# Patient Record
Sex: MALE | Race: WHITE | Employment: OTHER | URBAN - METROPOLITAN AREA
[De-identification: names, ages, dates, MRNs, and addresses within clinical notes are randomized per-mention and may not be internally consistent; named-entity substitution may affect disease eponyms.]

---

## 2019-03-05 RX ORDER — INSULIN DEGLUDEC INJECTION 100 U/ML
24 INJECTION, SOLUTION SUBCUTANEOUS DAILY
COMMUNITY
End: 2021-10-13

## 2019-03-05 RX ORDER — HYDROCODONE BITARTRATE AND ACETAMINOPHEN 10; 325 MG/1; MG/1
1 TABLET ORAL
Status: ON HOLD | COMMUNITY
End: 2019-03-27 | Stop reason: SDUPTHER

## 2019-03-05 RX ORDER — GLIMEPIRIDE 4 MG/1
4 TABLET ORAL DAILY
COMMUNITY

## 2019-03-05 RX ORDER — SOTALOL HYDROCHLORIDE 80 MG/1
40 TABLET ORAL 2 TIMES DAILY
COMMUNITY

## 2019-03-05 RX ORDER — PANTOPRAZOLE SODIUM 40 MG/1
40 TABLET, DELAYED RELEASE ORAL DAILY
COMMUNITY
End: 2021-06-02

## 2019-03-05 RX ORDER — RANITIDINE 150 MG/1
150 TABLET, FILM COATED ORAL 2 TIMES DAILY
COMMUNITY
End: 2019-03-19 | Stop reason: CLARIF

## 2019-03-05 RX ORDER — CLOPIDOGREL BISULFATE 75 MG/1
TABLET ORAL
Status: ON HOLD | COMMUNITY
End: 2019-05-15

## 2019-03-05 RX ORDER — CHLORTHALIDONE 25 MG/1
TABLET ORAL DAILY
COMMUNITY
End: 2019-03-19 | Stop reason: CLARIF

## 2019-03-05 RX ORDER — TRAZODONE HYDROCHLORIDE 50 MG/1
75 TABLET ORAL
COMMUNITY

## 2019-03-05 RX ORDER — LOSARTAN POTASSIUM 100 MG/1
100 TABLET ORAL DAILY
COMMUNITY
End: 2021-10-13

## 2019-03-08 ENCOUNTER — HOSPITAL ENCOUNTER (OUTPATIENT)
Dept: CT IMAGING | Age: 74
Discharge: HOME | End: 2019-03-08
Attending: PHYSICIAN ASSISTANT
Payer: MEDICARE

## 2019-03-08 ENCOUNTER — HOSPITAL ENCOUNTER (OUTPATIENT)
Dept: INTERVENTIONAL RADIOLOGY/VASCULAR | Age: 74
Discharge: HOME | End: 2019-03-08
Attending: PHYSICIAN ASSISTANT
Payer: MEDICARE

## 2019-03-08 DIAGNOSIS — M54.17 RADICULOPATHY OF LUMBOSACRAL REGION: ICD-10-CM

## 2019-03-08 PROCEDURE — 77030014143 HC TY PUNC LUMBR BD -A

## 2019-03-08 RX ORDER — LIDOCAINE HYDROCHLORIDE 20 MG/ML
20-300 INJECTION, SOLUTION EPIDURAL; INFILTRATION; INTRACAUDAL; PERINEURAL ONCE
Status: DISCONTINUED | OUTPATIENT
Start: 2019-03-08 | End: 2019-03-08

## 2019-03-14 ENCOUNTER — HOSPITAL ENCOUNTER (OUTPATIENT)
Dept: CT IMAGING | Age: 74
Discharge: HOME | End: 2019-03-14
Attending: PHYSICIAN ASSISTANT
Payer: MEDICARE

## 2019-03-14 ENCOUNTER — HOSPITAL ENCOUNTER (OUTPATIENT)
Dept: INTERVENTIONAL RADIOLOGY/VASCULAR | Age: 74
Discharge: HOME | End: 2019-03-14
Attending: PHYSICIAN ASSISTANT
Payer: MEDICARE

## 2019-03-14 VITALS
HEART RATE: 70 BPM | WEIGHT: 250 LBS | BODY MASS INDEX: 31.08 KG/M2 | OXYGEN SATURATION: 98 % | SYSTOLIC BLOOD PRESSURE: 175 MMHG | HEIGHT: 75 IN | DIASTOLIC BLOOD PRESSURE: 81 MMHG

## 2019-03-14 PROCEDURE — 77030003666 HC NDL SPINAL BD -A

## 2019-03-14 PROCEDURE — 74011636320 HC RX REV CODE- 636/320: Performed by: PHYSICIAN ASSISTANT

## 2019-03-14 PROCEDURE — 72132 CT LUMBAR SPINE W/DYE: CPT

## 2019-03-14 PROCEDURE — 62304 MYELOGRAPHY LUMBAR INJECTION: CPT

## 2019-03-14 PROCEDURE — 74011250636 HC RX REV CODE- 250/636: Performed by: PHYSICIAN ASSISTANT

## 2019-03-14 PROCEDURE — 77030014143 HC TY PUNC LUMBR BD -A

## 2019-03-14 RX ORDER — LIDOCAINE HYDROCHLORIDE 20 MG/ML
20-400 INJECTION, SOLUTION INFILTRATION; PERINEURAL ONCE
Status: COMPLETED | OUTPATIENT
Start: 2019-03-14 | End: 2019-03-14

## 2019-03-14 RX ADMIN — IOPAMIDOL 16 ML: 408 INJECTION, SOLUTION INTRATHECAL at 14:15

## 2019-03-14 RX ADMIN — LIDOCAINE HYDROCHLORIDE 80 MG: 20 INJECTION, SOLUTION INFILTRATION; PERINEURAL at 14:11

## 2019-03-14 NOTE — PROGRESS NOTES
TRANSFER - OUT REPORT:    Verbal report given to HERNESTO Fang(name) on Norman Morel  being transferred to IR(unit) for routine progression of care       Report consisted of patients Situation, Background, Assessment and   Recommendations(SBAR). Information from the following report(s) SBAR and MAR was reviewed with the receiving nurse. Lines:       Opportunity for questions and clarification was provided.       Patient transported with:   Registered Nurse

## 2019-03-14 NOTE — DISCHARGE INSTRUCTIONS
[address]  Department of Interventional Radiology  [facility]  [phone] Office  [phone] Fax  POST LUMBAR PUNCTURE/MYELOGRAM DISCHARGE INSTRUCTIONS  General Information:  Lumbar Puncture: A LP is done to help diagnose several disorders, like pseudo tumor, migraines, meningitis, and multiple sclerosis. It involves a puncture (usually in the lower spine) into the sac that protects the spinal column. A sample of the fluid in that space is removed and tested in the lab. Myelogram:   A Myelogram involves a lumbar puncture, and instead of removing fluid, contrast will be injected into the sac surrounding the spinal column. It is done to visualize the spinal column, nerve roots, spinal canal, vertebral discs and disc space. It is usually done to diagnose back pain with unknown cause or in preparation for surgery. After the injection, a CT scan will be done, usually within two hours of the injection. Call If:   You should call your Physician and/or the Radiology Nurse if you develop a headache that is not relieved by Tylenol, and worsens when you stand and eases when you lie down, you need to call. You may have developed what is referred to as a spinal headache. Our physicians will probably advise you to be on strict bed rest for 24 hours, to drink lots of fluids and caffeine. If this does not help the head pain, call again the next day. You should call if you have bleeding other than a small spot on your bandage. You should call if you have any numbness, tingling, weakness, fever, chills, urinary retention, severe itching, rash, welts, swelling, or confusion. Follow-up Instructions: See the doctor who ordered your procedure as he/she has instructed. If you had a Lumbar Puncture or Myelogram, your results should be available to your ordering doctor in 3-5 business days.  You can remove your dressing in 24 hours and shower regularly. Do not bathe or swim for 72 hours. To Reach Us: If you have any questions about your procedure, please call the Interventional Radiology department at [phone]. After business hours (5pm) and weekends, call the answering service at [phone] and ask for the Radiologist on call to be paged. Interventional Radiology General Nurse Discharge    After general anesthesia or intravenous sedation, for 24 hours or while taking prescription Narcotics:  · Limit your activities  · Do not drive and operate hazardous machinery  · Do not make important personal or business decisions  · Do  not drink alcoholic beverages  · If you have not urinated within 8 hours after discharge, please contact your surgeon on call. * Please give a list of your current medications to your Primary Care Provider. * Please update this list whenever your medications are discontinued, doses are     changed, or new medications (including over-the-counter products) are added. * Please carry medication information at all times in case of emergency situations. These are general instructions for a healthy lifestyle:    No smoking/ No tobacco products/ Avoid exposure to second hand smoke  Surgeon General's Warning:  Quitting smoking now greatly reduces serious risk to your health. Obesity, smoking, and sedentary lifestyle greatly increases your risk for illness  A healthy diet, regular physical exercise & weight monitoring are important for maintaining a healthy lifestyle    You may be retaining fluid if you have a history of heart failure or if you experience any of the following symptoms:  Weight gain of 3 pounds or more overnight or 5 pounds in a week, increased swelling in our hands or feet or shortness of breath while lying flat in bed. Please call your doctor as soon as you notice any of these symptoms; do not wait until your next office visit.     Recognize signs and symptoms of STROKE:  F-face looks uneven    A-arms unable to move or move unevenly    S-speech slurred or non-existent    T-time-call 911 as soon as signs and symptoms begin-DO NOT go       Back to bed or wait to see if you get better-TIME IS BRAIN.       Patient Signature:  Date: 3/14/2019  Discharging Nurse: Malu Bear RN

## 2019-03-14 NOTE — PROGRESS NOTES
Discharged per provider order. Verbalized understanding of discharge instructions. Transferred out via wheelchair with wife to drive home.

## 2019-03-14 NOTE — PROGRESS NOTES
Patient in procedure room at [phone], patient alert and orient, patient assisted to procedure table with minimal help. Patient will not have sedation for this case.

## 2019-03-19 ENCOUNTER — HOSPITAL ENCOUNTER (OUTPATIENT)
Dept: SURGERY | Age: 74
Discharge: HOME | End: 2019-03-19
Payer: MEDICARE

## 2019-03-19 VITALS
BODY MASS INDEX: 31.69 KG/M2 | RESPIRATION RATE: 18 BRPM | DIASTOLIC BLOOD PRESSURE: 76 MMHG | SYSTOLIC BLOOD PRESSURE: 168 MMHG | HEIGHT: 76 IN | WEIGHT: 260.25 LBS | OXYGEN SATURATION: 98 % | HEART RATE: 69 BPM | TEMPERATURE: 98.1 F

## 2019-03-19 LAB
BACTERIA SPEC CULT: ABNORMAL
EST. AVERAGE GLUCOSE BLD GHB EST-MCNC: 157 MG/DL
GLUCOSE BLD STRIP.AUTO-MCNC: 157 MG/DL (ref 65–100)
HBA1C MFR BLD: 7.1 % (ref 4.8–6)
POTASSIUM SERPL-SCNC: 4.4 MMOL/L (ref 3.5–5.1)
SERVICE CMNT-IMP: ABNORMAL

## 2019-03-19 PROCEDURE — 83036 HEMOGLOBIN GLYCOSYLATED A1C: CPT

## 2019-03-19 PROCEDURE — 87641 MR-STAPH DNA AMP PROBE: CPT

## 2019-03-19 PROCEDURE — 84132 ASSAY OF SERUM POTASSIUM: CPT

## 2019-03-19 PROCEDURE — 77030027138 HC INCENT SPIROMETER -A

## 2019-03-19 PROCEDURE — 82962 GLUCOSE BLOOD TEST: CPT

## 2019-03-19 NOTE — PERIOP NOTES
Lab results reviewed and acceptable per anesthesia protocol. Still awaiting MRSA results. Recent Results (from the past 12 hour(s)) POTASSIUM Collection Time: 03/19/19  2:52 PM  
Result Value Ref Range Potassium 4.4 3.5 - 5.1 mmol/L  
HEMOGLOBIN A1C WITH EAG Collection Time: 03/19/19  2:52 PM  
Result Value Ref Range Hemoglobin A1c 7.1 (H) 4.8 - 6.0 % Est. average glucose 157 mg/dL GLUCOSE, POC Collection Time: 03/19/19  2:59 PM  
Result Value Ref Range Glucose (POC) 157 (H) 65 - 100 mg/dL

## 2019-03-19 NOTE — PERIOP NOTES
Called Dr. Dedra Cabrera office for most recent pacemaker interrogation record, most recent echo, and cardiac clearance for surgery and to hold Xarelto for 3 days and Plavix for 7 days. Spoke with Shamika Oquendo in medical records, will fax documents. [facility] at Dr. Lucio Perez office notified of needing clearance to hold blood thinners.

## 2019-03-19 NOTE — PERIOP NOTES
Patient verified name & . Order to obtain consent NOT found in EHR  And unable to match case posting. TYPE  CASE:lb Orders: NOT received at time of PAT Labs per Spine protocol:  MRSA, HA1C Labs per anesthesia protocol: Potassium, Poc Glucose EKG/cardiac records  :  2018 A-Paced, Has [employer] pacemaker that was placed 2016, Requested most recent interrogation, ECHO and cardiac clearance from Dr. Reyes Baxter. Patient has 2 documented stents in careeverywhere but patient states he has total of 10 stents. Last heart cath on 2018 which he received 1 stent to LAD. Glucose: 157 Medication bottles or medication list were not visualized today. Instructed patient to continue previous medications as prescribed prior to surgery and  to take the following medications the day of surgery according to anesthesia guidelines with a small sip of water : Sotalol, Protonix, Hydrocodone if needed, Tresiba 19 units on DOS Continue all previous medications unless otherwise directed. Instructed patient to hold all vitamins 7 days prior to surgery and the following medications prior to surgery: Plavix starting 3/20/2019 and Xarelto starting 3/24/2019. Patient was instructed to start taking ASA 81 mg on the day Xarelto is stopped. Pt viewed Spine Pre-hab video. All further questions were addressed. Pt was provided with antibacterial soap, Hibiclens, long-handled sponge, Spine Recovery booklet and incentive spirometer. Pt correctly demonstrated use of incentive spirometer and instruction to bring it to the hospital on day of surgery. Handouts and all Surgery instructions provided to pt and pt verbalizes understanding. Patient Guide to Surgery Packet provided to patient.  Packet includes Patient Guide to surgery handout, Facts about Pain Management handout, Facts about Urinary Catherization handout, Hand Hygiene handout, Patient Education and Teaching Sheet -Transfusion of Blood and Blood Products handout, and  [facility] frequent question and answer sheet. Guide reviewed with the patient and all questions answered to the satisfaction of the patient. Pt advised to visit www. [employer] for more educational information regarding anesthesia and to record any additional questions that arise so that it can be addressed by the anesthesiologist on the morning of surgery. Patient instructed on the following and verbalized understanding: 
Arrive at main entrance, time of arrival to be called the day before by 1700. Responsible adult must drive patient to and from hospital, stay during surgery and 24 hours postoperatively. Npo after midnight including gum, mints and ice chips. Shower using hibiclens the night before and the morning of surgery. Hibiclens provided to the patient with handout and verbal instructions for use. Leave all valuables at home. Instructed on no jewelry or body piercings on the dos. Bring insurance card and ID. No perfumes, oil, powder, colognes, makeup or  lotions on the skin. Patient verbalized understanding of all instructions and provided all medical/health information to the best of their ability.

## 2019-03-19 NOTE — PERIOP NOTES
Dr. Landry Landry requesting pacer rep on DOS. Called [facility] and spoke with [place]. Will have the rep Matthew Coppola return call to confirm. Matthew Coppola returned called and confirmed that he would be here on day of surgery.

## 2019-03-20 RX ORDER — CEFAZOLIN SODIUM/WATER 2 G/20 ML
2 SYRINGE (ML) INTRAVENOUS ONCE
Status: CANCELLED | OUTPATIENT
Start: 2019-03-20 | End: 2019-03-20

## 2019-03-21 NOTE — PERIOP NOTES
Spoke with Zhang Arango at Dr. Viji Gerene office and requested records and medication hold note. (see nurse note 3/19/19 at [phone]).

## 2019-03-21 NOTE — PERIOP NOTES
Received faxed records from [facility] and placed on chart. Records include last 3 office visit notes with most recent of 3/19/19, Medication hold note for Xarelto and Plavix and cardiac clearance with acceptable risk dated 3/19/19, last pacemaker interrogation 12/20/18, heart cath note 5/19/18, ekg 2/22/19 and 6/27/18, echo 5/29/18, abdominal aorta report 5/29/18, lower extremity arterial report 5/29/18, carotid doppler report 5/29/18, stress test 5/29/18.

## 2019-03-22 NOTE — H&P
Chief complaint: Back and leg pain. History of present illness: The patient returns today with persistent symptoms of low back pain with radiation to bilateral groin and medial thigh. It greatly limits his ability to stand or walk. His symptoms are not completely alleviated by laying supine. His best position is sitting and leaning forward. He did try a couple of injections in his symptoms were alleviated for about 2 weeks. However, his sugars elevated significantly and he was unable to tolerate repeated injections. He has also tried a regimen of gabapentin and pain medication. He has tried a home exercise program but is unable to tolerate any time at all on his feet. His pain is rated 8/10 on the visual analog scale. He is here follow-up on his CT myelogram and discuss surgical options. PMHx/PSHx/Social History/Medications/Allergies/ROS are documented separately and have been reviewed. ROS: Denies fever, chills, chest pain. ????? Medications: Chlorthalidone (25 MG); Clopidogrel Bisulfate (75 MG);Glimepiride (4 MG); Hydrocodone-Acetaminophen ( MG); Losartan Potassium (100 MG);Pantoprazole Sodium (40 MG); Sotalol HCl (80 MG);TraZODone HCl (150 MG); Vinetta Lilliana FlexTouch (200 UNI. .. UNIT/ML); Xarelto (20 MG)  ????? Allergies: Augmentin;Sulfa; Tyloxapol  ?????    Physical Exam: This is a well developed well nourished adult male in no acute distress. Mood and affect are appropriate. Oriented to person, place, and time. Head is normocephalic and atraumatic. Extraocular movements intact. Sclera anicteric. Respirations are unlabored and there is no evidence of cyanosis. Chest is clear to auscultation. Heart is regular rate and rhythm. Abdomen is soft. Straight leg testing is negative. There is subjective light touch sensory loss over the bilateral anterior and medial thighs.     Reflexes   Right Left   Quadriceps (L4) 2 2   Achilles (S1) 2 2     Ankle jerk is negative for clonus    Strength testing in the lower extremity reveals the following based on the 5 point grading scale:     HF (L2) H Ab (L5) KE (L3/4) ADF (L4) EHL (L5) A Ev (S1) APF (S1)   Right 3 5 4 5 5 5 5   Left 3 5 4 5 5 5 5     The feet are warm with good capillary refill and palpable pedal pulses. Radiographic Studies:    CT scan lumbar spine report and images reviewed: He has severe stenosis at L2-L3 with only about 2 mm of canal remaining. He has postoperative changes at L5-S1. Assessment/Plan: This patients clinical history and physical exam is consistent with neurogenic claudication. The imaging studies are concordant with the patients symptoms. Conservative efforts have been reasonably exhausted and the patient feels like he cannot go on with the symptoms as they are. We have previously discussed surgical options and now he would like to proceed with surgical scheduling.  ????? We discussed the details of the surgery including a midline incision in over the low back followed by dissection to the area of stenosis. The nerves would be freed up by trimming any impinging structures including ligaments and bone. Once the nerves are freed the wound would be closed with suture and covered with sterile dressings. The patient would expect to stay in the hospital 1-2 days or until he can get about safely with minimal assistance. A short stay in a rehabilitation facility could also be considered depending on how quickly he recovers. Follow-up would be scheduled for 2-3 weeks and he would have restrictions including no driving, and no lifting greater than 15 lbs until follow up with me.   We also discussed the potential risks of the surgery including, but not limited to infection, spinal fluid leak and potential headaches requiring him to remain supine or have a lumbar drain inserted post-operatively; injury to the cauda equina or peripheral nerve root resulting in paralysis, bowel or bladder dysfunction, or loss of use of an extremity; persistent back or leg symptoms, recurrence of stenosis or the development of instability possibly needing additional surgery;  blood loss requiring transfusion; and the risks of anesthesia including, but not limited heart attack, stroke, and blood clot. The patient voiced an understanding of these issues and would like to discuss them over with his family and will get back with me with her desired treatment course. The surgery that I believe would be most beneficial here is a L2-L3 laminectomy. Dillan table with sling. He has already had cardiac clearance. He will come off of the Plavix and Xarelto at the appropriate times and remain off of them for 2 days postoperatively.           Electronically Signed By Rachel De Leon MD

## 2019-03-26 ENCOUNTER — ANESTHESIA EVENT (OUTPATIENT)
Dept: SURGERY | Age: 74
End: 2019-03-26
Payer: MEDICARE

## 2019-03-27 ENCOUNTER — ANESTHESIA (OUTPATIENT)
Dept: SURGERY | Age: 74
End: 2019-03-27
Payer: MEDICARE

## 2019-03-27 ENCOUNTER — HOSPITAL ENCOUNTER (OUTPATIENT)
Age: 74
Setting detail: OUTPATIENT SURGERY
Discharge: HOME | End: 2019-03-27
Attending: ORTHOPAEDIC SURGERY | Admitting: ORTHOPAEDIC SURGERY
Payer: MEDICARE

## 2019-03-27 ENCOUNTER — APPOINTMENT (OUTPATIENT)
Dept: GENERAL RADIOLOGY | Age: 74
End: 2019-03-27
Attending: ORTHOPAEDIC SURGERY
Payer: MEDICARE

## 2019-03-27 VITALS
DIASTOLIC BLOOD PRESSURE: 67 MMHG | BODY MASS INDEX: 30.88 KG/M2 | OXYGEN SATURATION: 95 % | HEIGHT: 76 IN | SYSTOLIC BLOOD PRESSURE: 149 MMHG | RESPIRATION RATE: 17 BRPM | HEART RATE: 70 BPM | TEMPERATURE: 97.7 F | WEIGHT: 253.56 LBS

## 2019-03-27 DIAGNOSIS — M48.062 LUMBAR STENOSIS WITH NEUROGENIC CLAUDICATION: Primary | ICD-10-CM

## 2019-03-27 LAB — GLUCOSE BLD STRIP.AUTO-MCNC: 123 MG/DL (ref 65–100)

## 2019-03-27 PROCEDURE — 76210000020 HC REC RM PH II FIRST 0.5 HR: Performed by: ORTHOPAEDIC SURGERY

## 2019-03-27 PROCEDURE — 76210000006 HC OR PH I REC 0.5 TO 1 HR: Performed by: ORTHOPAEDIC SURGERY

## 2019-03-27 PROCEDURE — 77030037088 HC TUBE ENDOTRACH ORAL NSL COVD-A: Performed by: NURSE ANESTHETIST, CERTIFIED REGISTERED

## 2019-03-27 PROCEDURE — 74011250636 HC RX REV CODE- 250/636

## 2019-03-27 PROCEDURE — 77030019940 HC BLNKT HYPOTHRM STRY -B: Performed by: NURSE ANESTHETIST, CERTIFIED REGISTERED

## 2019-03-27 PROCEDURE — 76010000161 HC OR TIME 1 TO 1.5 HR INTENSV-TIER 1: Performed by: ORTHOPAEDIC SURGERY

## 2019-03-27 PROCEDURE — 74011250637 HC RX REV CODE- 250/637: Performed by: ORTHOPAEDIC SURGERY

## 2019-03-27 PROCEDURE — 74011000250 HC RX REV CODE- 250: Performed by: ORTHOPAEDIC SURGERY

## 2019-03-27 PROCEDURE — 74011250637 HC RX REV CODE- 250/637: Performed by: ANESTHESIOLOGY

## 2019-03-27 PROCEDURE — 77030030163 HC BN WAX J&J -A: Performed by: ORTHOPAEDIC SURGERY

## 2019-03-27 PROCEDURE — 74011250636 HC RX REV CODE- 250/636: Performed by: ORTHOPAEDIC SURGERY

## 2019-03-27 PROCEDURE — 77030018836 HC SOL IRR NACL ICUM -A: Performed by: ORTHOPAEDIC SURGERY

## 2019-03-27 PROCEDURE — 77030031139 HC SUT VCRL2 J&J -A: Performed by: ORTHOPAEDIC SURGERY

## 2019-03-27 PROCEDURE — 77030019908 HC STETH ESOPH SIMS -A: Performed by: NURSE ANESTHETIST, CERTIFIED REGISTERED

## 2019-03-27 PROCEDURE — 74011250636 HC RX REV CODE- 250/636: Performed by: ANESTHESIOLOGY

## 2019-03-27 PROCEDURE — 82962 GLUCOSE BLOOD TEST: CPT

## 2019-03-27 PROCEDURE — 76060000033 HC ANESTHESIA 1 TO 1.5 HR: Performed by: ORTHOPAEDIC SURGERY

## 2019-03-27 PROCEDURE — 77030014650 HC SEAL MTRX FLOSEL BAXT -C: Performed by: ORTHOPAEDIC SURGERY

## 2019-03-27 PROCEDURE — 77030032490 HC SLV COMPR SCD KNE COVD -B: Performed by: ORTHOPAEDIC SURGERY

## 2019-03-27 PROCEDURE — 72020 X-RAY EXAM OF SPINE 1 VIEW: CPT

## 2019-03-27 PROCEDURE — 74011000250 HC RX REV CODE- 250

## 2019-03-27 PROCEDURE — 77030039425 HC BLD LARYNG TRULITE DISP TELE -A: Performed by: NURSE ANESTHETIST, CERTIFIED REGISTERED

## 2019-03-27 PROCEDURE — 77030020782 HC GWN BAIR PAWS FLX 3M -B: Performed by: NURSE ANESTHETIST, CERTIFIED REGISTERED

## 2019-03-27 PROCEDURE — 77030025623 HC BUR RND PRECIS STRY -D: Performed by: ORTHOPAEDIC SURGERY

## 2019-03-27 RX ORDER — LIDOCAINE HYDROCHLORIDE 10 MG/ML
0.1 INJECTION INFILTRATION; PERINEURAL AS NEEDED
Status: DISCONTINUED | OUTPATIENT
Start: 2019-03-27 | End: 2019-03-27 | Stop reason: HOSPADM

## 2019-03-27 RX ORDER — NALOXONE HYDROCHLORIDE 0.4 MG/ML
0.1 INJECTION, SOLUTION INTRAMUSCULAR; INTRAVENOUS; SUBCUTANEOUS
Status: DISCONTINUED | OUTPATIENT
Start: 2019-03-27 | End: 2019-03-27 | Stop reason: HOSPADM

## 2019-03-27 RX ORDER — ACETAMINOPHEN 500 MG
1000 TABLET ORAL ONCE
Status: COMPLETED | OUTPATIENT
Start: 2019-03-27 | End: 2019-03-27

## 2019-03-27 RX ORDER — FENTANYL CITRATE 50 UG/ML
INJECTION, SOLUTION INTRAMUSCULAR; INTRAVENOUS AS NEEDED
Status: DISCONTINUED | OUTPATIENT
Start: 2019-03-27 | End: 2019-03-27 | Stop reason: HOSPADM

## 2019-03-27 RX ORDER — SODIUM CHLORIDE, SODIUM LACTATE, POTASSIUM CHLORIDE, CALCIUM CHLORIDE 600; 310; 30; 20 MG/100ML; MG/100ML; MG/100ML; MG/100ML
75 INJECTION, SOLUTION INTRAVENOUS CONTINUOUS
Status: DISCONTINUED | OUTPATIENT
Start: 2019-03-27 | End: 2019-03-27 | Stop reason: HOSPADM

## 2019-03-27 RX ORDER — SODIUM CHLORIDE, SODIUM LACTATE, POTASSIUM CHLORIDE, CALCIUM CHLORIDE 600; 310; 30; 20 MG/100ML; MG/100ML; MG/100ML; MG/100ML
INJECTION, SOLUTION INTRAVENOUS
Status: DISCONTINUED | OUTPATIENT
Start: 2019-03-27 | End: 2019-03-27 | Stop reason: HOSPADM

## 2019-03-27 RX ORDER — VANCOMYCIN HYDROCHLORIDE 1 G/20ML
INJECTION, POWDER, LYOPHILIZED, FOR SOLUTION INTRAVENOUS AS NEEDED
Status: DISCONTINUED | OUTPATIENT
Start: 2019-03-27 | End: 2019-03-27 | Stop reason: HOSPADM

## 2019-03-27 RX ORDER — BUPIVACAINE HYDROCHLORIDE AND EPINEPHRINE 5; 5 MG/ML; UG/ML
INJECTION, SOLUTION EPIDURAL; INTRACAUDAL; PERINEURAL AS NEEDED
Status: DISCONTINUED | OUTPATIENT
Start: 2019-03-27 | End: 2019-03-27 | Stop reason: HOSPADM

## 2019-03-27 RX ORDER — HYDROCODONE BITARTRATE AND ACETAMINOPHEN 10; 325 MG/1; MG/1
1 TABLET ORAL
Qty: 42 TAB | Refills: 0 | Status: SHIPPED | OUTPATIENT
Start: 2019-03-27 | End: 2019-04-03

## 2019-03-27 RX ORDER — DEXAMETHASONE SODIUM PHOSPHATE 4 MG/ML
INJECTION, SOLUTION INTRA-ARTICULAR; INTRALESIONAL; INTRAMUSCULAR; INTRAVENOUS; SOFT TISSUE AS NEEDED
Status: DISCONTINUED | OUTPATIENT
Start: 2019-03-27 | End: 2019-03-27 | Stop reason: HOSPADM

## 2019-03-27 RX ORDER — ROCURONIUM BROMIDE 10 MG/ML
INJECTION, SOLUTION INTRAVENOUS AS NEEDED
Status: DISCONTINUED | OUTPATIENT
Start: 2019-03-27 | End: 2019-03-27 | Stop reason: HOSPADM

## 2019-03-27 RX ORDER — LIDOCAINE HYDROCHLORIDE 20 MG/ML
INJECTION, SOLUTION EPIDURAL; INFILTRATION; INTRACAUDAL; PERINEURAL AS NEEDED
Status: DISCONTINUED | OUTPATIENT
Start: 2019-03-27 | End: 2019-03-27 | Stop reason: HOSPADM

## 2019-03-27 RX ORDER — NEOSTIGMINE METHYLSULFATE 1 MG/ML
INJECTION INTRAVENOUS AS NEEDED
Status: DISCONTINUED | OUTPATIENT
Start: 2019-03-27 | End: 2019-03-27 | Stop reason: HOSPADM

## 2019-03-27 RX ORDER — OXYCODONE HYDROCHLORIDE 5 MG/1
10 TABLET ORAL
Status: DISCONTINUED | OUTPATIENT
Start: 2019-03-27 | End: 2019-03-27 | Stop reason: HOSPADM

## 2019-03-27 RX ORDER — CEFAZOLIN SODIUM/WATER 2 G/20 ML
2 SYRINGE (ML) INTRAVENOUS ONCE
Status: COMPLETED | OUTPATIENT
Start: 2019-03-27 | End: 2019-03-27

## 2019-03-27 RX ORDER — GLYCOPYRROLATE 0.2 MG/ML
INJECTION INTRAMUSCULAR; INTRAVENOUS AS NEEDED
Status: DISCONTINUED | OUTPATIENT
Start: 2019-03-27 | End: 2019-03-27 | Stop reason: HOSPADM

## 2019-03-27 RX ORDER — HYDROMORPHONE HYDROCHLORIDE 2 MG/ML
0.5 INJECTION, SOLUTION INTRAMUSCULAR; INTRAVENOUS; SUBCUTANEOUS
Status: DISCONTINUED | OUTPATIENT
Start: 2019-03-27 | End: 2019-03-27 | Stop reason: HOSPADM

## 2019-03-27 RX ORDER — GABAPENTIN 300 MG/1
300 CAPSULE ORAL ONCE
Status: COMPLETED | OUTPATIENT
Start: 2019-03-27 | End: 2019-03-27

## 2019-03-27 RX ORDER — ASPIRIN 81 MG/1
81 TABLET ORAL DAILY
COMMUNITY
Start: 2019-03-24 | End: 2021-06-02

## 2019-03-27 RX ORDER — FLUMAZENIL 0.1 MG/ML
0.2 INJECTION INTRAVENOUS AS NEEDED
Status: DISCONTINUED | OUTPATIENT
Start: 2019-03-27 | End: 2019-03-27 | Stop reason: HOSPADM

## 2019-03-27 RX ORDER — PROPOFOL 10 MG/ML
INJECTION, EMULSION INTRAVENOUS AS NEEDED
Status: DISCONTINUED | OUTPATIENT
Start: 2019-03-27 | End: 2019-03-27 | Stop reason: HOSPADM

## 2019-03-27 RX ORDER — EPHEDRINE SULFATE 50 MG/ML
INJECTION, SOLUTION INTRAVENOUS AS NEEDED
Status: DISCONTINUED | OUTPATIENT
Start: 2019-03-27 | End: 2019-03-27 | Stop reason: HOSPADM

## 2019-03-27 RX ORDER — DIPHENHYDRAMINE HYDROCHLORIDE 50 MG/ML
12.5 INJECTION, SOLUTION INTRAMUSCULAR; INTRAVENOUS
Status: DISCONTINUED | OUTPATIENT
Start: 2019-03-27 | End: 2019-03-27 | Stop reason: HOSPADM

## 2019-03-27 RX ORDER — OXYCODONE HYDROCHLORIDE 5 MG/1
5 TABLET ORAL
Status: DISCONTINUED | OUTPATIENT
Start: 2019-03-27 | End: 2019-03-27 | Stop reason: HOSPADM

## 2019-03-27 RX ADMIN — NEOSTIGMINE METHYLSULFATE 1.5 MG: 1 INJECTION INTRAVENOUS at 10:00

## 2019-03-27 RX ADMIN — SODIUM CHLORIDE, SODIUM LACTATE, POTASSIUM CHLORIDE, AND CALCIUM CHLORIDE 75 ML/HR: 600; 310; 30; 20 INJECTION, SOLUTION INTRAVENOUS at 08:13

## 2019-03-27 RX ADMIN — DEXAMETHASONE SODIUM PHOSPHATE 4 MG: 4 INJECTION, SOLUTION INTRA-ARTICULAR; INTRALESIONAL; INTRAMUSCULAR; INTRAVENOUS; SOFT TISSUE at 09:15

## 2019-03-27 RX ADMIN — FENTANYL CITRATE 25 MCG: 50 INJECTION, SOLUTION INTRAMUSCULAR; INTRAVENOUS at 10:02

## 2019-03-27 RX ADMIN — ROCURONIUM BROMIDE 50 MG: 10 INJECTION, SOLUTION INTRAVENOUS at 09:07

## 2019-03-27 RX ADMIN — FENTANYL CITRATE 25 MCG: 50 INJECTION, SOLUTION INTRAMUSCULAR; INTRAVENOUS at 10:05

## 2019-03-27 RX ADMIN — FENTANYL CITRATE 25 MCG: 50 INJECTION, SOLUTION INTRAMUSCULAR; INTRAVENOUS at 10:00

## 2019-03-27 RX ADMIN — PROPOFOL 10 MG: 10 INJECTION, EMULSION INTRAVENOUS at 09:51

## 2019-03-27 RX ADMIN — Medication 1 G: at 09:15

## 2019-03-27 RX ADMIN — Medication 1 G: at 09:17

## 2019-03-27 RX ADMIN — SODIUM CHLORIDE, SODIUM LACTATE, POTASSIUM CHLORIDE, CALCIUM CHLORIDE: 600; 310; 30; 20 INJECTION, SOLUTION INTRAVENOUS at 08:58

## 2019-03-27 RX ADMIN — FENTANYL CITRATE 25 MCG: 50 INJECTION, SOLUTION INTRAMUSCULAR; INTRAVENOUS at 10:08

## 2019-03-27 RX ADMIN — Medication 3 AMPULE: at 08:12

## 2019-03-27 RX ADMIN — LIDOCAINE HYDROCHLORIDE 100 MG: 20 INJECTION, SOLUTION EPIDURAL; INFILTRATION; INTRACAUDAL; PERINEURAL at 09:07

## 2019-03-27 RX ADMIN — GLYCOPYRROLATE 0.2 MG: 0.2 INJECTION INTRAMUSCULAR; INTRAVENOUS at 10:00

## 2019-03-27 RX ADMIN — ACETAMINOPHEN 1000 MG: 500 TABLET, FILM COATED ORAL at 08:12

## 2019-03-27 RX ADMIN — GLYCOPYRROLATE 0.2 MG: 0.2 INJECTION INTRAMUSCULAR; INTRAVENOUS at 10:01

## 2019-03-27 RX ADMIN — PROPOFOL 150 MG: 10 INJECTION, EMULSION INTRAVENOUS at 09:07

## 2019-03-27 RX ADMIN — FENTANYL CITRATE 50 MCG: 50 INJECTION, SOLUTION INTRAMUSCULAR; INTRAVENOUS at 08:59

## 2019-03-27 RX ADMIN — EPHEDRINE SULFATE 5 MG: 50 INJECTION, SOLUTION INTRAVENOUS at 09:46

## 2019-03-27 RX ADMIN — GABAPENTIN 300 MG: 300 CAPSULE ORAL at 08:12

## 2019-03-27 RX ADMIN — FENTANYL CITRATE 50 MCG: 50 INJECTION, SOLUTION INTRAMUSCULAR; INTRAVENOUS at 09:29

## 2019-03-27 RX ADMIN — NEOSTIGMINE METHYLSULFATE 1.5 MG: 1 INJECTION INTRAVENOUS at 10:01

## 2019-03-27 NOTE — OP NOTES
[address] [address]   [phone]    OPERATIVE REPORT    Patient [address] [facility]  [id number]  1945  68 y.o. DATE OF SURGERY: 3/27/2019    SURGEON: Dr. Kathy Good. PREOPERATIVE DIAGNOSIS: Lumbar stenosis    POSTOPERATIVE DIAGNOSIS: Lumbar stenosis    PROCEDURE:    1. Lumbar laminectomy  L1-L3  with bilateral foraminotomies. ANESTHESIA: General.    ESTIMATED BLOOD LOSS: 100 ml    POSTOPERATIVE CONDITION: Stable. INTRAOPERATIVE COMPLICATIONS: None. INDICATIONS FOR PROCEDURE: Back and leg pain consistent with claudication/lumbar radiculitis that is no longer responsive to conservative measures. Walking and standing tolerances have diminished. Imaging studies are concordant, showing lumbar stenosis. In the outpatient setting, the risks, benefits, and potential complications of the above listed procedure were discussed and an informed consent was obtained. DESCRIPTION OF PROCEDURE: After adequate induction of general anesthesia, the patient was positioned prone on the [facility] spinal table. Care was taken to pad all bony prominences. The shoulders and elbows were placed in the 90/90 position. The abdomen was allowed to hang free to decrease intraabdominal and venous pressure. The lumbar area was prepped and draped in the usual sterile fashion. Preoperative antibiotic was administered. A time out was called to confirm the appropriate patient, proposed procedure and proposed incision sites. With this conformation, an incision was created midline, over the lumbar spinous processes. Dissection was carried down to the lumbodorsal fascia. A Kocher clamp was attached to a spinous process and a cross-table lateral fluoroscopic image was obtained to confirm appropriate spinal level.  With this confirmation, the spinous process was marked with a Leksell rongeur and the lumbodorsal fascia and paraspinous musculature were elevated in a subperiosteal fashion, laterally off of the spinous processes and lamina. The pars interarticularis was exposed at each level. Care was taken to preserve the facet capsule at each level. The deep retractors were placed to facilitate visualization. A Leksell rongeur was used to resect the spinous processes of  L2 and L3. The 4 mm ema was used to thin the lamina to an eggshell like thickness. A triple-0 angled curet was used to elevated the ligamentum flavum off of its origin on the caudal surface of the L3 lamina as well as off the cephalad surface of the adjacent lamina. The ligamentum flavum was elevated from the thecal sac and a plane was created in the epidural space with the [facility]. A 4 mm Kerrison was used to perform a central laminectomy of  L3 - L1 . The central laminectomy was widened to the medial border of the pedicle at each level. With the central laminectomy completed, a 4 mm Kerrison was used to under cut the lateral recess along the entire length of the laminectomy site. Then using the [facility] elevator to retract the thecal sac and identify each of the nerve roots, partial foraminotomies were performed and each nerve was visualized and decompressed bilaterally. There was felt to be no significant facet instability and a fusion was not deemed to be necessary. With the decompression completed, the wound was liberally irrigated with saline solution. Floseal and vancomycin were applied. The lumbodorsal fascia was approximated with a #1 Vicryl suture in an interrupted fashion. The subcutaneous tissue and skin were approximated in a layered fashion. Benzoin and Steri-Strips were applied. Sterile dressings were applied. The patient tolerated the procedure well and was returned to the postanesthesia care unit in stable condition. At the end of the case, all sponge, needle, and instrument counts were correct.        Camryn Pruitt MD

## 2019-03-27 NOTE — ANESTHESIA POSTPROCEDURE EVALUATION
Procedure(s): 
L2 L3 LAMINECTOMY . general 
 
Anesthesia Post Evaluation Multimodal analgesia: multimodal analgesia used between 6 hours prior to anesthesia start to PACU discharge Patient location during evaluation: PACU Patient participation: complete - patient participated Level of consciousness: awake Pain management: adequate Airway patency: patent Anesthetic complications: no 
Cardiovascular status: acceptable and hemodynamically stable Respiratory status: acceptable Hydration status: acceptable Comments: Acceptable for discharge from PACU. Post anesthesia nausea and vomiting:  none Vitals Value Taken Time /76 3/27/2019 10:53 AM  
Temp 36.5 °C (97.7 °F) 3/27/2019 10:48 AM  
Pulse 69 3/27/2019 10:53 AM  
Resp 16 3/27/2019 10:48 AM  
SpO2 96 % 3/27/2019 10:53 AM  
Vitals shown include unvalidated device data.

## 2019-03-27 NOTE — ANESTHESIA PREPROCEDURE EVALUATION
Relevant Problems No relevant active problems Anesthetic History No history of anesthetic complications Review of Systems / Medical History Patient summary reviewed and pertinent labs reviewed Pulmonary COPD (Asbestosis - mild): mild Sleep apnea: CPAP Neuro/Psych Within defined limits Cardiovascular Hypertension: well controlled Dysrhythmias : atrial fibrillation CAD and cardiac stents (Most recent 6/18) Exercise tolerance: >4 METS Comments: Plavix held 5d and Xarelto held 2d with Cardiologist approval. ASA 81 started while Plavix held. GI/Hepatic/Renal 
  
GERD Endo/Other Diabetes: well controlled, type 2, using insulin Obesity and arthritis Other Findings Physical Exam 
 
Airway Mallampati: II 
TM Distance: > 6 cm Neck ROM: normal range of motion Mouth opening: Normal 
 
 Cardiovascular Rhythm: regular Rate: normal 
 
 
 
 Dental 
No notable dental hx Pulmonary Breath sounds clear to auscultation Abdominal 
 
 
 
 Other Findings Anesthetic Plan ASA: 3 Anesthesia type: general 
 
 
 
 
 
Anesthetic plan and risks discussed with: Patient

## 2019-03-27 NOTE — DISCHARGE INSTRUCTIONS
Please hold xarelto/plavix/Aspirin for 2 more days after discharge. Discharge Instructions    Wound Care and Showering  Your wound will typically be covered with a clear plastic dressing when you go home from the hospital. Since it is transparent, you will see the underlying gauze turn red with blood which is normal. You do not need to change the dressing unless it is leaking from the edges. Otherwise leave this dressing in place. The clear plastic dressing is waterproof so you can take a shower while it is on. You may remove the clear plastic dressing and the underlying gauze 3 days after surgery. There will be small tape strips under the gauze which should be left in place. If there is no leaking from the wound, you may take a shower and allow the tape strips to get wet. Some of them may fall off. The remaining strips will be removed once you return to the office. If there is persistent leaking when you first remove the clear dressing, apply new gauze and new clear plastic dressing (typically purchased at a pharmacy) over the wound. Hair washing is permissible in the shower. No tub baths, hot tubs or whirlpools until seen in the office. If any of the following should occur, please call the office:    -Persistent drainage from the incision site.  -Opening of incisions  -Fevers greater than 101 degrees  -Flu-like symptoms  -Increased redness    Exercise  You have unlimited walking and stair climbing privileges. Walking outside or walking on a treadmill without an incline is also allowed. Do NOT lift anything weighing greater than 10-15 lbs. Especially try to avoid lifting or reaching above your head. Sleeping  You may sleep in any comfortable position. Many patients find comfort sleeping in a recliner chair. It is normal to have difficulty sleeping for the first several weeks following your surgery. We recommend trying Benadryl, Melatonin, or Tylenol PM for help sleeping.  All are over-the-counter and can be found in drugstores. Eating  Because of the tubes in your throat while asleep during surgery, it is normal to have a sore throat and some difficulty swallowing solid foods after your surgery. This may persist for several weeks. Eating soft foods like yogurt, macaroni, and mashed potatoes seem to help. Pain  If you feel you need pain medicine, you may take regular or extra-strength Tylenol. Do NOT take an anti-inflammatory medication such as Advil, Aleve, or Motrin for the first 8 weeks following your surgery. Anti-inflammatory medications like these hinder bone growth and healing, which is critical in the weeks following surgery. Do NOT resume taking Foasamax for 8 weeks after your fusion surgery. To help alleviate persistent soreness around the shoulder lades, apply ice or warm moist compresses. Driving  You may NOT drive a car until told otherwise by your physician. You may be a passenger for short distances (about 20-30 minutes). If you must take a longer trip, be sure to make several pit stops so that you can walk and stretch your legs. Reclining in the passenger seat seems to be the most comfortable position for most patients. In some states, it is illegal to drive a car while wearing a neck brace. Follow up appointments  When you are discharged from the hospital, a follow up appointment will be made for 2-3 weeks from your surgery date. Call [phone] to confirm your appointment. These are general instructions for a healthy lifestyle:    No smoking/ No tobacco products/ Avoid exposure to second hand smoke    Surgeon General's Warning:  Quitting smoking now greatly reduces serious risk to your health.     Obesity, smoking, and sedentary lifestyle greatly increases your risk for illness    A healthy diet, regular physical exercise & weight monitoring are important for maintaining a healthy lifestyle    You may be retaining fluid if you have a history of heart failure or if you experience any of the following symptoms:  Weight gain of 3 pounds or more overnight or 5 pounds in a week, increased swelling in our hands or feet or shortness of breath while lying flat in bed. Please call your doctor as soon as you notice any of these symptoms; do not wait until your next office visit. Recognize signs and symptoms of STROKE:    F-face looks uneven    A-arms unable to move or move unevenly    S-speech slurred or non-existent    T-time-call 911 as soon as signs and symptoms begin-DO NOT go       Back to bed or wait to see if you get better-TIME IS BRAIN. No driving for 24 hours and until Dr. Cassandra Monreal clears you to drive. Do not make important personal or business decisions for 24 hours. No alcoholic beverages for 24 hours.

## 2019-05-13 ENCOUNTER — HOSPITAL ENCOUNTER (OUTPATIENT)
Dept: SURGERY | Age: 74
Discharge: HOME | End: 2019-05-13

## 2019-05-13 VITALS — BODY MASS INDEX: 30.44 KG/M2 | WEIGHT: 250 LBS | HEIGHT: 76 IN

## 2019-05-13 NOTE — PERIOP NOTES
Patient verified name and . Order for consent not found in EHR - unable to verify procedure at this time. Type 1b surgery, Phone assessment complete. Orders not received. Labs per surgeon: unknown  Labs per anesthesia protocol: none    Most recent card notes on chart including (pacer interrogation 3/27/19, 18), ekg's (19, 18), stress (18), carotid (18), AAA report - normal (18), echo (18), office notes (19, 18), cath report (18) - all within anesthesia protocols for surgery. Pt states he called card directly for Plavix/Xarelto hold instructions and states he was instructed to stop his Plavix from now on and ok to hold Xarelto 48 hours prior to surgery. Self requested med hold documentation from [facility] ([phone]). [employer] rep - [phone] - notified of surgery to have rep present. Kelli Salter given direct # to preop and will call 19 for surgery time. Celi Allen in [place] scheduling notified of pacemaker to add to case posting. Patient answered medical/surgical history questions at their best of ability. All prior to admission medications documented in [facility]. Patient instructed to take the following medications the day of surgery according to anesthesia guidelines with a small sip of water: ASA, Tresiba (19 units), Protonix, Sotalol. Hold all vitamins 7 days prior to surgery and NSAIDS 5 days prior to surgery.  Prescription meds to hold: Xarelto 48 hours (per pt)    Patient instructed on the following:  Arrive at A Entrance, time of arrival to be called the day before by 1700  NPO after midnight including gum, mints, and ice chips  Responsible adult must drive patient to the hospital, stay during surgery, and patient will need supervision 24 hours after anesthesia  Use non-moisturizing in shower the night before surgery and on the morning of surgery  All piercings must be removed prior to arrival.    Leave all valuables (money and jewelry) at home but bring insurance card and ID on       DOS. Do not wear make-up, nail polish, lotions, cologne, perfumes, powders, or oil on skin. Patient teach back successful and patient demonstrates knowledge of instruction.

## 2019-05-13 NOTE — PERIOP NOTES
Received call back from pt. Asking for clarification as to what meds to hold before surgery. Informed note in EMR states to hold xarelto 48 hrs, hold vitamins/supplements 7 days prior and hold NSAIDs 5 days prior. Pt verbalized understanding of information and teach back successful.

## 2019-05-14 ENCOUNTER — ANESTHESIA EVENT (OUTPATIENT)
Dept: SURGERY | Age: 74
End: 2019-05-14
Payer: MEDICARE

## 2019-05-14 NOTE — PERIOP NOTES
Left message with Janet Leonard rep [phone] requesting him to be here at 11:00 5/15/19 per anesthesia request for pacemaker management.

## 2019-05-14 NOTE — PERIOP NOTES
Xarelto clearance note received from [facility] stating may may stop Xarelto for 48 hours prior to surgery.

## 2019-05-15 ENCOUNTER — HOSPITAL ENCOUNTER (OUTPATIENT)
Age: 74
Setting detail: OUTPATIENT SURGERY
Discharge: HOME | End: 2019-05-15
Attending: ORTHOPAEDIC SURGERY | Admitting: ORTHOPAEDIC SURGERY
Payer: MEDICARE

## 2019-05-15 ENCOUNTER — ANESTHESIA (OUTPATIENT)
Dept: SURGERY | Age: 74
End: 2019-05-15
Payer: MEDICARE

## 2019-05-15 VITALS
TEMPERATURE: 97.7 F | OXYGEN SATURATION: 96 % | SYSTOLIC BLOOD PRESSURE: 173 MMHG | RESPIRATION RATE: 16 BRPM | BODY MASS INDEX: 30.37 KG/M2 | HEART RATE: 73 BPM | HEIGHT: 76 IN | WEIGHT: 249.4 LBS | DIASTOLIC BLOOD PRESSURE: 84 MMHG

## 2019-05-15 LAB — GLUCOSE BLD STRIP.AUTO-MCNC: 113 MG/DL (ref 65–100)

## 2019-05-15 PROCEDURE — 77030018836 HC SOL IRR NACL ICUM -A: Performed by: ORTHOPAEDIC SURGERY

## 2019-05-15 PROCEDURE — 74011250636 HC RX REV CODE- 250/636

## 2019-05-15 PROCEDURE — 77030031139 HC SUT VCRL2 J&J -A: Performed by: ORTHOPAEDIC SURGERY

## 2019-05-15 PROCEDURE — A4565 SLINGS: HCPCS | Performed by: ORTHOPAEDIC SURGERY

## 2019-05-15 PROCEDURE — 82962 GLUCOSE BLOOD TEST: CPT

## 2019-05-15 PROCEDURE — 76010010054 HC POST OP PAIN BLOCK

## 2019-05-15 PROCEDURE — 77030020268 HC MISC GENERAL SUPPLY: Performed by: ORTHOPAEDIC SURGERY

## 2019-05-15 PROCEDURE — 77030003602 HC NDL NRV BLK BBMI -B: Performed by: ANESTHESIOLOGY

## 2019-05-15 PROCEDURE — 93005 ELECTROCARDIOGRAM TRACING: CPT

## 2019-05-15 PROCEDURE — 74011000250 HC RX REV CODE- 250

## 2019-05-15 PROCEDURE — 77030002933 HC SUT MCRYL J&J -A: Performed by: ORTHOPAEDIC SURGERY

## 2019-05-15 PROCEDURE — 77030039266 HC ADH SKN EXOFIN S2SG -A: Performed by: ORTHOPAEDIC SURGERY

## 2019-05-15 PROCEDURE — 77030039425 HC BLD LARYNG TRULITE DISP TELE -A: Performed by: ANESTHESIOLOGY

## 2019-05-15 PROCEDURE — C1713 ANCHOR/SCREW BN/BN,TIS/BN: HCPCS | Performed by: ORTHOPAEDIC SURGERY

## 2019-05-15 PROCEDURE — 77030018673: Performed by: ORTHOPAEDIC SURGERY

## 2019-05-15 PROCEDURE — 76210000006 HC OR PH I REC 0.5 TO 1 HR: Performed by: ORTHOPAEDIC SURGERY

## 2019-05-15 PROCEDURE — 77030004434 HC BUR RND STRY -B: Performed by: ORTHOPAEDIC SURGERY

## 2019-05-15 PROCEDURE — 74011250636 HC RX REV CODE- 250/636: Performed by: ANESTHESIOLOGY

## 2019-05-15 PROCEDURE — 77030019908 HC STETH ESOPH SIMS -A: Performed by: ANESTHESIOLOGY

## 2019-05-15 PROCEDURE — 76060000035 HC ANESTHESIA 2 TO 2.5 HR: Performed by: ORTHOPAEDIC SURGERY

## 2019-05-15 PROCEDURE — 77030002913 HC SUT ETHBND J&J -B: Performed by: ORTHOPAEDIC SURGERY

## 2019-05-15 PROCEDURE — 76210000020 HC REC RM PH II FIRST 0.5 HR: Performed by: ORTHOPAEDIC SURGERY

## 2019-05-15 PROCEDURE — 77030016570 HC BLNKT BAIR HGGR 3M -B: Performed by: ANESTHESIOLOGY

## 2019-05-15 PROCEDURE — 76010000171 HC OR TIME 2 TO 2.5 HR INTENSV-TIER 1: Performed by: ORTHOPAEDIC SURGERY

## 2019-05-15 PROCEDURE — 77030037088 HC TUBE ENDOTRACH ORAL NSL COVD-A: Performed by: ANESTHESIOLOGY

## 2019-05-15 PROCEDURE — 74011000250 HC RX REV CODE- 250: Performed by: ORTHOPAEDIC SURGERY

## 2019-05-15 PROCEDURE — 77030006788 HC BLD SAW OSC STRY -B: Performed by: ORTHOPAEDIC SURGERY

## 2019-05-15 DEVICE — TWINFIX ULTRA 5.5 MM PK SUTURE                                    ANCHOR WITH TWO NO.2 ULTRABRAID                                    SUTURES BLUE, BLUE-COBRAID WITH NEEDLES
Type: IMPLANTABLE DEVICE | Site: SHOULDER | Status: FUNCTIONAL
Brand: TWINFIX

## 2019-05-15 RX ORDER — FENTANYL CITRATE 50 UG/ML
100 INJECTION, SOLUTION INTRAMUSCULAR; INTRAVENOUS ONCE
Status: COMPLETED | OUTPATIENT
Start: 2019-05-15 | End: 2019-05-15

## 2019-05-15 RX ORDER — SODIUM CHLORIDE, SODIUM LACTATE, POTASSIUM CHLORIDE, CALCIUM CHLORIDE 600; 310; 30; 20 MG/100ML; MG/100ML; MG/100ML; MG/100ML
100 INJECTION, SOLUTION INTRAVENOUS CONTINUOUS
Status: DISCONTINUED | OUTPATIENT
Start: 2019-05-15 | End: 2019-05-15 | Stop reason: HOSPADM

## 2019-05-15 RX ORDER — OXYCODONE HYDROCHLORIDE 5 MG/1
5 TABLET ORAL
Status: DISCONTINUED | OUTPATIENT
Start: 2019-05-15 | End: 2019-05-15 | Stop reason: HOSPADM

## 2019-05-15 RX ORDER — HYDROMORPHONE HYDROCHLORIDE 2 MG/ML
0.5 INJECTION, SOLUTION INTRAMUSCULAR; INTRAVENOUS; SUBCUTANEOUS
Status: DISCONTINUED | OUTPATIENT
Start: 2019-05-15 | End: 2019-05-15 | Stop reason: HOSPADM

## 2019-05-15 RX ORDER — DEXAMETHASONE SODIUM PHOSPHATE 4 MG/ML
INJECTION, SOLUTION INTRA-ARTICULAR; INTRALESIONAL; INTRAMUSCULAR; INTRAVENOUS; SOFT TISSUE
Status: COMPLETED | OUTPATIENT
Start: 2019-05-15 | End: 2019-05-15

## 2019-05-15 RX ORDER — ROCURONIUM BROMIDE 10 MG/ML
INJECTION, SOLUTION INTRAVENOUS AS NEEDED
Status: DISCONTINUED | OUTPATIENT
Start: 2019-05-15 | End: 2019-05-15 | Stop reason: HOSPADM

## 2019-05-15 RX ORDER — BUPIVACAINE HYDROCHLORIDE AND EPINEPHRINE 5; 5 MG/ML; UG/ML
INJECTION, SOLUTION EPIDURAL; INTRACAUDAL; PERINEURAL AS NEEDED
Status: DISCONTINUED | OUTPATIENT
Start: 2019-05-15 | End: 2019-05-15 | Stop reason: HOSPADM

## 2019-05-15 RX ORDER — LIDOCAINE HYDROCHLORIDE 10 MG/ML
0.1 INJECTION INFILTRATION; PERINEURAL AS NEEDED
Status: DISCONTINUED | OUTPATIENT
Start: 2019-05-15 | End: 2019-05-15 | Stop reason: HOSPADM

## 2019-05-15 RX ORDER — PROPOFOL 10 MG/ML
INJECTION, EMULSION INTRAVENOUS AS NEEDED
Status: DISCONTINUED | OUTPATIENT
Start: 2019-05-15 | End: 2019-05-15 | Stop reason: HOSPADM

## 2019-05-15 RX ORDER — NEOSTIGMINE METHYLSULFATE 1 MG/ML
INJECTION INTRAVENOUS AS NEEDED
Status: DISCONTINUED | OUTPATIENT
Start: 2019-05-15 | End: 2019-05-15 | Stop reason: HOSPADM

## 2019-05-15 RX ORDER — GLYCOPYRROLATE 0.2 MG/ML
INJECTION INTRAMUSCULAR; INTRAVENOUS AS NEEDED
Status: DISCONTINUED | OUTPATIENT
Start: 2019-05-15 | End: 2019-05-15 | Stop reason: HOSPADM

## 2019-05-15 RX ORDER — MIDAZOLAM HYDROCHLORIDE 1 MG/ML
2 INJECTION, SOLUTION INTRAMUSCULAR; INTRAVENOUS ONCE
Status: COMPLETED | OUTPATIENT
Start: 2019-05-15 | End: 2019-05-15

## 2019-05-15 RX ORDER — MIDAZOLAM HYDROCHLORIDE 1 MG/ML
2 INJECTION, SOLUTION INTRAMUSCULAR; INTRAVENOUS
Status: DISCONTINUED | OUTPATIENT
Start: 2019-05-15 | End: 2019-05-15 | Stop reason: HOSPADM

## 2019-05-15 RX ORDER — NALOXONE HYDROCHLORIDE 0.4 MG/ML
0.04 INJECTION, SOLUTION INTRAMUSCULAR; INTRAVENOUS; SUBCUTANEOUS
Status: DISCONTINUED | OUTPATIENT
Start: 2019-05-15 | End: 2019-05-15 | Stop reason: HOSPADM

## 2019-05-15 RX ORDER — LIDOCAINE HYDROCHLORIDE 20 MG/ML
INJECTION, SOLUTION EPIDURAL; INFILTRATION; INTRACAUDAL; PERINEURAL AS NEEDED
Status: DISCONTINUED | OUTPATIENT
Start: 2019-05-15 | End: 2019-05-15 | Stop reason: HOSPADM

## 2019-05-15 RX ADMIN — SODIUM CHLORIDE, SODIUM LACTATE, POTASSIUM CHLORIDE, AND CALCIUM CHLORIDE 100 ML/HR: 600; 310; 30; 20 INJECTION, SOLUTION INTRAVENOUS at 10:03

## 2019-05-15 RX ADMIN — DEXAMETHASONE SODIUM PHOSPHATE 4 MG: 4 INJECTION, SOLUTION INTRA-ARTICULAR; INTRALESIONAL; INTRAMUSCULAR; INTRAVENOUS; SOFT TISSUE at 13:57

## 2019-05-15 RX ADMIN — NEOSTIGMINE METHYLSULFATE 3 MG: 1 INJECTION INTRAVENOUS at 17:29

## 2019-05-15 RX ADMIN — MIDAZOLAM 2 MG: 1 INJECTION INTRAMUSCULAR; INTRAVENOUS at 13:42

## 2019-05-15 RX ADMIN — ROCURONIUM BROMIDE 10 MG: 10 INJECTION, SOLUTION INTRAVENOUS at 16:25

## 2019-05-15 RX ADMIN — PROPOFOL 160 MG: 10 INJECTION, EMULSION INTRAVENOUS at 15:30

## 2019-05-15 RX ADMIN — LIDOCAINE HYDROCHLORIDE 100 MG: 20 INJECTION, SOLUTION EPIDURAL; INFILTRATION; INTRACAUDAL; PERINEURAL at 15:30

## 2019-05-15 RX ADMIN — FENTANYL CITRATE 100 MCG: 50 INJECTION INTRAMUSCULAR; INTRAVENOUS at 13:42

## 2019-05-15 RX ADMIN — ROCURONIUM BROMIDE 40 MG: 10 INJECTION, SOLUTION INTRAVENOUS at 15:30

## 2019-05-15 RX ADMIN — GLYCOPYRROLATE 0.4 MG: 0.2 INJECTION INTRAMUSCULAR; INTRAVENOUS at 17:29

## 2019-05-15 NOTE — ANESTHESIA POSTPROCEDURE EVALUATION
Procedure(s): LEFT SHOULDER ACROMIOPLASTY ROTATOR CUFF REPAIR (OPEN) BICEPS TENOTOMY 
OPEN LEFT  DISTAL CLAVICLE EXCISION GEN/SCAL/ RIGHT SHOULDER INJECTION. general, regional 
 
Anesthesia Post Evaluation Multimodal analgesia: multimodal analgesia used between 6 hours prior to anesthesia start to PACU discharge Patient location during evaluation: bedside Patient participation: complete - patient participated Level of consciousness: awake and alert Pain management: adequate Airway patency: patent Anesthetic complications: no 
Cardiovascular status: hemodynamically stable Respiratory status: spontaneous ventilation Hydration status: euvolemic Comments: Patient stable and may discharge at this time. Good result with interscalene nerve block. Vitals Value Taken Time /80 5/15/2019  6:09 PM  
Temp 36.5 °C (97.7 °F) 5/15/2019  5:39 PM  
Pulse 69 5/15/2019  6:09 PM  
Resp 16 5/15/2019  6:09 PM  
SpO2 94 % 5/15/2019  6:09 PM

## 2019-05-15 NOTE — ANESTHESIA PREPROCEDURE EVALUATION
Relevant Problems   No relevant active problems       Anesthetic History   No history of anesthetic complications            Review of Systems / Medical History  Patient summary reviewed and pertinent labs reviewed    Pulmonary    COPD (Asbestosis - mild): mild    Sleep apnea: CPAP           Neuro/Psych             Comments: neuropathy Cardiovascular    Hypertension: well controlled        Dysrhythmias (s/p ablation 2017) : atrial fibrillation  Pacemaker (pacer for bradycardia), CAD, PAD and cardiac stents (Most recent 6/18)    Exercise tolerance: <4 METS: Had back surgery recently       GI/Hepatic/Renal     GERD: well controlled           Endo/Other    Diabetes: well controlled, type 2, using insulin    Obesity, arthritis and cancer (prostate)     Other Findings            Physical Exam    Airway  Mallampati: II  TM Distance: > 6 cm  Neck ROM: normal range of motion   Mouth opening: Normal     Cardiovascular    Rhythm: regular  Rate: normal         Dental  No notable dental hx       Pulmonary  Breath sounds clear to auscultation               Abdominal         Other Findings            Anesthetic Plan    ASA: 3  Anesthesia type: general      Post-op pain plan if not by surgeon: peripheral nerve block single    Induction: Intravenous  Anesthetic plan and risks discussed with: Patient and Family

## 2019-05-15 NOTE — ANESTHESIA PROCEDURE NOTES
Peripheral Block Start time: 5/15/2019 1:42 PM 
End time: 5/15/2019 1:57 PM 
Performed by: Argelia Aguilar MD 
Authorized by: Argelia Aguilar MD  
 
 
Pre-procedure: Indications: at surgeon's request and post-op pain management Preanesthetic Checklist: patient identified, risks and benefits discussed, site marked, timeout performed, anesthesia consent given and patient being monitored Timeout Time: 13:42 Block Type:  
Block Type: Interscalene Laterality:  Left Monitoring:  Standard ASA monitoring, responsive to questions, oxygen, continuous pulse ox, frequent vital sign checks and heart rate Injection Technique:  Single shot Procedures: ultrasound guided and nerve stimulator Patient Position: seated (lateral decubitus) Prep: chlorhexidine Location:  Interscalene Needle Type:  Stimuplex Needle Gauge:  22 G Needle Localization:  Nerve stimulator and ultrasound guidance Motor Response: minimal motor response >0.4 mA Assessment: 
Number of attempts:  1 Injection Assessment:  Incremental injection every 5 mL, local visualized surrounding nerve on ultrasound, negative aspiration for blood, no intravascular symptoms, no paresthesia and ultrasound image on chart Patient tolerance:  Patient tolerated the procedure well with no immediate complications All needles out intact, proc. Tolerated well.

## 2019-05-15 NOTE — DISCHARGE INSTRUCTIONS
Rotator Cuff Repair Discharge Instructions    ACTIVITY:   - Limit activity today: increase activity tomorrow, but no vigorous activity. As tolerated and as directed by your doctor  - Use arm sling or swathe for 6 weeks  - Bath or shower is OK  - Ice pack to area as needed for up to 5 days      DIET:  - Clear liquids until no nausea or vomiting  - Advance to regular diet as tolerated  - If nausea and vomiting occurs,use the phenergan prescription you were given by Dr Zee Wooten. If the phenergan doesn't work please call our doctor on call. (Call [phone] if it  is after regular office hours)        PAIN:  - Expect a moderate amount of pain  - Take pain medication(s) as directed  - Call your doctor if pain is NOT relieved by medication (if it is after normal office hours call [phone])  - DO NOT take aspirin or blood thinners until directed by your doctor    DRESSING CARE:  The surgical glue on your wound may be treated like normal skin. It is OK to wash the wound. It is fine to get it wet. CALL YOUR DOCTOR IF:  - Excessive bleeding that does not stop after holding mild pressure over the area for 15 minutes  - Any color, temperature, or sensation changes in the operative arm/hand  - Temperature of 101°F or greater  - Green or yellow, smelly discharge from incision  - Nausea or vomiting that does not stop by bedtime    AFTER ANESTHESIA:  - For the next 24 hours: DO NOT drive, drink alcoholic beverages, or make important decisions  - Be aware of dizziness following anesthesia and while taking pain medication(s)  - Plan to stay tonight within 1 hour's drive of the hospital    MEDICATIONS:  Continue home medications as previously prescribed with the following changes: none.       Signed:  Freddy Venegas MD  5/15/2019  5:32 PM

## 2019-05-15 NOTE — BRIEF OP NOTE
BRIEF OPERATIVE NOTE Date of Procedure: 5/15/2019 Preoperative Diagnosis: Complete tear of left rotator cuff, unspecified whether traumatic [M75.122] Strain of muscle, fascia and tendon of long head of biceps, left arm, initial encounter [Z32.165J] Arthritis of left acromioclavicular joint [M19.012] Postoperative Diagnosis: Complete tear of left rotator cuff, unspecified whether traumatic [M75.122] Strain of muscle, fascia and tendon of long head of biceps, left arm, initial encounter [J58.501K] Arthritis of left acromioclavicular joint [M19.012] Procedure(s): LEFT SHOULDER ACROMIOPLASTY ROTATOR CUFF REPAIR (OPEN) POSS BICEPS TENOTOMY 
OPEN LEFT  DISTAL CLAVICLE EXCISION GEN/SCAL CARRERO/NEPHEW SUTURE ANCHORS TWIN FIX PK 4.5MM, 5.5MM  PT HAS PACEMAKER Surgeon(s) and Role: Juliano Cervantes MD - Primary Surgical Assistant:  
 
Surgical Staff: 
Circ-1: Elijah Simon RN; Disha García RN 
Circ-Relief: Chanelle Elizabeth RN Scrub Tech-1: Mayda Barker Scrub Tech-2: Barrie Martínez Event Time In Time Out Incision Start [id number] Incision Close 1725 Anesthesia: General  
Estimated Blood Loss: <50cc Specimens: * No specimens in log * Findings: large cuff tear and biceps tendon tear Complications: none Implants:  
Implant Name Type Inv. Item Serial No.  Lot No. LRB No. Used Action ANCHOR SUT TWNFX 5.5 ULTR W/2 -- Mitzy Albright - [id number]  ANCHOR SUT TWNFX 5.5 ULTR W/2 -- Mitzy Albright [id number] [employer] [id number] Left 3 Implanted

## 2021-06-07 ENCOUNTER — HOSPITAL ENCOUNTER (OUTPATIENT)
Dept: INTERVENTIONAL RADIOLOGY/VASCULAR | Age: 76
Discharge: HOME | End: 2021-06-07
Attending: PHYSICIAN ASSISTANT
Payer: MEDICARE

## 2021-06-07 ENCOUNTER — HOSPITAL ENCOUNTER (OUTPATIENT)
Dept: CT IMAGING | Age: 76
Discharge: HOME | End: 2021-06-07
Attending: PHYSICIAN ASSISTANT
Payer: MEDICARE

## 2021-06-07 DIAGNOSIS — M54.16 LUMBAR RADICULOPATHY: ICD-10-CM

## 2021-06-07 DIAGNOSIS — M51.36 DDD (DEGENERATIVE DISC DISEASE), LUMBAR: ICD-10-CM

## 2021-06-07 DIAGNOSIS — M47.816 SPONDYLOSIS OF LUMBAR SPINE: ICD-10-CM

## 2021-06-07 DIAGNOSIS — M48.062 LUMBAR STENOSIS WITH NEUROGENIC CLAUDICATION: ICD-10-CM

## 2021-06-07 PROCEDURE — 77030003666 HC NDL SPINAL BD -A

## 2021-06-07 PROCEDURE — 62304 MYELOGRAPHY LUMBAR INJECTION: CPT

## 2021-06-07 PROCEDURE — 74011000636 HC RX REV CODE- 636: Performed by: PHYSICIAN ASSISTANT

## 2021-06-07 PROCEDURE — 74011000250 HC RX REV CODE- 250: Performed by: PHYSICIAN ASSISTANT

## 2021-06-07 PROCEDURE — 72132 CT LUMBAR SPINE W/DYE: CPT

## 2021-06-07 PROCEDURE — 77030014143 HC TY PUNC LUMBR BD -A

## 2021-06-07 RX ORDER — LIDOCAINE HYDROCHLORIDE 20 MG/ML
40-120 INJECTION, SOLUTION INFILTRATION; PERINEURAL ONCE
Status: COMPLETED | OUTPATIENT
Start: 2021-06-07 | End: 2021-06-07

## 2021-06-07 RX ADMIN — IOPAMIDOL 15 ML: 408 INJECTION, SOLUTION INTRATHECAL at 08:14

## 2021-06-07 RX ADMIN — LIDOCAINE HYDROCHLORIDE 80 MG: 20 INJECTION, SOLUTION INFILTRATION; PERINEURAL at 08:08

## 2021-06-07 NOTE — PROGRESS NOTES
Recovery period without difficulty. Pt alert and oriented and denies pain. Dressing is clean, dry, and intact. Reviewed discharge instructions with patient and spouse, both verbalized understanding. Pt escorted to lobby discharge area via wheelchair. Vital signs and Helen score completed.

## 2021-06-07 NOTE — DISCHARGE INSTRUCTIONS
[address]  Department of Interventional Radiology  [address] [place] [address] [address] [facility]  [phone] Office  [phone] Fax  POST LUMBAR PUNCTURE/MYELOGRAM/INTRATHECAL CHEMOTHERAPY DISCHARGE INSTRUCTIONS  General Information:  Lumbar Puncture: A LP is done to help diagnose several disorders, like pseudo tumor, migraines, meningitis, and multiple sclerosis. It involves a puncture (usually in the lower spine) into the sac that protects the spinal column. A sample of the fluid in that space is removed and tested in the lab. Myelogram:   A Myelogram involves a lumbar puncture, and instead of removing fluid, contrast will be injected into the sac surrounding the spinal column. It is done to visualize the spinal column, nerve roots, spinal canal, vertebral discs and disc space. It is usually done to diagnose back pain with unknown cause or in preparation for surgery. After the injection, a CT scan will be done, usually within two hours of the injection. Intrathecal Chemotherapy:   Chemotherapy can be given in many forms. Intrathecal chemo involves a lumbar puncture, and instead of removing fluid, the chemo will be injected into the space. After any of these procedures, you will be asked to lie flat on your back for 4-6 hours to prevent complications. You should also rest for 24 hours after you go home, and force fluids. If you have a headache, you should take Tylenol or acetaminophen. Call If:   You should call your Physician and/or the Radiology Nurse if you develop a headache that is not relieved by Tylenol, and worsens when you stand and eases when you lie down, you need to call. You may have developed what is referred to as a spinal headache. Our physicians will probably advise you to be on strict bed rest for 24 hours, to drink lots of fluids and caffeine. If this does not help the head pain, call again the next day.  You should call if you have bleeding other than a small spot on your bandage. You should call if you have any numbness, tingling, weakness, fever, chills, urinary retention, severe itching, rash, welts, swelling, or confusion. Follow-up Instructions: See the doctor who ordered your procedure as he/she has instructed. If you had a Lumbar Puncture or Myelogram, your results should be available to your ordering doctor in 3-5 business days. You can remove your dressing in 24 hours and shower regularly. Do not bathe or swim for 72 hours. To Reach Us: If you have any questions about your procedure, please call the Interventional Radiology department at [phone]. After business hours (5pm) and weekends, call the answering service at [phone] and ask for the Radiologist on call to be paged. Si tiene Preguntas acerca del procedimiento, por favor llame al departamento de Radiología Intervencional al [phone]. Después de horas de oficina (5 pm) y los fines de [place], llamar al Lesia Larson al [phone] y pregunte por el Radiologo de [facility]. Interventional Radiology General Nurse Discharge    After general anesthesia or intravenous sedation, for 24 hours or while taking prescription Narcotics:  · Limit your activities  · Do not drive and operate hazardous machinery  · Do not make important personal or business decisions  · Do  not drink alcoholic beverages  · If you have not urinated within 8 hours after discharge, please contact your surgeon on call. * Please give a list of your current medications to your Primary Care Provider. * Please update this list whenever your medications are discontinued, doses are     changed, or new medications (including over-the-counter products) are added. * Please carry medication information at all times in case of emergency situations.     These are general instructions for a healthy lifestyle:    No smoking/ No tobacco products/ Avoid exposure to second hand smoke  Surgeon Alessandro Dhillon Warning:  Quitting smoking now greatly reduces serious risk to your health. Obesity, smoking, and sedentary lifestyle greatly increases your risk for illness  A healthy diet, regular physical exercise & weight monitoring are important for maintaining a healthy lifestyle    You may be retaining fluid if you have a history of heart failure or if you experience any of the following symptoms:  Weight gain of 3 pounds or more overnight or 5 pounds in a week, increased swelling in our hands or feet or shortness of breath while lying flat in bed. Please call your doctor as soon as you notice any of these symptoms; do not wait until your next office visit. Recognize signs and symptoms of STROKE:  F-face looks uneven    A-arms unable to move or move unevenly    S-speech slurred or non-existent    T-time-call 911 as soon as signs and symptoms begin-DO NOT go       Back to bed or wait to see if you get better-TIME IS BRAIN.     Patient Signature:  Date: 6/7/2021  Discharging Nurse: Johana Dhaliwal RN

## 2021-10-05 ENCOUNTER — HOSPITAL ENCOUNTER (INPATIENT)
Age: 76
LOS: 8 days | Discharge: HOME | DRG: 617 | End: 2021-10-13
Attending: EMERGENCY MEDICINE | Admitting: FAMILY MEDICINE
Payer: MEDICARE

## 2021-10-05 ENCOUNTER — APPOINTMENT (OUTPATIENT)
Dept: GENERAL RADIOLOGY | Age: 76
DRG: 617 | End: 2021-10-05
Attending: EMERGENCY MEDICINE
Payer: MEDICARE

## 2021-10-05 DIAGNOSIS — L02.612 ABSCESS OF LEFT FOOT: Primary | ICD-10-CM

## 2021-10-05 DIAGNOSIS — L03.116 CELLULITIS OF LEFT LOWER EXTREMITY: ICD-10-CM

## 2021-10-05 PROBLEM — I10 HTN (HYPERTENSION): Status: ACTIVE | Noted: 2021-10-05

## 2021-10-05 PROBLEM — E11.40 DIABETIC NEUROPATHY ASSOCIATED WITH TYPE 2 DIABETES MELLITUS (HCC): Status: ACTIVE | Noted: 2021-10-05

## 2021-10-05 PROBLEM — J44.9 COPD (CHRONIC OBSTRUCTIVE PULMONARY DISEASE) (HCC): Status: ACTIVE | Noted: 2021-10-05

## 2021-10-05 PROBLEM — E11.9 DM TYPE 2 (DIABETES MELLITUS, TYPE 2) (HCC): Status: ACTIVE | Noted: 2021-10-05

## 2021-10-05 PROBLEM — E78.5 HYPERLIPIDEMIA: Status: ACTIVE | Noted: 2021-10-05

## 2021-10-05 PROBLEM — Z95.0 H/O CARDIAC PACEMAKER: Status: ACTIVE | Noted: 2021-10-05

## 2021-10-05 PROBLEM — G47.30 SLEEP APNEA: Status: ACTIVE | Noted: 2021-10-05

## 2021-10-05 PROBLEM — E66.9 CLASS 1 OBESITY IN ADULT: Status: ACTIVE | Noted: 2021-10-05

## 2021-10-05 PROBLEM — I25.10 CAD (CORONARY ARTERY DISEASE): Status: ACTIVE | Noted: 2021-10-05

## 2021-10-05 PROBLEM — M35.3 POLYMYALGIA RHEUMATICA (HCC): Status: ACTIVE | Noted: 2021-10-05

## 2021-10-05 LAB
ALBUMIN SERPL-MCNC: 2.7 G/DL (ref 3.2–4.6)
ALBUMIN/GLOB SERPL: 0.5 {RATIO} (ref 1.2–3.5)
ALP SERPL-CCNC: 109 U/L (ref 50–136)
ALT SERPL-CCNC: 135 U/L (ref 12–65)
ANION GAP SERPL CALC-SCNC: 7 MMOL/L (ref 7–16)
AST SERPL-CCNC: 96 U/L (ref 15–37)
BASOPHILS # BLD: 0 K/UL (ref 0–0.2)
BASOPHILS NFR BLD: 0 % (ref 0–2)
BILIRUB SERPL-MCNC: 1.2 MG/DL (ref 0.2–1.1)
BUN SERPL-MCNC: 21 MG/DL (ref 8–23)
CALCIUM SERPL-MCNC: 9 MG/DL (ref 8.3–10.4)
CHLORIDE SERPL-SCNC: 99 MMOL/L (ref 98–107)
CO2 SERPL-SCNC: 28 MMOL/L (ref 21–32)
CREAT SERPL-MCNC: 1.38 MG/DL (ref 0.8–1.5)
DIFFERENTIAL METHOD BLD: ABNORMAL
EOSINOPHIL # BLD: 0.3 K/UL (ref 0–0.8)
EOSINOPHIL NFR BLD: 2 % (ref 0.5–7.8)
ERYTHROCYTE [DISTWIDTH] IN BLOOD BY AUTOMATED COUNT: 12.4 % (ref 11.9–14.6)
EST. AVERAGE GLUCOSE BLD GHB EST-MCNC: 180 MG/DL
GLOBULIN SER CALC-MCNC: 5.6 G/DL (ref 2.3–3.5)
GLUCOSE BLD STRIP.AUTO-MCNC: 206 MG/DL (ref 65–100)
GLUCOSE SERPL-MCNC: 118 MG/DL (ref 65–100)
HBA1C MFR BLD: 7.9 % (ref 4.2–6.3)
HCT VFR BLD AUTO: 40.3 % (ref 41.1–50.3)
HGB BLD-MCNC: 13.1 G/DL (ref 13.6–17.2)
IMM GRANULOCYTES # BLD AUTO: 0.1 K/UL (ref 0–0.5)
IMM GRANULOCYTES NFR BLD AUTO: 1 % (ref 0–5)
LACTATE SERPL-SCNC: 1.1 MMOL/L (ref 0.4–2)
LYMPHOCYTES # BLD: 1.8 K/UL (ref 0.5–4.6)
LYMPHOCYTES NFR BLD: 16 % (ref 13–44)
MCH RBC QN AUTO: 30.3 PG (ref 26.1–32.9)
MCHC RBC AUTO-ENTMCNC: 32.5 G/DL (ref 31.4–35)
MCV RBC AUTO: 93.1 FL (ref 79.6–97.8)
MONOCYTES # BLD: 0.9 K/UL (ref 0.1–1.3)
MONOCYTES NFR BLD: 8 % (ref 4–12)
NEUTS SEG # BLD: 8.2 K/UL (ref 1.7–8.2)
NEUTS SEG NFR BLD: 73 % (ref 43–78)
NRBC # BLD: 0 K/UL (ref 0–0.2)
PLATELET # BLD AUTO: 347 K/UL (ref 150–450)
PMV BLD AUTO: 9.3 FL (ref 9.4–12.3)
POTASSIUM SERPL-SCNC: 4.4 MMOL/L (ref 3.5–5.1)
PROT SERPL-MCNC: 8.3 G/DL (ref 6.3–8.2)
RBC # BLD AUTO: 4.33 M/UL (ref 4.23–5.6)
SERVICE CMNT-IMP: ABNORMAL
SODIUM SERPL-SCNC: 134 MMOL/L (ref 138–145)
WBC # BLD AUTO: 11.1 K/UL (ref 4.3–11.1)

## 2021-10-05 PROCEDURE — 94762 N-INVAS EAR/PLS OXIMTRY CONT: CPT

## 2021-10-05 PROCEDURE — 74011250636 HC RX REV CODE- 250/636: Performed by: EMERGENCY MEDICINE

## 2021-10-05 PROCEDURE — 74011250637 HC RX REV CODE- 250/637: Performed by: FAMILY MEDICINE

## 2021-10-05 PROCEDURE — 65660000000 HC RM CCU STEPDOWN

## 2021-10-05 PROCEDURE — 85025 COMPLETE CBC W/AUTO DIFF WBC: CPT

## 2021-10-05 PROCEDURE — 96374 THER/PROPH/DIAG INJ IV PUSH: CPT

## 2021-10-05 PROCEDURE — 74011636637 HC RX REV CODE- 636/637: Performed by: FAMILY MEDICINE

## 2021-10-05 PROCEDURE — 80053 COMPREHEN METABOLIC PANEL: CPT

## 2021-10-05 PROCEDURE — 82962 GLUCOSE BLOOD TEST: CPT

## 2021-10-05 PROCEDURE — 87040 BLOOD CULTURE FOR BACTERIA: CPT

## 2021-10-05 PROCEDURE — 87186 SC STD MICRODIL/AGAR DIL: CPT

## 2021-10-05 PROCEDURE — 83605 ASSAY OF LACTIC ACID: CPT

## 2021-10-05 PROCEDURE — 93005 ELECTROCARDIOGRAM TRACING: CPT | Performed by: EMERGENCY MEDICINE

## 2021-10-05 PROCEDURE — 71045 X-RAY EXAM CHEST 1 VIEW: CPT

## 2021-10-05 PROCEDURE — 74011000258 HC RX REV CODE- 258: Performed by: EMERGENCY MEDICINE

## 2021-10-05 PROCEDURE — 87077 CULTURE AEROBIC IDENTIFY: CPT

## 2021-10-05 PROCEDURE — 74011250636 HC RX REV CODE- 250/636: Performed by: FAMILY MEDICINE

## 2021-10-05 PROCEDURE — 87205 SMEAR GRAM STAIN: CPT

## 2021-10-05 PROCEDURE — 36415 COLL VENOUS BLD VENIPUNCTURE: CPT

## 2021-10-05 PROCEDURE — 99284 EMERGENCY DEPT VISIT MOD MDM: CPT

## 2021-10-05 PROCEDURE — 83036 HEMOGLOBIN GLYCOSYLATED A1C: CPT

## 2021-10-05 PROCEDURE — 5A09357 ASSISTANCE WITH RESPIRATORY VENTILATION, LESS THAN 24 CONSECUTIVE HOURS, CONTINUOUS POSITIVE AIRWAY PRESSURE: ICD-10-PCS | Performed by: FAMILY MEDICINE

## 2021-10-05 RX ORDER — ACETAMINOPHEN 325 MG/1
650 TABLET ORAL
Status: DISCONTINUED | OUTPATIENT
Start: 2021-10-05 | End: 2021-10-13 | Stop reason: HOSPADM

## 2021-10-05 RX ORDER — SODIUM CHLORIDE 9 MG/ML
100 INJECTION, SOLUTION INTRAVENOUS CONTINUOUS
Status: DISCONTINUED | OUTPATIENT
Start: 2021-10-05 | End: 2021-10-07

## 2021-10-05 RX ORDER — VANCOMYCIN 2 GRAM/500 ML IN 0.9 % SODIUM CHLORIDE INTRAVENOUS
2 ONCE
Status: COMPLETED | OUTPATIENT
Start: 2021-10-05 | End: 2021-10-05

## 2021-10-05 RX ORDER — DEXTROSE 40 %
15 GEL (GRAM) ORAL AS NEEDED
Status: DISCONTINUED | OUTPATIENT
Start: 2021-10-05 | End: 2021-10-13 | Stop reason: HOSPADM

## 2021-10-05 RX ORDER — INSULIN LISPRO 100 [IU]/ML
INJECTION, SOLUTION INTRAVENOUS; SUBCUTANEOUS
Status: DISCONTINUED | OUTPATIENT
Start: 2021-10-05 | End: 2021-10-13 | Stop reason: HOSPADM

## 2021-10-05 RX ORDER — FLUTICASONE PROPIONATE 50 MCG
2 SPRAY, SUSPENSION (ML) NASAL DAILY
Status: DISCONTINUED | OUTPATIENT
Start: 2021-10-06 | End: 2021-10-13 | Stop reason: HOSPADM

## 2021-10-05 RX ORDER — ACETAMINOPHEN 650 MG/1
650 SUPPOSITORY RECTAL
Status: DISCONTINUED | OUTPATIENT
Start: 2021-10-05 | End: 2021-10-13 | Stop reason: HOSPADM

## 2021-10-05 RX ORDER — ONDANSETRON 4 MG/1
4 TABLET, ORALLY DISINTEGRATING ORAL
Status: DISCONTINUED | OUTPATIENT
Start: 2021-10-05 | End: 2021-10-13 | Stop reason: HOSPADM

## 2021-10-05 RX ORDER — VANCOMYCIN/0.9 % SOD CHLORIDE 1.5G/250ML
1500 PLASTIC BAG, INJECTION (ML) INTRAVENOUS EVERY 24 HOURS
Status: DISCONTINUED | OUTPATIENT
Start: 2021-10-06 | End: 2021-10-07

## 2021-10-05 RX ORDER — DEXTROSE 50 % IN WATER (D50W) INTRAVENOUS SYRINGE
25-50 AS NEEDED
Status: DISCONTINUED | OUTPATIENT
Start: 2021-10-05 | End: 2021-10-13 | Stop reason: HOSPADM

## 2021-10-05 RX ORDER — SOTALOL HYDROCHLORIDE 80 MG/1
40 TABLET ORAL 2 TIMES DAILY
Status: DISCONTINUED | OUTPATIENT
Start: 2021-10-05 | End: 2021-10-13 | Stop reason: HOSPADM

## 2021-10-05 RX ORDER — TRAZODONE HYDROCHLORIDE 50 MG/1
75 TABLET ORAL
Status: DISCONTINUED | OUTPATIENT
Start: 2021-10-05 | End: 2021-10-13 | Stop reason: HOSPADM

## 2021-10-05 RX ORDER — SODIUM CHLORIDE 0.9 % (FLUSH) 0.9 %
5-10 SYRINGE (ML) INJECTION AS NEEDED
Status: DISCONTINUED | OUTPATIENT
Start: 2021-10-05 | End: 2021-10-13 | Stop reason: HOSPADM

## 2021-10-05 RX ORDER — METRONIDAZOLE 500 MG/100ML
500 INJECTION, SOLUTION INTRAVENOUS ONCE
Status: COMPLETED | OUTPATIENT
Start: 2021-10-05 | End: 2021-10-05

## 2021-10-05 RX ORDER — NITROGLYCERIN 0.4 MG/1
0.4 TABLET SUBLINGUAL AS NEEDED
Status: DISCONTINUED | OUTPATIENT
Start: 2021-10-05 | End: 2021-10-13 | Stop reason: HOSPADM

## 2021-10-05 RX ORDER — ONDANSETRON 2 MG/ML
4 INJECTION INTRAMUSCULAR; INTRAVENOUS
Status: DISCONTINUED | OUTPATIENT
Start: 2021-10-05 | End: 2021-10-13 | Stop reason: HOSPADM

## 2021-10-05 RX ORDER — INSULIN GLARGINE 100 [IU]/ML
50 INJECTION, SOLUTION SUBCUTANEOUS DAILY
Status: DISCONTINUED | OUTPATIENT
Start: 2021-10-06 | End: 2021-10-06

## 2021-10-05 RX ORDER — SODIUM CHLORIDE 0.9 % (FLUSH) 0.9 %
5-40 SYRINGE (ML) INJECTION AS NEEDED
Status: DISCONTINUED | OUTPATIENT
Start: 2021-10-05 | End: 2021-10-13 | Stop reason: HOSPADM

## 2021-10-05 RX ORDER — LOSARTAN POTASSIUM 50 MG/1
100 TABLET ORAL DAILY
Status: DISCONTINUED | OUTPATIENT
Start: 2021-10-06 | End: 2021-10-12

## 2021-10-05 RX ORDER — SODIUM CHLORIDE 0.9 % (FLUSH) 0.9 %
5-10 SYRINGE (ML) INJECTION EVERY 8 HOURS
Status: DISCONTINUED | OUTPATIENT
Start: 2021-10-05 | End: 2021-10-05

## 2021-10-05 RX ORDER — SODIUM CHLORIDE 0.9 % (FLUSH) 0.9 %
5-40 SYRINGE (ML) INJECTION EVERY 8 HOURS
Status: DISCONTINUED | OUTPATIENT
Start: 2021-10-05 | End: 2021-10-13 | Stop reason: HOSPADM

## 2021-10-05 RX ORDER — FAMOTIDINE 20 MG/1
20 TABLET, FILM COATED ORAL 2 TIMES DAILY
Status: DISCONTINUED | OUTPATIENT
Start: 2021-10-05 | End: 2021-10-13 | Stop reason: HOSPADM

## 2021-10-05 RX ORDER — VANCOMYCIN HYDROCHLORIDE
1250
Status: DISCONTINUED | OUTPATIENT
Start: 2021-10-06 | End: 2021-10-05

## 2021-10-05 RX ORDER — CLINDAMYCIN PHOSPHATE 600 MG/50ML
600 INJECTION INTRAVENOUS EVERY 8 HOURS
Status: DISCONTINUED | OUTPATIENT
Start: 2021-10-05 | End: 2021-10-05

## 2021-10-05 RX ORDER — EZETIMIBE 10 MG/1
10 TABLET ORAL DAILY
Status: DISCONTINUED | OUTPATIENT
Start: 2021-10-06 | End: 2021-10-13 | Stop reason: HOSPADM

## 2021-10-05 RX ORDER — AMLODIPINE BESYLATE 2.5 MG/1
5 TABLET ORAL DAILY
Status: DISCONTINUED | OUTPATIENT
Start: 2021-10-06 | End: 2021-10-07

## 2021-10-05 RX ORDER — VANCOMYCIN 2 GRAM/500 ML IN 0.9 % SODIUM CHLORIDE INTRAVENOUS
2 ONCE
Status: DISCONTINUED | OUTPATIENT
Start: 2021-10-05 | End: 2021-10-05

## 2021-10-05 RX ORDER — POLYETHYLENE GLYCOL 3350 17 G/17G
17 POWDER, FOR SOLUTION ORAL DAILY PRN
Status: DISCONTINUED | OUTPATIENT
Start: 2021-10-05 | End: 2021-10-13 | Stop reason: HOSPADM

## 2021-10-05 RX ORDER — PREDNISONE 10 MG/1
5 TABLET ORAL 2 TIMES DAILY WITH MEALS
Status: DISCONTINUED | OUTPATIENT
Start: 2021-10-05 | End: 2021-10-13 | Stop reason: HOSPADM

## 2021-10-05 RX ADMIN — METRONIDAZOLE 500 MG: 500 INJECTION, SOLUTION INTRAVENOUS at 20:04

## 2021-10-05 RX ADMIN — INSULIN LISPRO 6 UNITS: 100 INJECTION, SOLUTION INTRAVENOUS; SUBCUTANEOUS at 21:00

## 2021-10-05 RX ADMIN — CEFEPIME HYDROCHLORIDE 2 G: 2 INJECTION, POWDER, FOR SOLUTION INTRAVENOUS at 17:45

## 2021-10-05 RX ADMIN — VANCOMYCIN HYDROCHLORIDE 2000 MG: 10 INJECTION, POWDER, LYOPHILIZED, FOR SOLUTION INTRAVENOUS at 20:03

## 2021-10-05 RX ADMIN — PREDNISONE 5 MG: 10 TABLET ORAL at 20:04

## 2021-10-05 RX ADMIN — TRAZODONE HYDROCHLORIDE 75 MG: 50 TABLET ORAL at 21:00

## 2021-10-05 RX ADMIN — SODIUM CHLORIDE 100 ML/HR: 900 INJECTION, SOLUTION INTRAVENOUS at 20:03

## 2021-10-05 RX ADMIN — FAMOTIDINE 20 MG: 20 TABLET ORAL at 20:06

## 2021-10-05 RX ADMIN — Medication 10 ML: at 21:00

## 2021-10-05 NOTE — ED PROVIDER NOTES
Patient is to have surgery tomorrow morning with Dr. Jeralene Aase to debride a foot abscess. Emergency department has been asked to evaluate the patient and begin broad-spectrum antibiotics and evaluate his other medical conditions. Orthopedics has asked that the hospitalist admit the patient with Dr. Jeralene Aase to perform I&D and debridement in the operating room in the morning. Patient most recently on clindamycin with worsening symptoms and apparently there are no outstanding wound cultures to guide treatment. Dr. Jeralene Aase is aware the patient will be admitted by the hospitalist and will schedule surgery in the morning. Hospitalist services not yet aware. Madeline Frankel MD    Patient with chronic recurring left foot abscess and diabetic foot wound. Worsening symptoms over the last 5 days despite being on clindamycin. Patient febrile upon arrival.  Patient took his Xarelto this morning. Review of systems reveals a chronic cough no new or acute symptoms.             Past Medical History:   Diagnosis Date    Arrhythmia     Atrial fibrillation (HCC)     Bradycardia, sinus     pacer    CAD (coronary artery disease) 12/2017, 6/9/2018    2 stents to LAD, documented in chart, patient states 10 stents total    Cancer ([address] 2002    Prostate    Chronic obstructive pulmonary disease (HCC)     Asbestos    Chronic pain     Low back pain    Diabetes ([address]     Type2, , Low's 85, Last A1C 7.4 per Pt    GERD (gastroesophageal reflux disease)     Hypertension     Neuropathy     Pacemaker     PVD (peripheral vascular disease) ([address]     Sleep apnea     wears CPAP       Past Surgical History:   Procedure Laterality Date    HX APPENDECTOMY  1996    HX COLONOSCOPY  2018    HX HEART CATHETERIZATION  12/19/2017    1 stent    HX HEART CATHETERIZATION  06/09/2018    1 stent    HX HEENT Left     Laser to left eye    HX LUMBAR LAMINECTOMY  03/27/2019    L2-L3    HX OTHER SURGICAL Bilateral     Carpal tunnel release    HX OTHER SURGICAL  1980    Rectal fiser repair    HX PACEMAKER  2016    Dual chamber pacer, [facility] [facility]    HX PROSTATECTOMY  2002    HX ROTATOR CUFF REPAIR Right     HX UROLOGICAL  2002    Prostatectomy    HX UROLOGICAL  2003    Penile pump    NEUROLOGICAL PROCEDURE UNLISTED      L4-L5 titanium implants, Fusion    NEUROLOGICAL PROCEDURE UNLISTED      Laser surgery L4-L5    [place] CARDIAC SURG PROCEDURE UNLIST  2016    A-Fib Ablation         Family History:   Problem Relation Age of Onset    Diabetes Mother     Hypertension Mother     Stroke Mother     Lung Disease Father     Cancer Brother         Mental cell lymphoma    Heart Disease Brother     Diabetes Brother     Lung Disease Brother     Diabetes Brother     Hypertension Brother     Cancer Sister         esphogeal CA       Social History     Socioeconomic History    Marital status:      Spouse name: Not on file    Number of children: Not on file    Years of education: Not on file    Highest education level: Not on file   Occupational History    Not on file   Tobacco Use    Smoking status: Former Smoker     Packs/day: 2.00     Years: 25.00     Pack years: 50.00     Quit date:      Years since quittin.7    Smokeless tobacco: Never Used   Substance and Sexual Activity    Alcohol use: No    Drug use: No    Sexual activity: Not on file   Other Topics Concern     Service Not Asked    Blood Transfusions Not Asked    Caffeine Concern Not Asked    Occupational Exposure Not Asked    Hobby Hazards Not Asked    Sleep Concern Not Asked    Stress Concern Not Asked    Weight Concern Not Asked    Special Diet Not Asked    Back Care Not Asked    Exercise Not Asked    Bike Helmet Not Asked    [address] Not Asked    Self-Exams Not Asked   Social History Narrative    Not on file     Social Determinants of Health     Financial Resource Strain:     Difficulty of Paying Living Expenses:    Food Insecurity:     Worried About Running Out of Food in the Last Year:     [address] in the Last Year:    Transportation Needs:     Lack of Transportation (Medical):  Lack of Transportation (Non-Medical):    Physical Activity:     Days of Exercise per Week:     Minutes of Exercise per Session:    Stress:     Feeling of Stress :    Social Connections:     Frequency of Communication with Friends and Family:     Frequency of Social Gatherings with Friends and Family:     Attends Religion Services:     Active Member of Clubs or Organizations:     Attends Club or Organization Meetings:     Marital Status:    Intimate Partner Violence:     Fear of Current or Ex-Partner:     Emotionally Abused:     Physically Abused:     Sexually Abused: ALLERGIES: Doxycycline, Ticagrelor, Amoxicillin-pot clavulanate, Codeine, Oxycodone-acetaminophen, Sulfa (sulfonamide antibiotics), Tyloxapol, Sulfa dyne, and Tape [adhesive]    Review of Systems   Constitutional: Positive for fever. Negative for chills. HENT: Negative for congestion, rhinorrhea and sore throat. Eyes: Negative for photophobia and redness. Respiratory: Positive for cough. Negative for shortness of breath. Dyspnea on exertion   Cardiovascular: Negative for chest pain and leg swelling. Gastrointestinal: Negative for abdominal pain, diarrhea, nausea and vomiting. Endocrine: Negative for polydipsia and polyuria. Genitourinary: Negative for dysuria and hematuria. Musculoskeletal: Negative for back pain and myalgias. Neurological: Negative for weakness, numbness and headaches. There were no vitals filed for this visit. Physical Exam  Vitals and nursing note reviewed. Constitutional:       Appearance: He is well-developed. HENT:      Mouth/Throat:      Pharynx: No oropharyngeal exudate. Eyes:      Conjunctiva/sclera: Conjunctivae normal.      Pupils: Pupils are equal, round, and reactive to light. Cardiovascular:      Rate and Rhythm: Normal rate and regular rhythm. Heart sounds: Normal heart sounds. Pulmonary:      Effort: Pulmonary effort is normal.      Breath sounds: Normal breath sounds. Abdominal:      General: Bowel sounds are normal. There is no distension. Palpations: Abdomen is soft. Tenderness: There is no abdominal tenderness. There is no guarding or rebound. Musculoskeletal:         General: Tenderness ( Left foot has a abscess present on the plantar surface with some drainage dorsally as well. Area is erythematous and tender with the erythema extending into the great toe.) present. Normal range of motion. Cervical back: Neck supple. Lymphadenopathy:      Cervical: No cervical adenopathy. Skin:     General: Skin is warm and dry. Neurological:      Mental Status: He is alert and oriented to person, place, and time. MDM  Number of Diagnoses or Management Options  Abscess of left foot  Diagnosis management comments: Chronic recurring infection with planned surgery tomorrow morning. Admit to hospitalist.  Broad-spectrum antibiotics provided. Wound cultured and blood cultured. Amount and/or Complexity of Data Reviewed  Clinical lab tests: ordered and reviewed  Tests in the radiology section of CPT®: ordered and reviewed  Review and summarize past medical records: yes (Name: Eric Padilla  YOB: 1945  Gender: male  MRN: [id number]       CC: (Left foot pain)     HPI:   History obtained from the patient who appears to be a good historian  History obtained from care everywhere:  02/12/2018: Left first metatarsal osteotomy: Sesamoidectomy: Dr. Parish Frye  11/04/2019: Left foot hardware removal: Dr. Parish Frye  04/14/2020: Office note revealing recurrent chronic foot ulceration. Over the year, he has had persistent ulceration under the left great toe.   He has tried insoles that are now a couple of years old and routine debridements from Dr. Dave Cross.     07/12/2021: Last visit with me. Since my visit, he reports developing problems with the ulcer. He was seen at the wound center in August but then had to delay returning to wound care until 09/30/2021 due to having Covid. Wound care placed along clindamycin and that did appear to resolve some red streaks but he feels now that there is more swelling in his big toe. Maximum temperature 100.7. He does have night sweats but no fevers or chills.     ROS/Meds/PSH/PMH/FH/SH: reviewed today    Tobacco:  reports that he quit smoking about 36 years ago. He has a 50.00 pack-year smoking history. He has never used smokeless tobacco.   Pacemaker with multiple heart stents: PVD with left lower extremity stent      Physical Examination:  Patient appears to be alert and oriented with acceptable appearance.   No obvious distress or SOB  CV: appears to have acceptable vascular color and capillary refill  Neuro: appears to have mostly intact light touch sensation   Skin: Right dorsal foot abscess; surrounding erythema; nothing above the midfoot  MS: Standing: Cavovarus feet: Left great toe cock-up deformity: Gait full  Left plantar foot ulcer with dorsal abscess     XR: Left side: Standing AP lateral mortise ankle plus AP oblique foot taken today with arthritic varus ankle with exostoses; fifth metatarsal exostoses; tibial sesamoidectomy; soft tissue lucencies dorsal first MTP joint consistent with dorsal first MTP joint abscess  XR Impression:  As above       Test/Records/Documents:  I was able to independently review outside medical records: Care everywhere      Assessment:    Left varus ankle with cavovarus deformity  Left first metatarsal head ulceration with cock-up deformity great toe, dorsal foot cellulitis with dorsal foot abscess     Plan:   The patient and I discussed the above acute and chronic diagnoses and explored different options.      He presents today with a new condition of the dorsal foot abscess. With my thumb debilities, I am unable to perform over surgery. Consultation today with Dr. Ashley Sanchez. Dr. Ashley Sanchez and I saw the patient together and agree with the plan for IV antibiotic care and debridement with Dr. Ashley Sanchez tomorrow     With the patient's multiple medical conditions, plan is for him to go to the emergency room to see the ER doctor and be admitted to the hospitalist for all of his multiple medical conditions and then consult Dr. Ashley Sanchez directly.     Dr. Ashley Sanchez is well aware of this patient with the plans for surgery.     Dr. Ashley Sanchez to discuss debridement with possible metatarsal head resection, Achilles lengthening and then if that fails, partial foot amputation.     Advanced medical imaging: At this point I see no indication for a MRI scan since he has a rc abscess.     He has a walker boot but is not wearing it today.     He was placed in a dressing .     He will go straight to [facility]. I discussed the case personally with Dr. Den Juarez.   Plan for Dr. Den Juarez to see the patient, have hospitalist admit, hold blood thinners if that is appropriate with hospitalist and broad-spectrum antibiotics for his abscess and cellulitis     Medication -he is currently on doxycycline from the wound center     Work status: Out of work     Word/phrase syntax errors may be present related to using Dragon voice recognition software )    Risk of Complications, Morbidity, and/or Mortality  Presenting problems: high  Diagnostic procedures: low  Management options: moderate    Patient Progress  Patient progress: stable         Procedures

## 2021-10-05 NOTE — PROGRESS NOTES
[facility] Pharmacokinetic Monitoring Service - Vancomycin     Mic Narvaez is a 77 y.o. male starting on vancomycin therapy for skin and soft tissue infection. Pharmacy consulted by   for monitoring and adjustment. Target Concentration: Goal trough of 10-15 mg/L and AUC/MARIA ELENA <500 mg*hr/L    Additional Antimicrobials: cefepime    Pertinent Laboratory Values: Wt Readings from Last 1 Encounters:   10/05/21 120.2 kg (265 lb)     Temp Readings from Last 1 Encounters:   10/05/21 (!) 101.4 °F (38.6 °C)     No components found for: PROCAL  Recent Labs     10/05/21  1652   BUN 21   CREA 1.38   WBC 11.1   LAC 1.1     Estimated Creatinine Clearance: 64.6 mL/min (based on SCr of 1.38 mg/dL). No results found for: Funmi Dougherty    MRSA Nasal Swab: N/A. Non-respiratory infection. .    Plan:  Dosing recommendations based on Bayesian software  Start vancomycin 2000 mg x 1 and then 1500 mg every 24 hours for now  Anticipated AUC of 465 and trough concentration of 14.3 at steady state  Renal labs as indicated   Vancomycin concentration ordered for 10/7 @ 0400   Pharmacy will continue to monitor patient and adjust therapy as indicated    Thank you for the consult,  Blas Banda  10/5/2021 7:01 PM

## 2021-10-05 NOTE — ED TRIAGE NOTES
Pt arrives via pov c/o left foot infection that has been there for seven years. Pt has had several surgeries to the foot. Pt presents tachypneic and has lung hx. Hx of cardiac stents.

## 2021-10-05 NOTE — H&P
[facility]   History and Physical       Name:  Rikki Ayers  Age:76 y.o. Sex:male   :  1945    MRN:  [id number]   PCP:  Herb Dinh MD      Admit Date:  10/5/2021  4:54 PM   Chief Complaint: Left foot big toe swelling, left foot plantar aspect ulcer discharge  Reason for Admission:   Abscess of left foot [L02.612]    Assessment & Plan:   -Abscess left foot region with chronic diabetic foot ulcer  Continue broad-spectrum antibiotics with cefepime and vancomycin    -Diabetes mellitus type 2/diabetic neuropathy bilateral feet  Patient on Tresiba at home  Presently here on sliding scale and Lantus    -History of A. Fib/pacemaker  On sotalol and Xarelto  Reviewed the dosage of sotalol. Patient took Xarelto today, need to start on heparin drip from tomorrow probably no bolus. -Hyperlipidemia  Continue Zetia    -GERD  Continue famotidine    -Hypertension  Continue amlodipine and losartan    -Sleep apnea  Continue CPAP    -Polymyalgia rheumatica  Continue on prednisone  Needs Solu-Cortef prior to surgery    -COPD  As needed nebs    -Obesity with a BMI of 33.12        Diet: DIET NPO  VTE ppx: Xarelto held, will be started on heparin from tomorrow  GI ppx: Pepcid  CODE STATUS: Full Code        History of Presenting Illness:     Rikki Ayers is a 77 y.o. male with medical history of chronic left foot diabetic ulcer, polymyalgia rheumatica on prednisone, A. fib on sotalol, s/p pacemaker, COPD, diabetes mellitus type 2 on Tresiba, hypertension only on losartan/amlodipine, hyperlipidemia, obesity, A. fib on Xarelto, coronary disease with multiple stents acid reflux, diabetic neuropathy and obstructive sleep apnea on CPAP. Patient went to see Dr. Mercedes Counts patient orthopedic physician for swelling over the left big toe and drainage over the left foot ulcer. Dr. Lilian Tran can do any surgery, spoke to Dr. Odin Fleming who will be doing surgery left foot.   Patient was sent to emergency room for admission. Apart from patient complaining of swelling over the left big toe lateral aspect patient with redness, drainage over the left foot plantar ulcer, of the 10 review of stones apart from the one mentioned above patient other review of systems negative noncontributory    Patient otherwise did not complain of any fever, does not complain of any significant pain secondary to neuropathy, no shortness of breath or chest pain or nausea vomiting or abdominal pain    Review of Systems:  A 14 point review of systems was taken and pertinent positive as per HPI.         Past Medical History:   Diagnosis Date    Arrhythmia     Atrial fibrillation (HCC)     Bradycardia, sinus     pacer    CAD (coronary artery disease) 12/2017, 6/9/2018    2 stents to LAD, documented in chart, patient states 10 stents total    Cancer ([address] 2002    Prostate    Chronic obstructive pulmonary disease (HCC)     Asbestos    Chronic pain     Low back pain    Diabetes ([address]     Type2, , Low's 85, Last A1C 7.4 per Pt    GERD (gastroesophageal reflux disease)     Hypertension     Neuropathy     Pacemaker     PVD (peripheral vascular disease) ([address]     Sleep apnea     wears CPAP       Past Surgical History:   Procedure Laterality Date    HX APPENDECTOMY  1996    HX COLONOSCOPY  2018    HX HEART CATHETERIZATION  12/19/2017    1 stent    HX HEART CATHETERIZATION  06/09/2018    1 stent    HX HEENT Left     Laser to left eye    HX LUMBAR LAMINECTOMY  03/27/2019    L2-L3    HX OTHER SURGICAL Bilateral     Carpal tunnel release    HX OTHER SURGICAL  1980    Rectal fiser repair    HX PACEMAKER  05/06/2016    Dual chamber pacer, [place]    HX PROSTATECTOMY  08/2002    HX ROTATOR CUFF REPAIR Right     HX UROLOGICAL  08/08/2002    Prostatectomy    HX UROLOGICAL  2003    Penile pump    NEUROLOGICAL PROCEDURE UNLISTED  1998    L4-L5 titanium implants, Fusion    NEUROLOGICAL PROCEDURE UNLISTED  1996 Laser surgery L4-L5    CT CARDIAC SURG PROCEDURE UNLIST  2016    A-Fib Ablation       Family History : reviewed  Family History   Problem Relation Age of Onset    Diabetes Mother     Hypertension Mother     Stroke Mother     Lung Disease Father     Cancer Brother         Mental cell lymphoma    Heart Disease Brother     Diabetes Brother     Lung Disease Brother     Diabetes Brother     Hypertension Brother     Cancer Sister         esperanza LAY        Social History     Tobacco Use    Smoking status: Former Smoker     Packs/day: 2.00     Years: 25.00     Pack years: 50.00     Quit date:      Years since quittin.7    Smokeless tobacco: Never Used   Substance Use Topics    Alcohol use: No       Allergies   Allergen Reactions    Doxycycline Hives and Rash    Ticagrelor Shortness of Breath    Amoxicillin-Pot Clavulanate Unknown (comments)     Jaundice  Other reaction(s): jaundice    Codeine Itching    Oxycodone-Acetaminophen Other (comments), Itching and Hives    Sulfa (Sulfonamide Antibiotics) Rash and Hives     Other reaction(s): Hives/Swelling-Allergy, Itching-Allergy, Rash-Allergy    Tyloxapol Other (comments)     Hallucinates    Sulfa Dyne Hives    Tape [Adhesive] Other (comments)     \"takes skin off/blisters\"       Immunization History   Administered Date(s) Administered    COVID-19, PFIZER, MRNA, LNP-S, PF, 30MCG/0.3ML DOSE 2021         PTA Medications:  Current Outpatient Medications   Medication Instructions    amLODIPine (NORVASC) 5 mg, Oral, DAILY    aspirin 81 mg chewable tablet 1 tablet    azithromycin (ZITHROMAX) 250 mg tablet TAKE 2 TABLETS BY MOUTH TODAY, THEN TAKE 1 TABLET DAILY FOR 4 DAYS    cefpodoxime (VANTIN) 200 mg tablet TAKE 1 TABLET BY MOUTH TWICE DAILY FOR 10 DAYS    clindamycin (CLEOCIN) 300 mg capsule TAKE 1 CAPSULE 3 TIMES A DAY FOR 21 DAYS FOR INFECTION OF THE SKIN AND/OR SOFT TISSUE.     ezetimibe (ZETIA) 10 mg tablet No dose, route, or frequency recorded.  famotidine (PEPCID) 20 mg, Oral, 2 TIMES DAILY    fluticasone propionate (Flonase Allergy Relief) 50 mcg/actuation nasal spray 1 spray in each nostril    glimepiride (AMARYL) 4 mg, Oral, DAILY    glimepiride (AMARYL) 4 mg, EVERY MORNING    HYDROcodone-acetaminophen (NORCO) 5-325 mg per tablet No dose, route, or frequency recorded.  losartan (COZAAR) 100 mg tablet 1 tablet    losartan (COZAAR) 50 mg tablet TAKE 2 TABLETS BY MOUTH EVERY DAY    losartan (COZAAR) 100 mg, DAILY    losartan-hydroCHLOROthiazide (HYZAAR) 100-12.5 mg per tablet No dose, route, or frequency recorded.  losartan-hydroCHLOROthiazide (HYZAAR) 100-12.5 mg per tablet 1 Tablet, Oral, DAILY    nitroglycerin (NITROSTAT) 0.4 mg, SubLINGual    NovoLOG Flexpen U-100 Insulin 100 unit/mL (3 mL) inpn INJECT 3 TO 5 UNITS UNDER THE SKIN 3 TIMES A DAY AS NEEDED (BS GREATER THAN 200).  ondansetron hcl (ZOFRAN) 4 mg tablet TAKE 1 TABLET BY MOUTH EVERY 8 HOURS AS NEEDED FOR NAUSEA OR VOMITING FOR UP TO 7 DAYS    pantoprazole (PROTONIX) 40 mg tablet 1 tablet    predniSONE (DELTASONE) 10 mg, DAILY    predniSONE (DELTASONE) 5 mg, Oral, DAILY    rivaroxaban (XARELTO) 20 mg, Oral, DAILY    sotaloL (BETAPACE) 40 mg, Oral, 2 TIMES DAILY, Take / use AM day of surgery  per anesthesia protocols.  sucralfate (CARAFATE) 1 gram tablet Indications: history of gastric erosions, abdominal pain. Take before meals and at bedtime. Take one hour before other meds and 3 hrs after other medications.  traMADoL (ULTRAM) 50 mg tablet No dose, route, or frequency recorded.     traZODone (DESYREL) 100 mg tablet 1 tablet at bedtime    traZODone (DESYREL) 150 mg tablet TAKE 1 TABLET BY MOUTH EVERY DAY    traZODone (DESYREL) 75 mg, Oral, EVERY BEDTIME    Jackalyn Hurst FlexTouch U-100 24 Units, SubCUTAneous, DAILY    Jackalyn Hurst FlexTouch U-200 200 unit/mL (3 mL) inpn pen INJECT 44 UNITS UNDER THE SKIN IN THE MORNING       Objective:     Patient Vitals for the past 24 hrs:   Temp Pulse Resp BP SpO2   10/05/21 2000 -- 74 -- -- --   10/05/21 1930 98.7 °F (37.1 °C) 72 22 (!) 168/88 97 %   10/05/21 1639 (!) 101.4 °F (38.6 °C) 74 28 137/62 97 %       Oxygen Therapy  O2 Sat (%): 97 % (10/05/21 1930)  O2 Device: None (Room air) (10/05/21 1954)    Body mass index is 33.12 kg/m². Physical Exam:    General: No acute distress, speaking in full sentences, no use of accessory muscles   HEENT: Pupils equal and reactive to light and accommodation, oropharynx is clear   Neck: Supple, no lymphadenopathy, no JVD   Lungs: Clear to auscultation bilaterally   Cardiovascular: Regular rate and rhythm with normal S1 and S2   Abdomen: Soft, nontender, nondistended, normoactive bowel sounds   Extremities: No cyanosis clubbing or edema   Neuro: Nonfocal, A&O x3. Decreased sensation bilateral feet. Psych: Normal mood and affect   Skin-inflammatory changes with fluctuant fluid over the left big toe lateral aspect, large plantar aspect ulcer with erythema and slough left foot. Data Reviewed: I have reviewed all labs, meds, and studies. Recent Results (from the past 24 hour(s))   LACTIC ACID    Collection Time: 10/05/21  4:52 PM   Result Value Ref Range    Lactic acid 1.1 0.4 - 2.0 MMOL/L   CBC WITH AUTOMATED DIFF    Collection Time: 10/05/21  4:52 PM   Result Value Ref Range    WBC 11.1 4.3 - 11.1 K/uL    RBC 4.33 4.23 - 5.6 M/uL    HGB 13.1 (L) 13.6 - 17.2 g/dL    HCT 40.3 (L) 41.1 - 50.3 %    MCV 93.1 79.6 - 97.8 FL    MCH 30.3 26.1 - 32.9 PG    MCHC 32.5 31.4 - 35.0 g/dL    RDW 12.4 11.9 - 14.6 %    PLATELET 791 858 - 960 K/uL    MPV 9.3 (L) 9.4 - 12.3 FL    ABSOLUTE NRBC 0.00 0.0 - 0.2 K/uL    DF AUTOMATED      NEUTROPHILS 73 43 - 78 %    LYMPHOCYTES 16 13 - 44 %    MONOCYTES 8 4.0 - 12.0 %    EOSINOPHILS 2 0.5 - 7.8 %    BASOPHILS 0 0.0 - 2.0 %    IMMATURE GRANULOCYTES 1 0.0 - 5.0 %    ABS. NEUTROPHILS 8.2 1.7 - 8.2 K/UL    ABS.  LYMPHOCYTES 1.8 0.5 - 4.6 K/UL    ABS. MONOCYTES 0.9 0.1 - 1.3 K/UL    ABS. EOSINOPHILS 0.3 0.0 - 0.8 K/UL    ABS. BASOPHILS 0.0 0.0 - 0.2 K/UL    ABS. IMM. GRANS. 0.1 0.0 - 0.5 K/UL   METABOLIC PANEL, COMPREHENSIVE    Collection Time: 10/05/21  4:52 PM   Result Value Ref Range    Sodium 134 (L) 138 - 145 mmol/L    Potassium 4.4 3.5 - 5.1 mmol/L    Chloride 99 98 - 107 mmol/L    CO2 28 21 - 32 mmol/L    Anion gap 7 7 - 16 mmol/L    Glucose 118 (H) 65 - 100 mg/dL    BUN 21 8 - 23 MG/DL    Creatinine 1.38 0.8 - 1.5 MG/DL    GFR est AA >60 >60 ml/min/1.73m2    GFR est non-AA 53 (L) >60 ml/min/1.73m2    Calcium 9.0 8.3 - 10.4 MG/DL    Bilirubin, total 1.2 (H) 0.2 - 1.1 MG/DL    ALT (SGPT) 135 (H) 12 - 65 U/L    AST (SGOT) 96 (H) 15 - 37 U/L    Alk.  phosphatase 109 50 - 136 U/L    Protein, total 8.3 (H) 6.3 - 8.2 g/dL    Albumin 2.7 (L) 3.2 - 4.6 g/dL    Globulin 5.6 (H) 2.3 - 3.5 g/dL    A-G Ratio 0.5 (L) 1.2 - 3.5     HEMOGLOBIN A1C WITH EAG    Collection Time: 10/05/21  4:52 PM   Result Value Ref Range    Hemoglobin A1c 7.9 (H) 4.2 - 6.3 %    Est. average glucose 180 mg/dL   GLUCOSE, POC    Collection Time: 10/05/21  8:44 PM   Result Value Ref Range    Glucose (POC) 206 (H) 65 - 100 mg/dL    Performed by Sarah        EKG Results     Procedure 720 Value Units Date/Time    EKG, 12 LEAD, INITIAL [[id number]]     Order Status: Completed           All Micro Results     Procedure Component Value Units Date/Time    CULTURE, Barrera Dill STAIN [[id number]] Collected: 10/05/21 1158    Order Status: Completed Specimen: Wound Drainage Updated: 10/05/21 2019    BLOOD CULTURE [[id number]] Collected: 10/05/21 1754    Order Status: Completed Specimen: Blood Updated: 10/05/21 1807    BLOOD CULTURE [[id number]] Collected: 10/05/21 1652    Order Status: Completed Specimen: Blood Updated: 10/05/21 1729          Other Studies:  XR ANKLE LT MIN 3 V    Result Date: 10/5/2021  AUTOMATIC ADMINISTRATIVE RESULT The result for this exam can be found in the Progress note in the chart.     : See Progress note in the chart. XR FOOT LT AP/LAT    Result Date: 10/5/2021  AUTOMATIC ADMINISTRATIVE RESULT The result for this exam can be found in the Progress note in the chart.     : See Progress note in the chart. XR CHEST PORT    Result Date: 10/5/2021  Chest portable CLINICAL INDICATION: Acute tachypnea and shortness of breath, history of cardiac stents and prostate cancer, hypertension, pacemaker, coronary artery disease, bradycardia, atrial fibrillation, COPD, sleep apnea, diabetes type 2, GERD and asbestos exposure COMPARISON: No prior thoracic imaging. Correlation with lumbar spine CT 6/7/2021 TECHNIQUE: single AP portable view chest at 5:08 PM upright FINDINGS: Lungs are well-inflated. Left side pacemaker is noted with 2 leads, obscuring portions of the left lung. There are coronary artery stents. There is no evidence of consolidation, pneumothorax, pleural effusion or pulmonary edema. The mediastinal and hilar contours are normal given technique. No acute disease.          Medications:  Medications Administered     cefepime (MAXIPIME) 2 g in 0.9% sodium chloride (MBP/ADV) 100 mL MBP     Admin Date  10/05/2021 Action  New Bag Dose  2 g Rate  200 mL/hr Route  IntraVENous Administered By  Vadim Latif RN                    Problem List:     Hospital Problems as of 10/5/2021 Date Reviewed: 10/5/2021        Codes Class Noted - Resolved POA    * (Principal) Abscess of left foot ICD-10-CM: A72.842  ICD-9-CM: 682.7  10/5/2021 - Present Unknown        DM type 2 (diabetes mellitus, type 2) ([facility] ICD-10-CM: E11.9  ICD-9-CM: 250.00  10/5/2021 - Present Unknown        HTN (hypertension) ICD-10-CM: I10  ICD-9-CM: 401.9  10/5/2021 - Present Unknown        Sleep apnea ICD-10-CM: G47.30  ICD-9-CM: 780.57  10/5/2021 - Present Unknown        COPD (chronic obstructive pulmonary disease) ([facility] ICD-10-CM: J44.9  ICD-9-CM: 496  10/5/2021 - Present Unknown        Polymyalgia rheumatica ([facility] ICD-10-CM: M35.3  ICD-9-CM: 049  10/5/2021 - Present Unknown        Hyperlipidemia ICD-10-CM: E78.5  ICD-9-CM: 272.4  10/5/2021 - Present Unknown        Diabetic neuropathy associated with type 2 diabetes mellitus ([facility] ICD-10-CM: E11.40  ICD-9-CM: 250.60, 357.2  10/5/2021 - Present Unknown        Class 1 obesity in adult ICD-10-CM: E66.9  ICD-9-CM: 278.00  10/5/2021 - Present Unknown        CAD (coronary artery disease) ICD-10-CM: I25.10  ICD-9-CM: 414.00  10/5/2021 - Present Unknown        H/O cardiac pacemaker ICD-10-CM: Z95.0  ICD-9-CM: V12.50, V45.01  10/5/2021 - Present Unknown                   Signed By: Indu Carranza MD   [facility] Hospitalist Service    October 5, 2021  6:55 PM

## 2021-10-05 NOTE — ED NOTES
TRANSFER - OUT REPORT:    Verbal report given to Yudith Gallegos RN on Tosha Becerra  being transferred to [phone] for routine progression of care       Report consisted of patients Situation, Background, Assessment and   Recommendations(SBAR). Information from the following report(s) SBAR was reviewed with the receiving nurse. Lines:   Peripheral IV 10/05/21 Left Antecubital (Active)        Opportunity for questions and clarification was provided.       Patient transported with:   [employer]

## 2021-10-06 ENCOUNTER — ANESTHESIA EVENT (OUTPATIENT)
Dept: SURGERY | Age: 76
DRG: 617 | End: 2021-10-06
Payer: MEDICARE

## 2021-10-06 ENCOUNTER — ANESTHESIA (OUTPATIENT)
Dept: SURGERY | Age: 76
DRG: 617 | End: 2021-10-06
Payer: MEDICARE

## 2021-10-06 LAB
ANION GAP SERPL CALC-SCNC: 7 MMOL/L (ref 7–16)
APTT PPP: 38.7 SEC (ref 24.1–35.1)
ATRIAL RATE: 73 BPM
BASOPHILS # BLD: 0.1 K/UL (ref 0–0.2)
BASOPHILS NFR BLD: 1 % (ref 0–2)
BUN SERPL-MCNC: 20 MG/DL (ref 8–23)
CALCIUM SERPL-MCNC: 8.7 MG/DL (ref 8.3–10.4)
CALCULATED P AXIS, ECG09: 74 DEGREES
CALCULATED R AXIS, ECG10: 69 DEGREES
CALCULATED T AXIS, ECG11: 70 DEGREES
CHLORIDE SERPL-SCNC: 103 MMOL/L (ref 98–107)
CO2 SERPL-SCNC: 27 MMOL/L (ref 21–32)
CREAT SERPL-MCNC: 1.3 MG/DL (ref 0.8–1.5)
DIAGNOSIS, 93000: NORMAL
DIFFERENTIAL METHOD BLD: ABNORMAL
EOSINOPHIL # BLD: 0.2 K/UL (ref 0–0.8)
EOSINOPHIL NFR BLD: 2 % (ref 0.5–7.8)
ERYTHROCYTE [DISTWIDTH] IN BLOOD BY AUTOMATED COUNT: 12.2 % (ref 11.9–14.6)
GLUCOSE BLD STRIP.AUTO-MCNC: 102 MG/DL (ref 65–100)
GLUCOSE BLD STRIP.AUTO-MCNC: 121 MG/DL (ref 65–100)
GLUCOSE BLD STRIP.AUTO-MCNC: 133 MG/DL (ref 65–100)
GLUCOSE BLD STRIP.AUTO-MCNC: 400 MG/DL (ref 65–100)
GLUCOSE BLD STRIP.AUTO-MCNC: 69 MG/DL (ref 65–100)
GLUCOSE SERPL-MCNC: 81 MG/DL (ref 65–100)
HCT VFR BLD AUTO: 34.1 % (ref 41.1–50.3)
HGB BLD-MCNC: 11 G/DL (ref 13.6–17.2)
IMM GRANULOCYTES # BLD AUTO: 0.1 K/UL (ref 0–0.5)
IMM GRANULOCYTES NFR BLD AUTO: 1 % (ref 0–5)
LACTATE SERPL-SCNC: 0.9 MMOL/L (ref 0.4–2)
LYMPHOCYTES # BLD: 1.8 K/UL (ref 0.5–4.6)
LYMPHOCYTES NFR BLD: 19 % (ref 13–44)
MCH RBC QN AUTO: 30.4 PG (ref 26.1–32.9)
MCHC RBC AUTO-ENTMCNC: 32.3 G/DL (ref 31.4–35)
MCV RBC AUTO: 94.2 FL (ref 79.6–97.8)
MONOCYTES # BLD: 0.9 K/UL (ref 0.1–1.3)
MONOCYTES NFR BLD: 9 % (ref 4–12)
NEUTS SEG # BLD: 6.5 K/UL (ref 1.7–8.2)
NEUTS SEG NFR BLD: 69 % (ref 43–78)
NRBC # BLD: 0 K/UL (ref 0–0.2)
P-R INTERVAL, ECG05: 138 MS
PLATELET # BLD AUTO: 299 K/UL (ref 150–450)
PMV BLD AUTO: 9.3 FL (ref 9.4–12.3)
POTASSIUM SERPL-SCNC: 4.5 MMOL/L (ref 3.5–5.1)
Q-T INTERVAL, ECG07: 400 MS
QRS DURATION, ECG06: 84 MS
QTC CALCULATION (BEZET), ECG08: 440 MS
RBC # BLD AUTO: 3.62 M/UL (ref 4.23–5.6)
SERVICE CMNT-IMP: ABNORMAL
SERVICE CMNT-IMP: NORMAL
SODIUM SERPL-SCNC: 137 MMOL/L (ref 138–145)
VENTRICULAR RATE, ECG03: 73 BPM
WBC # BLD AUTO: 9.4 K/UL (ref 4.3–11.1)

## 2021-10-06 PROCEDURE — 87075 CULTR BACTERIA EXCEPT BLOOD: CPT

## 2021-10-06 PROCEDURE — 74011000258 HC RX REV CODE- 258: Performed by: FAMILY MEDICINE

## 2021-10-06 PROCEDURE — 77030019927 HC TBNG IRR CYSTO BAXT -A: Performed by: ORTHOPAEDIC SURGERY

## 2021-10-06 PROCEDURE — 85730 THROMBOPLASTIN TIME PARTIAL: CPT

## 2021-10-06 PROCEDURE — 85025 COMPLETE CBC W/AUTO DIFF WBC: CPT

## 2021-10-06 PROCEDURE — 87070 CULTURE OTHR SPECIMN AEROBIC: CPT

## 2021-10-06 PROCEDURE — 65660000000 HC RM CCU STEPDOWN

## 2021-10-06 PROCEDURE — 77030002986 HC SUT PROL J&J -A: Performed by: ORTHOPAEDIC SURGERY

## 2021-10-06 PROCEDURE — 2709999900 HC NON-CHARGEABLE SUPPLY: Performed by: ORTHOPAEDIC SURGERY

## 2021-10-06 PROCEDURE — 87186 SC STD MICRODIL/AGAR DIL: CPT

## 2021-10-06 PROCEDURE — 74011000250 HC RX REV CODE- 250: Performed by: FAMILY MEDICINE

## 2021-10-06 PROCEDURE — 28003 TREATMENT OF FOOT INFECTION: CPT | Performed by: ORTHOPAEDIC SURGERY

## 2021-10-06 PROCEDURE — 76210000063 HC OR PH I REC FIRST 0.5 HR: Performed by: ORTHOPAEDIC SURGERY

## 2021-10-06 PROCEDURE — 74011250637 HC RX REV CODE- 250/637: Performed by: ANESTHESIOLOGY

## 2021-10-06 PROCEDURE — 36415 COLL VENOUS BLD VENIPUNCTURE: CPT

## 2021-10-06 PROCEDURE — 76060000033 HC ANESTHESIA 1 TO 1.5 HR: Performed by: ORTHOPAEDIC SURGERY

## 2021-10-06 PROCEDURE — 76010010054 HC POST OP PAIN BLOCK: Performed by: ORTHOPAEDIC SURGERY

## 2021-10-06 PROCEDURE — 80048 BASIC METABOLIC PNL TOTAL CA: CPT

## 2021-10-06 PROCEDURE — 74011250636 HC RX REV CODE- 250/636: Performed by: NURSE PRACTITIONER

## 2021-10-06 PROCEDURE — 88305 TISSUE EXAM BY PATHOLOGIST: CPT

## 2021-10-06 PROCEDURE — 87077 CULTURE AEROBIC IDENTIFY: CPT

## 2021-10-06 PROCEDURE — 74011250637 HC RX REV CODE- 250/637: Performed by: FAMILY MEDICINE

## 2021-10-06 PROCEDURE — 87205 SMEAR GRAM STAIN: CPT

## 2021-10-06 PROCEDURE — 74011250636 HC RX REV CODE- 250/636: Performed by: FAMILY MEDICINE

## 2021-10-06 PROCEDURE — 77030019952 HC CANSTR VAC ASST KCON -B: Performed by: ORTHOPAEDIC SURGERY

## 2021-10-06 PROCEDURE — 20240 BONE BIOPSY OPEN SUPERFICIAL: CPT | Performed by: ORTHOPAEDIC SURGERY

## 2021-10-06 PROCEDURE — 74011250636 HC RX REV CODE- 250/636: Performed by: ANESTHESIOLOGY

## 2021-10-06 PROCEDURE — 74011250636 HC RX REV CODE- 250/636: Performed by: NURSE ANESTHETIST, CERTIFIED REGISTERED

## 2021-10-06 PROCEDURE — 0QBP0ZZ EXCISION OF LEFT METATARSAL, OPEN APPROACH: ICD-10-PCS | Performed by: ORTHOPAEDIC SURGERY

## 2021-10-06 PROCEDURE — 0QBP0ZX EXCISION OF LEFT METATARSAL, OPEN APPROACH, DIAGNOSTIC: ICD-10-PCS | Performed by: ORTHOPAEDIC SURGERY

## 2021-10-06 PROCEDURE — 77030003602 HC NDL NRV BLK BBMI -B: Performed by: ANESTHESIOLOGY

## 2021-10-06 PROCEDURE — 82962 GLUCOSE BLOOD TEST: CPT

## 2021-10-06 PROCEDURE — 77030018717 HC DRSG GRNUFM KCON -B: Performed by: ORTHOPAEDIC SURGERY

## 2021-10-06 PROCEDURE — 76010000161 HC OR TIME 1 TO 1.5 HR INTENSV-TIER 1: Performed by: ORTHOPAEDIC SURGERY

## 2021-10-06 PROCEDURE — 74011636637 HC RX REV CODE- 636/637: Performed by: FAMILY MEDICINE

## 2021-10-06 PROCEDURE — 74011000250 HC RX REV CODE- 250: Performed by: NURSE ANESTHETIST, CERTIFIED REGISTERED

## 2021-10-06 RX ORDER — INSULIN GLARGINE 100 [IU]/ML
30 INJECTION, SOLUTION SUBCUTANEOUS DAILY
Status: DISCONTINUED | OUTPATIENT
Start: 2021-10-06 | End: 2021-10-13 | Stop reason: HOSPADM

## 2021-10-06 RX ORDER — SODIUM CHLORIDE, SODIUM LACTATE, POTASSIUM CHLORIDE, CALCIUM CHLORIDE 600; 310; 30; 20 MG/100ML; MG/100ML; MG/100ML; MG/100ML
75 INJECTION, SOLUTION INTRAVENOUS CONTINUOUS
Status: DISCONTINUED | OUTPATIENT
Start: 2021-10-06 | End: 2021-10-06 | Stop reason: HOSPADM

## 2021-10-06 RX ORDER — SODIUM CHLORIDE, SODIUM LACTATE, POTASSIUM CHLORIDE, CALCIUM CHLORIDE 600; 310; 30; 20 MG/100ML; MG/100ML; MG/100ML; MG/100ML
75 INJECTION, SOLUTION INTRAVENOUS CONTINUOUS
Status: CANCELLED | OUTPATIENT
Start: 2021-10-06

## 2021-10-06 RX ORDER — PROPOFOL 10 MG/ML
INJECTION, EMULSION INTRAVENOUS AS NEEDED
Status: DISCONTINUED | OUTPATIENT
Start: 2021-10-06 | End: 2021-10-06 | Stop reason: HOSPADM

## 2021-10-06 RX ORDER — HYDROCORTISONE SODIUM SUCCINATE 100 MG/2ML
25 INJECTION, POWDER, FOR SOLUTION INTRAMUSCULAR; INTRAVENOUS ONCE
Status: COMPLETED | OUTPATIENT
Start: 2021-10-06 | End: 2021-10-06

## 2021-10-06 RX ORDER — NALOXONE HYDROCHLORIDE 0.4 MG/ML
0.2 INJECTION, SOLUTION INTRAMUSCULAR; INTRAVENOUS; SUBCUTANEOUS AS NEEDED
Status: CANCELLED | OUTPATIENT
Start: 2021-10-06

## 2021-10-06 RX ORDER — PROPOFOL 10 MG/ML
INJECTION, EMULSION INTRAVENOUS
Status: DISCONTINUED | OUTPATIENT
Start: 2021-10-06 | End: 2021-10-06 | Stop reason: HOSPADM

## 2021-10-06 RX ORDER — DEXAMETHASONE SODIUM PHOSPHATE 4 MG/ML
INJECTION, SOLUTION INTRA-ARTICULAR; INTRALESIONAL; INTRAMUSCULAR; INTRAVENOUS; SOFT TISSUE
Status: COMPLETED | OUTPATIENT
Start: 2021-10-06 | End: 2021-10-06

## 2021-10-06 RX ORDER — LIDOCAINE HYDROCHLORIDE 10 MG/ML
0.1 INJECTION INFILTRATION; PERINEURAL AS NEEDED
Status: DISCONTINUED | OUTPATIENT
Start: 2021-10-06 | End: 2021-10-06 | Stop reason: HOSPADM

## 2021-10-06 RX ORDER — LIDOCAINE HYDROCHLORIDE 20 MG/ML
INJECTION, SOLUTION EPIDURAL; INFILTRATION; INTRACAUDAL; PERINEURAL AS NEEDED
Status: DISCONTINUED | OUTPATIENT
Start: 2021-10-06 | End: 2021-10-06 | Stop reason: HOSPADM

## 2021-10-06 RX ORDER — SODIUM CHLORIDE 0.9 % (FLUSH) 0.9 %
5-40 SYRINGE (ML) INJECTION AS NEEDED
Status: CANCELLED | OUTPATIENT
Start: 2021-10-06

## 2021-10-06 RX ORDER — SODIUM CHLORIDE 0.9 % (FLUSH) 0.9 %
5-40 SYRINGE (ML) INJECTION EVERY 8 HOURS
Status: DISCONTINUED | OUTPATIENT
Start: 2021-10-06 | End: 2021-10-06 | Stop reason: HOSPADM

## 2021-10-06 RX ORDER — SODIUM CHLORIDE 0.9 % (FLUSH) 0.9 %
5-40 SYRINGE (ML) INJECTION EVERY 8 HOURS
Status: CANCELLED | OUTPATIENT
Start: 2021-10-06

## 2021-10-06 RX ORDER — SODIUM CHLORIDE 0.9 % (FLUSH) 0.9 %
5-40 SYRINGE (ML) INJECTION AS NEEDED
Status: DISCONTINUED | OUTPATIENT
Start: 2021-10-06 | End: 2021-10-06 | Stop reason: HOSPADM

## 2021-10-06 RX ORDER — ACETAMINOPHEN 500 MG
1000 TABLET ORAL ONCE
Status: COMPLETED | OUTPATIENT
Start: 2021-10-06 | End: 2021-10-06

## 2021-10-06 RX ORDER — MIDAZOLAM HYDROCHLORIDE 1 MG/ML
2 INJECTION, SOLUTION INTRAMUSCULAR; INTRAVENOUS
Status: COMPLETED | OUTPATIENT
Start: 2021-10-06 | End: 2021-10-06

## 2021-10-06 RX ADMIN — MIDAZOLAM 2 MG: 1 INJECTION INTRAMUSCULAR; INTRAVENOUS at 14:01

## 2021-10-06 RX ADMIN — EPINEPHRINE 25 ML: 1 INJECTION, SOLUTION, CONCENTRATE INTRAVENOUS at 14:05

## 2021-10-06 RX ADMIN — TRAZODONE HYDROCHLORIDE 75 MG: 50 TABLET ORAL at 21:13

## 2021-10-06 RX ADMIN — HYDROCORTISONE SODIUM SUCCINATE 25 MG: 100 INJECTION, POWDER, FOR SOLUTION INTRAMUSCULAR; INTRAVENOUS at 14:10

## 2021-10-06 RX ADMIN — EZETIMIBE 10 MG: 10 TABLET ORAL at 08:49

## 2021-10-06 RX ADMIN — LOSARTAN POTASSIUM 100 MG: 50 TABLET, FILM COATED ORAL at 08:49

## 2021-10-06 RX ADMIN — SODIUM CHLORIDE 100 ML/HR: 900 INJECTION, SOLUTION INTRAVENOUS at 19:44

## 2021-10-06 RX ADMIN — PREDNISONE 5 MG: 10 TABLET ORAL at 08:49

## 2021-10-06 RX ADMIN — PROPOFOL 50 MG: 10 INJECTION, EMULSION INTRAVENOUS at 14:26

## 2021-10-06 RX ADMIN — LIDOCAINE HYDROCHLORIDE 100 MG: 20 INJECTION, SOLUTION EPIDURAL; INFILTRATION; INTRACAUDAL; PERINEURAL at 14:26

## 2021-10-06 RX ADMIN — AMLODIPINE BESYLATE 5 MG: 2.5 TABLET ORAL at 08:49

## 2021-10-06 RX ADMIN — INSULIN LISPRO 15 UNITS: 100 INJECTION, SOLUTION INTRAVENOUS; SUBCUTANEOUS at 21:13

## 2021-10-06 RX ADMIN — SOTALOL HYDROCHLORIDE 40 MG: 80 TABLET ORAL at 09:00

## 2021-10-06 RX ADMIN — PREDNISONE 5 MG: 10 TABLET ORAL at 17:25

## 2021-10-06 RX ADMIN — CEFEPIME HYDROCHLORIDE 1 G: 1 INJECTION, POWDER, FOR SOLUTION INTRAMUSCULAR; INTRAVENOUS at 05:19

## 2021-10-06 RX ADMIN — EPINEPHRINE 15 ML: 1 INJECTION, SOLUTION, CONCENTRATE INTRAVENOUS at 14:07

## 2021-10-06 RX ADMIN — SODIUM CHLORIDE, SODIUM LACTATE, POTASSIUM CHLORIDE, AND CALCIUM CHLORIDE 75 ML/HR: 600; 310; 30; 20 INJECTION, SOLUTION INTRAVENOUS at 13:17

## 2021-10-06 RX ADMIN — DEXAMETHASONE SODIUM PHOSPHATE 4 MG: 4 INJECTION, SOLUTION INTRAMUSCULAR; INTRAVENOUS at 14:05

## 2021-10-06 RX ADMIN — VANCOMYCIN HYDROCHLORIDE 1500 MG: 10 INJECTION, POWDER, LYOPHILIZED, FOR SOLUTION INTRAVENOUS at 14:11

## 2021-10-06 RX ADMIN — DEXTROSE MONOHYDRATE 12.5 G: 25 INJECTION, SOLUTION INTRAVENOUS at 08:52

## 2021-10-06 RX ADMIN — Medication 10 ML: at 21:14

## 2021-10-06 RX ADMIN — FAMOTIDINE 20 MG: 20 TABLET ORAL at 17:26

## 2021-10-06 RX ADMIN — SODIUM CHLORIDE 100 ML/HR: 900 INJECTION, SOLUTION INTRAVENOUS at 10:00

## 2021-10-06 RX ADMIN — FLUTICASONE PROPIONATE 2 SPRAY: 50 SPRAY, METERED NASAL at 08:56

## 2021-10-06 RX ADMIN — PROPOFOL 70 MCG/KG/MIN: 10 INJECTION, EMULSION INTRAVENOUS at 14:26

## 2021-10-06 RX ADMIN — Medication 10 ML: at 05:19

## 2021-10-06 RX ADMIN — ACETAMINOPHEN 1000 MG: 500 TABLET ORAL at 13:15

## 2021-10-06 RX ADMIN — CEFEPIME HYDROCHLORIDE 1 G: 1 INJECTION, POWDER, FOR SOLUTION INTRAMUSCULAR; INTRAVENOUS at 17:50

## 2021-10-06 RX ADMIN — SOTALOL HYDROCHLORIDE 40 MG: 80 TABLET ORAL at 17:25

## 2021-10-06 RX ADMIN — FAMOTIDINE 20 MG: 20 TABLET ORAL at 08:49

## 2021-10-06 NOTE — PROGRESS NOTES
Pt A&Ox4. Pt is now on carb cont diet. Wound vac in place to Left foot at 125mmg suction. Elevated and is to have no pressure put on it. Pt educated. AChS sugr have been low, gave D% 12.5 inj this AM for 69 and was symptomatic. Last sugar was 133. [email or web address]@[email or web address] infusing and vanco+cefepime given.  Will continue to monitor and reassess

## 2021-10-06 NOTE — PROGRESS NOTES
[facility]   Progress Note    Patient: Evin Cordero MRN: [id number]  SSN: [id number]    YOB: 1945  Age: 77 y.o. Sex: male      Admit Date: 10/5/2021    LOS: 1 day     Subjective:     Resting overnight. No acute events. Objective:     Vitals:    10/06/21 0000 10/06/21 0400 10/06/21 0430 10/06/21 0715   BP:   (!) 154/64 (!) 152/72   Pulse: 69 72 70 70   Resp:   20 20   Temp:   98.6 °F (37 °C) 98.1 °F (36.7 °C)   SpO2:   97% 98%   Weight:       Height:            Intake and Output:  Current Shift: 10/06 0701 - 10/06 1900  In: -   Out: 200 [Urine:200]  Last three shifts: 10/04 1901 - 10/06 0700  In: 480 [P.O.:480]  Out: 800 [Urine:800]    Physical Exam:   Left foot. Plantar ulcer remains the same as yesterday. Mild drainage over the plantar ulcer underneath his second toe. The dorsal medial foot wound appears the same. A lot of redness erythema swelling. It extends around to the level of the TMT joint but is centered around the first MTP joint. No signs of increased draining or pus. No signs of spreading up the leg or up the ankle. Lab/Data Review:  CBC:   Lab Results   Component Value Date/Time    WBC 9.4 10/06/2021 04:37 AM    HGB 11.0 (L) 10/06/2021 04:37 AM    HCT 34.1 (L) 10/06/2021 04:37 AM     10/06/2021 04:37 AM          Assessment:     Principal Problem:    Abscess of left foot (10/5/2021)    Active Problems:    DM type 2 (diabetes mellitus, type 2) ([facility] (10/5/2021)      HTN (hypertension) (10/5/2021)      Sleep apnea (10/5/2021)      COPD (chronic obstructive pulmonary disease) ([facility] (10/5/2021)      Polymyalgia rheumatica ([facility] (10/5/2021)      Hyperlipidemia (10/5/2021)      Diabetic neuropathy associated with type 2 diabetes mellitus ([facility] (10/5/2021)      Class 1 obesity in adult (10/5/2021)      CAD (coronary artery disease) (10/5/2021)      H/O cardiac pacemaker (10/5/2021)        Plan:      We will go the operating room today.  I discussed with him that it is okay to operate through his Xarelto. I think that delaying this procedure for Xarelto will be detrimental to his health. I think that source control and infection control is more important than Xarelto control. I discussed debridement today and attempted foot salvage. Also discussed potential multiple surgeries. We will proceed to the operating room today. He agreed to have an irrigation debridement of his foot, Achilles tendon lengthening, potential multiple surgeries. We will go the operating room today. I was very rc with him that the surgery is not elective and that this is a salvage situation. I cannot promise him that he will keep his foot and he may end up with a transmetatarsal amputation.   I discussed bleeding associate with Xarelto, but also discussed how delaying surgery it feels inappropriate due to x-ray findings and clinical exam.  Signed By: Pratik Melo MD     October 6, 2021

## 2021-10-06 NOTE — ANESTHESIA PROCEDURE NOTES
Peripheral Block    Start time: 10/6/2021 2:01 PM  End time: 10/6/2021 2:05 PM  Performed by: Norma Holder MD  Authorized by: Norma Holder MD       Pre-procedure: Indications: at surgeon's request and post-op pain management    Preanesthetic Checklist: patient identified, risks and benefits discussed, site marked, timeout performed, anesthesia consent given and patient being monitored    Timeout Time: 14:01 EDT          Block Type:   Block Type:  Popliteal  Laterality:  Left  Monitoring:  Responsive to questions, continuous pulse ox, oxygen, frequent vital sign checks and heart rate  Injection Technique:  Single shot  Procedures: ultrasound guided    Patient Position: right lateral decubitus  Prep: chlorhexidine    Location:  Lower thigh  Needle Type:  Stimuplex  Needle Gauge:  20 G  Needle Localization:  Ultrasound guidance  Medication Injected:  Mepivacaine 1.5% with epinephrine 1:200,000 injection, 25 mL (Mixture components: EPINEPHrine HCl (PF) 1 mg/mL (1 mL) Soln, . 005 mL; mepivacaine-pf 15 mg/mL (1.5 %) Soln, 1 mL)  dexamethasone (DECADRON) 4 mg/mL injection, 4 mg  Med Admin Time: 10/6/2021 2:05 PM    Assessment:  Number of attempts:  1  Injection Assessment:  Incremental injection every 5 mL, negative aspiration for CSF, no paresthesia, ultrasound image on chart, no intravascular symptoms, negative aspiration for blood and local visualized surrounding nerve on ultrasound  Patient tolerance:  Patient tolerated the procedure well with no immediate complications

## 2021-10-06 NOTE — PERIOP NOTES
TRANSFER - OUT REPORT:    Verbal report given to Ha Treviño on Meena Prater  being transferred to [phone] for routine post - op       Report consisted of patients Situation, Background, Assessment and   Recommendations(SBAR). Information from the following report(s) OR Summary, Procedure Summary, Intake/Output and MAR was reviewed with the receiving nurse. Lines:   Peripheral IV 10/05/21 Left Antecubital (Active)   Site Assessment Clean, dry, & intact 10/06/21 1526   Phlebitis Assessment 0 10/06/21 1526   Infiltration Assessment 0 10/06/21 1526   Dressing Status Clean, dry, & intact 10/06/21 1526   Dressing Type Tape;Transparent 10/06/21 1526   Hub Color/Line Status Patent 10/06/21 1526   Alcohol Cap Used No 10/06/21 1526       Peripheral IV 10/05/21 Right Antecubital (Active)   Site Assessment Clean, dry, & intact 10/06/21 1526   Phlebitis Assessment 0 10/06/21 1526   Infiltration Assessment 0 10/06/21 1526   Dressing Status Clean, dry, & intact 10/06/21 1526   Dressing Type Tape;Transparent 10/06/21 1526   Hub Color/Line Status Patent 10/06/21 1526   Alcohol Cap Used No 10/06/21 1526        Opportunity for questions and clarification was provided. Patient transported with:      VTE prophylaxis orders have not been written for Meena Prater. Patient and family given floor number and nurses name. Family updated re: pt status after security code verified.

## 2021-10-06 NOTE — PROGRESS NOTES
Hospitalist Progress Note   Admit Date:  10/5/2021  4:54 PM   Name:  Tosha Becerra   Age:  77 y.o. Sex:  male  :  1945   MRN:  [id number]   Room:  [place] [address]    Presenting Complaint: Foot Pain    Reason(s) for Admission: Abscess of left foot [facility] Course & Interval History:   Wiley Sam is a 77year old male with medical history of chronic left foot diabetic ulcer, polymyalgia rheumatica on prednisone, A. fib on sotalol, s/p pacemaker, COPD, diabetes mellitus type 2 on Tresiba, hypertension, Afib, obesity  Presented to the ER with a complaint of swelling, erythema, and drainage left foot. Had previously seen Dr. Dee Dee Bruno who directed patient to ER for surgical intervention     Subjective (10/06/21):  Denies foot pain, N/V or chills    Assessment & Plan:     Principal Problem:  Abscess left foot region with chronic diabetic foot ulcer  Continue broad-spectrum antibiotics with cefepime and vancomycin  10/6/21 Patient going for I&D today; prn analgesia and wound care; wound Cx growing GNR; BCx NGTD      Diabetes mellitus type 2/diabetic neuropathy bilateral feet  Patient on Tresiba at home  Presently here on sliding scale and Lantus  10/6/21 Holding Lantus today; will restart at lower dose tomorrow; BGLs ranging   And patient is NPO currently; Continue AC/HS accu checks with SSI     History of A. Fib/pacemaker  On sotalol and Xarelto  Reviewed the dosage of sotalol. Patient took Xarelto today, need to start on heparin drip from tomorrow probably no bolus.   10/6/21 Holding Xarelto with restart per Ortho recs; telemetry     Hyperlipidemia  Continue Zetia     GERD  Continue famotidine     Hypertension  Continue amlodipine and losartan     Sleep apnea  Continue CPAP     Polymyalgia rheumatica  Continue on prednisone  Needs Solu-Cortef prior to surgery  10/6/21 Stress dosing Solu Cortef      COPD  As needed nebs        Dispo/Discharge Planning:  Obesity with a BMI of 33.12         Dispo pending     Diet:  DIET NPO Sips of Water with Meds  DVT PPx: SCDs  Code status: Full Code    Hospital Problems as of 10/6/2021 Date Reviewed: 10/5/2021        Codes Class Noted - Resolved POA    * (Principal) Abscess of left foot ICD-10-CM: X48.926  ICD-9-CM: 682.7  10/5/2021 - Present Unknown        DM type 2 (diabetes mellitus, type 2) ([facility] ICD-10-CM: E11.9  ICD-9-CM: 250.00  10/5/2021 - Present Unknown        HTN (hypertension) ICD-10-CM: I10  ICD-9-CM: 401.9  10/5/2021 - Present Unknown        Sleep apnea ICD-10-CM: G47.30  ICD-9-CM: 780.57  10/5/2021 - Present Unknown        COPD (chronic obstructive pulmonary disease) ([place] [address].) ICD-10-CM: J44.9  ICD-9-CM: 683  10/5/2021 - Present Unknown        Polymyalgia rheumatica ([facility] ICD-10-CM: M35.3  ICD-9-CM: 551  10/5/2021 - Present Unknown        Hyperlipidemia ICD-10-CM: E78.5  ICD-9-CM: 272.4  10/5/2021 - Present Unknown        Diabetic neuropathy associated with type 2 diabetes mellitus ([facility] ICD-10-CM: E11.40  ICD-9-CM: 250.60, 357.2  10/5/2021 - Present Unknown        Class 1 obesity in adult ICD-10-CM: E66.9  ICD-9-CM: 278.00  10/5/2021 - Present Unknown        CAD (coronary artery disease) ICD-10-CM: I25.10  ICD-9-CM: 414.00  10/5/2021 - Present Unknown        H/O cardiac pacemaker ICD-10-CM: Z95.0  ICD-9-CM: V12.50, V45.01  10/5/2021 - Present Unknown              Objective:     Patient Vitals for the past 24 hrs:   Temp Pulse Resp BP SpO2   10/06/21 0715 98.1 °F (36.7 °C) 70 20 (!) 152/72 98 %   10/06/21 0430 98.6 °F (37 °C) 70 20 (!) 154/64 97 %   10/06/21 0400 -- 72 -- -- --   10/06/21 0000 -- 69 -- -- --   10/05/21 2354 98.6 °F (37 °C) 70 20 (!) 157/58 96 %   10/05/21 2000 -- 74 -- -- --   10/05/21 1930 98.7 °F (37.1 °C) 72 22 (!) 168/88 97 %   10/05/21 1639 (!) 101.4 °F (38.6 °C) 74 28 137/62 97 %     Oxygen Therapy  O2 Sat (%): 98 % (10/06/21 0715)  O2 Device: CPAP mask (10/06/21 0430)  Skin Assessment: Clean, dry, & intact (10/05/21 5629)    Estimated body mass index is 33.12 kg/m² as calculated from the following:    Height as of this encounter: 6' 3\" (1.905 m). Weight as of this encounter: 120.2 kg (265 lb). Intake/Output Summary (Last 24 hours) at 10/6/2021 1157  Last data filed at 10/6/2021 0715  Gross per 24 hour   Intake 480 ml   Output 1000 ml   Net -520 ml         Physical Exam:     General:    Well nourished. No overt distress  Head:  Normocephalic, atraumatic  Eyes:  Sclerae appear normal.  Pupils equally round. ENT:  Nares appear normal, no drainage. Moist oral mucosa  Neck:  No restricted ROM. Trachea midline   CV:   RRR. No m/r/g. No jugular venous distension. Lungs:   CTAB. No wheezing, rhonchi, or rales. Respirations even, unlabored  Abdomen: Bowel sounds present. Soft, nontender, nondistended. Extremities: No cyanosis or clubbing. No edema; Left foot wound (see media)   Skin:     No rashes and normal coloration. Warm and dry. Neuro:  Cranial nerves II-XII grossly intact. A&Ox3  Psych:  Normal mood and affect.       I have reviewed ordered lab tests and independently visualized imaging below:    Last 24hr Labs:  Recent Results (from the past 24 hour(s))   CULTURE, BLOOD    Collection Time: 10/05/21  4:52 PM    Specimen: Blood   Result Value Ref Range    Special Requests: NO SPECIAL REQUESTS  LEFT  Antecubital        Culture result: NO GROWTH AFTER 14 HOURS     LACTIC ACID    Collection Time: 10/05/21  4:52 PM   Result Value Ref Range    Lactic acid 1.1 0.4 - 2.0 MMOL/L   CBC WITH AUTOMATED DIFF    Collection Time: 10/05/21  4:52 PM   Result Value Ref Range    WBC 11.1 4.3 - 11.1 K/uL    RBC 4.33 4.23 - 5.6 M/uL    HGB 13.1 (L) 13.6 - 17.2 g/dL    HCT 40.3 (L) 41.1 - 50.3 %    MCV 93.1 79.6 - 97.8 FL    MCH 30.3 26.1 - 32.9 PG    MCHC 32.5 31.4 - 35.0 g/dL    RDW 12.4 11.9 - 14.6 %    PLATELET 861 932 - 178 K/uL    MPV 9.3 (L) 9.4 - 12.3 FL    ABSOLUTE NRBC 0.00 0.0 - 0.2 K/uL    DF AUTOMATED NEUTROPHILS 73 43 - 78 %    LYMPHOCYTES 16 13 - 44 %    MONOCYTES 8 4.0 - 12.0 %    EOSINOPHILS 2 0.5 - 7.8 %    BASOPHILS 0 0.0 - 2.0 %    IMMATURE GRANULOCYTES 1 0.0 - 5.0 %    ABS. NEUTROPHILS 8.2 1.7 - 8.2 K/UL    ABS. LYMPHOCYTES 1.8 0.5 - 4.6 K/UL    ABS. MONOCYTES 0.9 0.1 - 1.3 K/UL    ABS. EOSINOPHILS 0.3 0.0 - 0.8 K/UL    ABS. BASOPHILS 0.0 0.0 - 0.2 K/UL    ABS. IMM. GRANS. 0.1 0.0 - 0.5 K/UL   METABOLIC PANEL, COMPREHENSIVE    Collection Time: 10/05/21  4:52 PM   Result Value Ref Range    Sodium 134 (L) 138 - 145 mmol/L    Potassium 4.4 3.5 - 5.1 mmol/L    Chloride 99 98 - 107 mmol/L    CO2 28 21 - 32 mmol/L    Anion gap 7 7 - 16 mmol/L    Glucose 118 (H) 65 - 100 mg/dL    BUN 21 8 - 23 MG/DL    Creatinine 1.38 0.8 - 1.5 MG/DL    GFR est AA >60 >60 ml/min/1.73m2    GFR est non-AA 53 (L) >60 ml/min/1.73m2    Calcium 9.0 8.3 - 10.4 MG/DL    Bilirubin, total 1.2 (H) 0.2 - 1.1 MG/DL    ALT (SGPT) 135 (H) 12 - 65 U/L    AST (SGOT) 96 (H) 15 - 37 U/L    Alk. phosphatase 109 50 - 136 U/L    Protein, total 8.3 (H) 6.3 - 8.2 g/dL    Albumin 2.7 (L) 3.2 - 4.6 g/dL    Globulin 5.6 (H) 2.3 - 3.5 g/dL    A-G Ratio 0.5 (L) 1.2 - 3.5     HEMOGLOBIN A1C WITH EAG    Collection Time: 10/05/21  4:52 PM   Result Value Ref Range    Hemoglobin A1c 7.9 (H) 4.2 - 6.3 %    Est. average glucose 180 mg/dL   EKG, 12 LEAD, INITIAL    Collection Time: 10/05/21  4:58 PM   Result Value Ref Range    Ventricular Rate 73 BPM    Atrial Rate 73 BPM    P-R Interval 138 ms    QRS Duration 84 ms    Q-T Interval 400 ms    QTC Calculation (Bezet) 440 ms    Calculated P Axis 74 degrees    Calculated R Axis 69 degrees    Calculated T Axis 70 degrees    Diagnosis       Normal sinus rhythm  Normal ECG  No previous ECGs available  Confirmed by Leena Sexton MD (), Matt White ([id number]) on 10/6/2021 6:32:25 AM     CULTURE, WOUND [place] Karen STAIN    Collection Time: 10/05/21  5:38 PM    Specimen: Foot;  Wound    LEFT   Result Value Ref Range    Special Requests: LEFT FOOT      GRAM STAIN 0 TO 6 WBC'S/OIF     GRAM STAIN FEW GRAM NEGATIVE RODS      Culture result: LIGHT GRAM NEGATIVE RODS SUBCULTURE IN PROGRESS (A)     CULTURE, BLOOD    Collection Time: 10/05/21  5:54 PM    Specimen: Blood   Result Value Ref Range    Special Requests: NO SPECIAL REQUESTS  RIGHT  Antecubital        Culture result: NO GROWTH AFTER 13 HOURS     GLUCOSE, POC    Collection Time: 10/05/21  8:44 PM   Result Value Ref Range    Glucose (POC) 206 (H) 65 - 100 mg/dL    Performed by Sarah    LACTIC ACID    Collection Time: 10/05/21 10:34 PM   Result Value Ref Range    Lactic acid 0.9 0.4 - 2.0 MMOL/L   METABOLIC PANEL, BASIC    Collection Time: 10/06/21  4:37 AM   Result Value Ref Range    Sodium 137 (L) 138 - 145 mmol/L    Potassium 4.5 3.5 - 5.1 mmol/L    Chloride 103 98 - 107 mmol/L    CO2 27 21 - 32 mmol/L    Anion gap 7 7 - 16 mmol/L    Glucose 81 65 - 100 mg/dL    BUN 20 8 - 23 MG/DL    Creatinine 1.30 0.8 - 1.5 MG/DL    GFR est AA >60 >60 ml/min/1.73m2    GFR est non-AA 57 (L) >60 ml/min/1.73m2    Calcium 8.7 8.3 - 10.4 MG/DL   CBC WITH AUTOMATED DIFF    Collection Time: 10/06/21  4:37 AM   Result Value Ref Range    WBC 9.4 4.3 - 11.1 K/uL    RBC 3.62 (L) 4.23 - 5.6 M/uL    HGB 11.0 (L) 13.6 - 17.2 g/dL    HCT 34.1 (L) 41.1 - 50.3 %    MCV 94.2 79.6 - 97.8 FL    MCH 30.4 26.1 - 32.9 PG    MCHC 32.3 31.4 - 35.0 g/dL    RDW 12.2 11.9 - 14.6 %    PLATELET 700 537 - 261 K/uL    MPV 9.3 (L) 9.4 - 12.3 FL    ABSOLUTE NRBC 0.00 0.0 - 0.2 K/uL    DF AUTOMATED      NEUTROPHILS 69 43 - 78 %    LYMPHOCYTES 19 13 - 44 %    MONOCYTES 9 4.0 - 12.0 %    EOSINOPHILS 2 0.5 - 7.8 %    BASOPHILS 1 0.0 - 2.0 %    IMMATURE GRANULOCYTES 1 0.0 - 5.0 %    ABS. NEUTROPHILS 6.5 1.7 - 8.2 K/UL    ABS. LYMPHOCYTES 1.8 0.5 - 4.6 K/UL    ABS. MONOCYTES 0.9 0.1 - 1.3 K/UL    ABS. EOSINOPHILS 0.2 0.0 - 0.8 K/UL    ABS. BASOPHILS 0.1 0.0 - 0.2 K/UL    ABS. IMM.  GRANS. 0.1 0.0 - 0.5 K/UL   PTT    Collection Time: 10/06/21 4: 37 AM   Result Value Ref Range    aPTT 38.7 (H) 24.1 - 35.1 SEC   GLUCOSE, POC    Collection Time: 10/06/21  7:31 AM   Result Value Ref Range    Glucose (POC) 69 65 - 100 mg/dL    Performed by Abhijeet Peña, POC    Collection Time: 10/06/21  9:15 AM   Result Value Ref Range    Glucose (POC) 121 (H) 65 - 100 mg/dL    Performed by [facility]        All Micro Results     Procedure Component Value Units Date/Time    CULTURE, oTm Tsang STAIN [[id number]]  (Abnormal) Collected: 10/05/21 1738    Order Status: Completed Specimen: Wound from Foot Updated: 10/06/21 1146     Special Requests: LEFT FOOT        GRAM STAIN 0 TO 6 WBC'S/OIF      FEW GRAM NEGATIVE RODS        Culture result:       LIGHT GRAM NEGATIVE RODS SUBCULTURE IN PROGRESS          BLOOD CULTURE [[id number]] Collected: 10/05/21 1652    Order Status: Completed Specimen: Blood Updated: 10/06/21 0734     Special Requests: --        NO SPECIAL REQUESTS  LEFT  Antecubital       Culture result: NO GROWTH AFTER 14 HOURS       BLOOD CULTURE [[id number]] Collected: 10/05/21 1754    Order Status: Completed Specimen: Blood Updated: 10/06/21 0712     Special Requests: --        NO SPECIAL REQUESTS  RIGHT  Antecubital       Culture result: NO GROWTH AFTER 13 HOURS             Other Studies:  XR ANKLE LT MIN 3 V    Result Date: 10/5/2021  AUTOMATIC ADMINISTRATIVE RESULT The result for this exam can be found in the Progress note in the chart.     : See Progress note in the chart. XR FOOT LT AP/LAT    Result Date: 10/5/2021  AUTOMATIC ADMINISTRATIVE RESULT The result for this exam can be found in the Progress note in the chart.     : See Progress note in the chart.     XR CHEST PORT    Result Date: 10/5/2021  Chest portable CLINICAL INDICATION: Acute tachypnea and shortness of breath, history of cardiac stents and prostate cancer, hypertension, pacemaker, coronary artery disease, bradycardia, atrial fibrillation, COPD, sleep apnea, diabetes type 2, GERD and asbestos exposure COMPARISON: No prior thoracic imaging. Correlation with lumbar spine CT 6/7/2021 TECHNIQUE: single AP portable view chest at 5:08 PM upright FINDINGS: Lungs are well-inflated. Left side pacemaker is noted with 2 leads, obscuring portions of the left lung. There are coronary artery stents. There is no evidence of consolidation, pneumothorax, pleural effusion or pulmonary edema. The mediastinal and hilar contours are normal given technique. No acute disease.        Current Meds:  Current Facility-Administered Medications   Medication Dose Route Frequency    insulin glargine (LANTUS) injection 30 Units  30 Units SubCUTAneous DAILY    sodium chloride (NS) flush 5-10 mL  5-10 mL IntraVENous PRN    amLODIPine (NORVASC) tablet 5 mg  5 mg Oral DAILY    ezetimibe (ZETIA) tablet 10 mg  10 mg Oral DAILY    famotidine (PEPCID) tablet 20 mg  20 mg Oral BID    fluticasone propionate (FLONASE) 50 mcg/actuation nasal spray 2 Spray  2 Spray Both Nostrils DAILY    nitroglycerin (NITROSTAT) tablet 0.4 mg  0.4 mg SubLINGual PRN    predniSONE (DELTASONE) tablet 5 mg  5 mg Oral BID WITH MEALS    sotaloL (BETAPACE) tablet 40 mg  40 mg Oral BID    sodium chloride (NS) flush 5-40 mL  5-40 mL IntraVENous Q8H    sodium chloride (NS) flush 5-40 mL  5-40 mL IntraVENous PRN    acetaminophen (TYLENOL) tablet 650 mg  650 mg Oral Q6H PRN    Or    acetaminophen (TYLENOL) suppository 650 mg  650 mg Rectal Q6H PRN    polyethylene glycol (MIRALAX) packet 17 g  17 g Oral DAILY PRN    ondansetron (ZOFRAN ODT) tablet 4 mg  4 mg Oral Q8H PRN    Or    ondansetron (ZOFRAN) injection 4 mg  4 mg IntraVENous Q6H PRN    traZODone (DESYREL) tablet 75 mg  75 mg Oral QHS    losartan (COZAAR) tablet 100 mg  100 mg Oral DAILY    cefepime (MAXIPIME) 1 g in 0.9% sodium chloride (MBP/ADV) 50 mL MBP  1 g IntraVENous Q12H    0.9% sodium chloride infusion  100 mL/hr IntraVENous CONTINUOUS    dextrose 40% (GLUTOSE) oral gel 1 Tube  15 g Oral PRN    glucagon (GLUCAGEN) injection 1 mg  1 mg IntraMUSCular PRN    dextrose (D50W) injection syrg 12.5-25 g  25-50 mL IntraVENous PRN    insulin lispro (HUMALOG) injection   SubCUTAneous AC&HS    vancomycin (VANCOCIN) 1500 mg in  ml infusion  1,500 mg IntraVENous Q24H       Signed:  Ingris Wilkerson NP    Part of this note may have been written by using a voice dictation software. The note has been proof read but may still contain some grammatical/other typographical errors.

## 2021-10-06 NOTE — OP NOTES
Operative Note    [address]  MRN: [id number]    Date Of Surgery: 10/6/2021    Surgeon: King Porter MD    Assistant Surgeon: None    Procedure Performed:   Left  Irrigation debridement left foot abscess multiple compartments  Partial excision first metatarsal head with bone biopsy  Debridement of chronic wound plantar foot  Application of negative pressure wound therapy    This was the first stage of a planned two-stage operation. Pre Op Diagnosis:  Left foot abscess  Left chronic foot wounds    Post Op Diagnosis:   same    Implants:   * No implants in log *    Anesthesia:  MAC    Blood Loss:  5    Tourniquet:  Estimated 0 minutes    Complications:  None    Pre Operative Abx:   Patient on preoperative scheduled IV antibiotics    Specimens/Cultures:  Bone biopsy sent the metatarsal head  Multiple cultures taken from pus of foot    Significant Findings:  Abscess noted over the medial eminence of the foot. It had continuity with the chronic plantar foot wound. Multiple incisions were needed to irrigate debride this wound. The plantar foot wound itself had exposed 1st metatarsal head plantarly. I removed this exposed bone and placed a wound VAC over it. It measured approximately 4 cm x 2 cm x 1 cm deep. I suspect that his chronic plantar ulcer had exposed first metatarsal head bone, which became seeded with bacteria, which then spurred osteomyelitis and circumferential first ray infection. Pre Operative Course:  Heddy Sacks is a 77 y.o. male who has a chronic plantar foot wound that admitted her wound care and antibiotics. This past week he start developing redness erythema over his first MTP joint dorsally. He went to see my partner in the office who recognized an abscess. He was then admitted to the hospital for IV antibiotics. I was asked to evaluate. I felt an I&D was needed to control the infection.   I discussed with the patient this would be most likely a two-stage operation. Operation In Detail:  Patient was evaluated in the preoperative area. The left lower extremity was marked by me. We had a long discussion about the procedure and postoperative protocols. The patient was then brought back to the operating room suite and placed in the operating room table. A timeout was taken to identify the patient, procedure being performed, and laterality. After this the patient was prepped and draped in the normal sterile fashion using a Betadine solution and/or a ChloraPrep solution. A timeout was then taken to identify the patient his name, date of birth, laterality, and procedure being performed. We also identified allergies and any concerns about the operation. Attention was then placed to the operative extremity. First made a dorsal incision over the dorsal MTP joint of the first ray. As I excised the skin pus immediately came out. I cultured this pus. This pus was subcutaneous but did have continuity with the first MTP joint. The abscess started dorsally and wrapped around underneath the first MTP joint. I took a second culture from the joint itself. I debrided all necrotic tissue on the dorsal medial aspect of the wound with a blade, 15 blade, knife, and Ray-Taye. After debrided all of the necrotic tissue off the dorsal foot I worked around to the plantar medial foot. I then made a second separate incision on the plantar medial aspect of the first MTP joint, adjacent to the chronic ulcer. Pus was noted to be still present at this level. I removed the pus and irrigated out the wound. Abraded all dead necrotic tissue. He had a chronic wound underneath his second MTP joint, and this ulcer plantarly was in connection with his abscess. I was able to run fluid through the dorsal incision, the plantar medial incision, and through the the chronic ulcer to irrigate out the wound.   After copious irrigation and debridement of the dorsal medial and plantar medial foot wounds I thought I had removed all pus, irrigated out the joint, and removed all necrotic soft tissue. I then paid attention to the chronic plantar wound. I took a 15 blade knife and debrided off the callused rim. I measured this wound to be approximately 4 cm x 2 cm x 1 cm. After debridement of the skin edges I took a 15 blade knife and remove the exudate that had grown over the wound itself. On the plantar medial aspect of this callus there was exposed metatarsal head, first metatarsal head, at the base of this wound. I took a rongeur and resected the plantar aspect of the first metatarsal head. This bone was then sent off for biopsy. The lateral aspect of the wound had soft tissue and no exposed bone. I irrigated out this wound copiously. I then irrigated out all 3 incisions. I made sure I removed all necrotic tissue that could visualize, and I closed the first dorsal medial incision I made with 3-0 Prolene sutures. Multiple interrupted sutures were thrown. I then worked plantarly. To close the plantar wounds I placed a wound VAC on top of them. I felt this would create a good seal to remove any pus accumulation or reaccumulation of infection. I placed the appropriate size sponge directly onto the wound on the plantar foot. It measured 4 cm x 2 cm x 1 cm. I then placed the adhesive dressing around the sponge. I cut a hole in the sponge and hooked it up to the wound VAC machine. I got a good seal.  I then reinforced the dressings around the foot. A sterile dressing was then applied to the leg and soft dressing. They were awoken from anesthesia and returned to the PACU without difficulty. Post Operative Plan:   1- WB status: Non-Weight Bearing   2-continue heparin, I do not wish for him to be on Xarelto. 3-plan for return trip to the operating room in approximately 2 to 3 days for repeat I&D and potential partial amputation of the foot.   It is my medical opinion based that the first metatarsal head is infected. He already has a cock-up toe and a toe deformity on this side due to multiple toe surgeries. I think the best way to control the infection is an amputation of the of the first metatarsal head and the toe. However he still has a large plantar wound I think will require healing. I think he would also benefit from second and third metatarsal neck osteotomies. By performing the osteotomies of the metatarsal neck and partial amputation of the medial ray I think he has a best possible chance to save some of his foot. However I am not sure this foot is salvageable. He may ultimately need a transmetatarsal amputation. I will discuss this with him when he awakens from anesthesia.

## 2021-10-06 NOTE — PROGRESS NOTES
Orthopedic Plan of Care    1. NPO at midnight. 2.  Plan for I&D Left foot tomorrow with [facility]. Based on intraoperative findings, he may need multiple I&D surgeries. 3. Hold Xarelto and switch to lovenox if [id number] with Medicine team.  However we will not delay surgery if there is an issue with anticoagulation.     4. Continue broad spectrum IV abx.  5. Medicine team to manage Afib, DM, CAD, COPD, HTN

## 2021-10-06 NOTE — ANESTHESIA PROCEDURE NOTES
Peripheral Block    Start time: 10/6/2021 2:06 PM  End time: 10/6/2021 2:07 PM  Performed by: Andres Fernandez MD  Authorized by: Andres Fernandez MD       Pre-procedure: Indications: at surgeon's request and post-op pain management    Preanesthetic Checklist: patient identified, risks and benefits discussed, site marked, timeout performed, anesthesia consent given and patient being monitored    Timeout Time: 14:06 EDT          Block Type:   Block Type: Adductor canal  Laterality:  Left  Monitoring:  Responsive to questions, continuous pulse ox, oxygen, frequent vital sign checks and heart rate  Injection Technique:  Single shot  Procedures: ultrasound guided    Patient Position: supine  Prep: chlorhexidine    Location:  Upper thigh  Needle Type:  Stimuplex  Needle Gauge:  20 G  Needle Localization:  Ultrasound guidance  Medication Injected:  Mepivacaine 1.5% with epinephrine 1:200,000 injection, 15 mL (Mixture components: EPINEPHrine HCl (PF) 1 mg/mL (1 mL) Soln, . 005 mL; mepivacaine-pf 15 mg/mL (1.5 %) Soln, 1 mL)  Med Admin Time: 10/6/2021 2:07 PM    Assessment:  Number of attempts:  1  Injection Assessment:  Incremental injection every 5 mL, negative aspiration for CSF, no paresthesia, ultrasound image on chart, no intravascular symptoms, negative aspiration for blood and local visualized surrounding nerve on ultrasound  Patient tolerance:  Patient tolerated the procedure well with no immediate complications

## 2021-10-06 NOTE — PROGRESS NOTES
Chart screened by  for discharge planning. Per notes pt is planned for I&D of foot today at 230 pm. CM will remain available to assist with dc needs post surgery. Care Management Interventions  PCP Verified by CM:  Yes  Discharge Durable Medical Equipment: No  Physical Therapy Consult: No  Occupational Therapy Consult: No  Speech Therapy Consult: No  Support Systems: Spouse/Significant Other  Confirm Follow Up Transport: Family  Discharge Location  Discharge Placement: Unable to determine at this time

## 2021-10-06 NOTE — PROGRESS NOTES
VANCO DAILY FOLLOW UP NOTE  [address] [facility] Pharmacokinetic Monitoring Service - Vancomycin    Consulting Provider: Maryellen Villegas MD   Indication: SSTI  Target Concentration: Goal trough of 10-15 mg/L and AUC/MARIA ELENA <500 mg*hr/L  Day of Therapy: 2  Additional Antimicrobials: cefepime 1g q12h    Pertinent Laboratory Values: Wt Readings from Last 1 Encounters:   10/05/21 120.2 kg (265 lb)     Temp Readings from Last 1 Encounters:   10/06/21 98.1 °F (36.7 °C)     No components found for: PROCAL  Recent Labs     10/06/21  0437 10/05/21  2234 10/05/21  1652   BUN 20  --  21   CREA 1.30  --  1.38   WBC 9.4  --  11.1   LAC  --  0.9 1.1     Estimated Creatinine Clearance: 68.6 mL/min (based on SCr of 1.3 mg/dL). No results found for: Ibeth Mena    MRSA Nasal Swab: N/A. Non-respiratory infection. .      Assessment:  Date/Time Dose Concentration AUC   10/6 1500 mg q24 13.4 444   Note: Serum concentrations collected for AUC dosing may appear elevated if collected in close proximity to the dose administered, this is not necessarily an indication of toxicity    Plan:  1. Current dosing regimen is 1500 mg q24h  2. Continue current dose  3. Repeat vancomycin concentration ordered for 10/7 @ 0400   4.  Pharmacy will continue to monitor patient and adjust therapy as indicated    Thank you for the consult,  Yayo Lang, PharmD, [address] [facility] Pharmacist  [phone]

## 2021-10-06 NOTE — PROGRESS NOTES
BSN, CM contacted pt. Pt alert and oriented to person, place, time, and situation. Pt verified demographic information. PCP verified as Dr. Ruth Lares and pt last seen PCP in September. Pt lives in 1 story home with spouse, 2 steps to enter into the home. Pt reports being independent with ADLs and driving until about 2 weeks ago. Pt reports having DMEs such as cane, walker, and wheelchair. Pt family able to transport home, to doctor apt,  medications, and get groceries. Pt primary pharmacy is [facility] on [address]. Pt reports being able to afford medications. Pt reports plans to discharge home and open to possibility of home health services if recommended. Pt has used HH in past but cannot recall name but has not been to SNF. Discussed procedure scheduled for today. Pt plans to go home even if amputation required. Pt reports his wife is a RN and can assist as needed. CM to continue to follow and monitor for any needs that may occur. Care Management Interventions  PCP Verified by CM: Yes (Dr. Ruth Lares)  Mode of Transport at Discharge:  Other (see comment) (family )  Transition of Care Consult (CM Consult): Discharge Planning  Discharge Durable Medical Equipment: No  Physical Therapy Consult: No  Occupational Therapy Consult: No  Speech Therapy Consult: No  Support Systems: Spouse/Significant Other  Confirm Follow Up Transport: Family  The Plan for Transition of Care is Related to the Following Treatment Goals : Reurn to baseline   Freedom of Choice List was Provided with Basic Dialogue that Supports the Patient's Individualized Plan of Care/Goals, Treatment Preferences and Shares the Quality Data Associated with the Providers?: Yes  Discharge Location  Discharge Placement: Home

## 2021-10-06 NOTE — ANESTHESIA POSTPROCEDURE EVALUATION
Procedure(s):  LEFT FOOT INCISION AND DRAINAGE/ SUPINE  PT IS IN ER/ TO BE ADMITTED 10/5  ANKLE BLOCK W/ MAC.    total IV anesthesia    Anesthesia Post Evaluation      Multimodal analgesia: multimodal analgesia used between 6 hours prior to anesthesia start to PACU discharge  Patient location during evaluation: PACU  Patient participation: complete - patient participated  Level of consciousness: awake and alert  Pain management: adequate  Airway patency: patent  Anesthetic complications: no  Cardiovascular status: acceptable  Respiratory status: acceptable, spontaneous ventilation and nonlabored ventilation  Hydration status: acceptable  Post anesthesia nausea and vomiting:  none      INITIAL Post-op Vital signs:   Vitals Value Taken Time   /69 10/06/21 1537   Temp     Pulse 69 10/06/21 1539   Resp     SpO2 97 % 10/06/21 1539   Vitals shown include unvalidated device data.

## 2021-10-06 NOTE — ANESTHESIA PREPROCEDURE EVALUATION
Relevant Problems   No relevant active problems       Anesthetic History   No history of anesthetic complications            Review of Systems / Medical History  Patient summary reviewed and pertinent labs reviewed    Pulmonary    COPD (Asbestosis - mild): mild    Sleep apnea: CPAP           Neuro/Psych             Comments: neuropathy Cardiovascular    Hypertension: well controlled        Dysrhythmias (s/p ablation 2017) : atrial fibrillation  Pacemaker (pacer for bradycardia), CAD, PAD and cardiac stents (Most recent 6/18)    Exercise tolerance: <4 METS:        GI/Hepatic/Renal     GERD: well controlled           Endo/Other    Diabetes: well controlled, type 2, using insulin    Obesity, arthritis and cancer (prostate)     Other Findings              Physical Exam    Airway  Mallampati: II  TM Distance: > 6 cm  Neck ROM: normal range of motion   Mouth opening: Normal     Cardiovascular    Rhythm: regular  Rate: normal         Dental  No notable dental hx       Pulmonary  Breath sounds clear to auscultation               Abdominal         Other Findings            Anesthetic Plan    ASA: 3  Anesthesia type: total IV anesthesia - femoral single shot and popliteal fossa block      Post-op pain plan if not by surgeon: peripheral nerve block single    Induction: Intravenous  Anesthetic plan and risks discussed with: Patient and Family

## 2021-10-07 LAB
ANION GAP SERPL CALC-SCNC: 11 MMOL/L (ref 7–16)
BUN SERPL-MCNC: 21 MG/DL (ref 8–23)
CALCIUM SERPL-MCNC: 8.4 MG/DL (ref 8.3–10.4)
CHLORIDE SERPL-SCNC: 106 MMOL/L (ref 98–107)
CO2 SERPL-SCNC: 22 MMOL/L (ref 21–32)
CREAT SERPL-MCNC: 1.13 MG/DL (ref 0.8–1.5)
GLUCOSE BLD STRIP.AUTO-MCNC: 121 MG/DL (ref 65–100)
GLUCOSE BLD STRIP.AUTO-MCNC: 129 MG/DL (ref 65–100)
GLUCOSE BLD STRIP.AUTO-MCNC: 179 MG/DL (ref 65–100)
GLUCOSE BLD STRIP.AUTO-MCNC: 211 MG/DL (ref 65–100)
GLUCOSE SERPL-MCNC: 204 MG/DL (ref 65–100)
POTASSIUM SERPL-SCNC: 4.6 MMOL/L (ref 3.5–5.1)
SERVICE CMNT-IMP: ABNORMAL
SODIUM SERPL-SCNC: 139 MMOL/L (ref 136–145)
VANCOMYCIN SERPL-MCNC: 12.5 UG/ML

## 2021-10-07 PROCEDURE — 74011250636 HC RX REV CODE- 250/636: Performed by: NURSE PRACTITIONER

## 2021-10-07 PROCEDURE — 74011636637 HC RX REV CODE- 636/637: Performed by: FAMILY MEDICINE

## 2021-10-07 PROCEDURE — 2709999900 HC NON-CHARGEABLE SUPPLY

## 2021-10-07 PROCEDURE — 74011000258 HC RX REV CODE- 258: Performed by: FAMILY MEDICINE

## 2021-10-07 PROCEDURE — 82962 GLUCOSE BLOOD TEST: CPT

## 2021-10-07 PROCEDURE — 74011250636 HC RX REV CODE- 250/636: Performed by: INTERNAL MEDICINE

## 2021-10-07 PROCEDURE — 74011250636 HC RX REV CODE- 250/636: Performed by: FAMILY MEDICINE

## 2021-10-07 PROCEDURE — 80048 BASIC METABOLIC PNL TOTAL CA: CPT

## 2021-10-07 PROCEDURE — 74011250637 HC RX REV CODE- 250/637: Performed by: FAMILY MEDICINE

## 2021-10-07 PROCEDURE — 80202 ASSAY OF VANCOMYCIN: CPT

## 2021-10-07 PROCEDURE — 36415 COLL VENOUS BLD VENIPUNCTURE: CPT

## 2021-10-07 PROCEDURE — 74011250637 HC RX REV CODE- 250/637: Performed by: NURSE PRACTITIONER

## 2021-10-07 PROCEDURE — 65660000000 HC RM CCU STEPDOWN

## 2021-10-07 PROCEDURE — 74011636637 HC RX REV CODE- 636/637: Performed by: NURSE PRACTITIONER

## 2021-10-07 RX ORDER — HYDROCODONE BITARTRATE AND ACETAMINOPHEN 5; 325 MG/1; MG/1
1 TABLET ORAL
Status: DISCONTINUED | OUTPATIENT
Start: 2021-10-07 | End: 2021-10-13 | Stop reason: HOSPADM

## 2021-10-07 RX ORDER — VANCOMYCIN HYDROCHLORIDE
1250
Status: DISCONTINUED | OUTPATIENT
Start: 2021-10-07 | End: 2021-10-09

## 2021-10-07 RX ORDER — HEPARIN SODIUM 5000 [USP'U]/ML
5000 INJECTION, SOLUTION INTRAVENOUS; SUBCUTANEOUS EVERY 8 HOURS
Status: DISCONTINUED | OUTPATIENT
Start: 2021-10-07 | End: 2021-10-08

## 2021-10-07 RX ORDER — AMLODIPINE BESYLATE 10 MG/1
10 TABLET ORAL DAILY
Status: DISCONTINUED | OUTPATIENT
Start: 2021-10-07 | End: 2021-10-13 | Stop reason: HOSPADM

## 2021-10-07 RX ADMIN — INSULIN GLARGINE 30 UNITS: 100 INJECTION, SOLUTION SUBCUTANEOUS at 08:57

## 2021-10-07 RX ADMIN — FLUTICASONE PROPIONATE 2 SPRAY: 50 SPRAY, METERED NASAL at 09:00

## 2021-10-07 RX ADMIN — SODIUM CHLORIDE 100 ML/HR: 900 INJECTION, SOLUTION INTRAVENOUS at 03:41

## 2021-10-07 RX ADMIN — CEFEPIME HYDROCHLORIDE 1 G: 1 INJECTION, POWDER, FOR SOLUTION INTRAMUSCULAR; INTRAVENOUS at 05:32

## 2021-10-07 RX ADMIN — TRAZODONE HYDROCHLORIDE 75 MG: 50 TABLET ORAL at 21:21

## 2021-10-07 RX ADMIN — PREDNISONE 5 MG: 10 TABLET ORAL at 17:11

## 2021-10-07 RX ADMIN — INSULIN LISPRO 6 UNITS: 100 INJECTION, SOLUTION INTRAVENOUS; SUBCUTANEOUS at 12:08

## 2021-10-07 RX ADMIN — SOTALOL HYDROCHLORIDE 40 MG: 80 TABLET ORAL at 08:58

## 2021-10-07 RX ADMIN — VANCOMYCIN HYDROCHLORIDE 1250 MG: 10 INJECTION, POWDER, LYOPHILIZED, FOR SOLUTION INTRAVENOUS at 12:16

## 2021-10-07 RX ADMIN — Medication 10 ML: at 05:33

## 2021-10-07 RX ADMIN — HEPARIN SODIUM 5000 UNITS: 5000 INJECTION INTRAVENOUS; SUBCUTANEOUS at 17:10

## 2021-10-07 RX ADMIN — SOTALOL HYDROCHLORIDE 40 MG: 80 TABLET ORAL at 17:10

## 2021-10-07 RX ADMIN — Medication 10 ML: at 21:23

## 2021-10-07 RX ADMIN — FAMOTIDINE 20 MG: 20 TABLET ORAL at 08:59

## 2021-10-07 RX ADMIN — AMLODIPINE BESYLATE 10 MG: 10 TABLET ORAL at 09:12

## 2021-10-07 RX ADMIN — Medication 10 ML: at 14:00

## 2021-10-07 RX ADMIN — PREDNISONE 5 MG: 10 TABLET ORAL at 08:58

## 2021-10-07 RX ADMIN — HEPARIN SODIUM 5000 UNITS: 5000 INJECTION INTRAVENOUS; SUBCUTANEOUS at 08:56

## 2021-10-07 RX ADMIN — HYDROCODONE BITARTRATE AND ACETAMINOPHEN 1 TABLET: 5; 325 TABLET ORAL at 14:11

## 2021-10-07 RX ADMIN — EZETIMIBE 10 MG: 10 TABLET ORAL at 09:00

## 2021-10-07 RX ADMIN — FAMOTIDINE 20 MG: 20 TABLET ORAL at 17:11

## 2021-10-07 RX ADMIN — LOSARTAN POTASSIUM 100 MG: 50 TABLET, FILM COATED ORAL at 08:58

## 2021-10-07 RX ADMIN — CEFEPIME HYDROCHLORIDE 1 G: 1 INJECTION, POWDER, FOR SOLUTION INTRAMUSCULAR; INTRAVENOUS at 17:10

## 2021-10-07 NOTE — PROGRESS NOTES
Hospitalist Progress Note   Admit Date:  10/5/2021  4:54 PM   Name:  Emma Matson   Age:  77 y.o. Sex:  male  :  1945   MRN:  [id number]   Room:  [place] [address]    Presenting Complaint: Foot Pain    Reason(s) for Admission: Abscess of left foot [facility] Course & Interval History:   Dhruv Cali is a 77year old male with medical history of chronic left foot diabetic ulcer, polymyalgia rheumatica on prednisone, A. fib on sotalol, s/p pacemaker, COPD, diabetes mellitus type 2 on Tresiba, hypertension, Afib, obesity  Presented to the ER with a complaint of swelling, erythema, and drainage left foot. Had previously seen Dr. Sonia Ayala who directed patient to ER for surgical intervention     Subjective (10/07/21): Patient reports minimal pain LLE due to neuropathy    Assessment & Plan:     Principal Problem:  Abscess left foot region with chronic diabetic foot ulcer  Continue broad-spectrum antibiotics with cefepime and vancomycin  10/6/21 Patient going for I&D today; prn analgesia and wound care; wound Cx growing GNR; BCx NGTD   10/7/21 Ortho with plans for partial amputation left foot on ; Continue Vanc/Cefepime    Diabetes mellitus type 2/diabetic neuropathy bilateral feet  Patient on Tresiba at home  Presently here on sliding scale and Lantus  10/6/21 Holding Lantus today; will restart at lower dose tomorrow; BGLs ranging   And patient is NPO currently; Continue AC/HS accu checks with [facility]   10/7/21 Lantus restarted; patient is now eating; Adjust prn     History of A. Fib/pacemaker  On sotalol and Xarelto  Reviewed the dosage of sotalol. Patient took Xarelto today, need to start on heparin drip from tomorrow probably no bolus.   10/6/21 Holding Xarelto with restart per Ortho recs; telemetry   10/7/21 Started patient on Heparin; resume Xarelto per Ortho recs     Hyperlipidemia  Continue Zetia     GERD  Continue famotidine     Hypertension  Continue amlodipine and losartan  10/7/21 Amlodipine dosing increased due to hypertension; monitor and adjust meds prn      Sleep apnea  Continue CPAP     Polymyalgia rheumatica  Continue on prednisone  Needs Solu-Cortef prior to surgery  10/6/21 Stress dosing Solu Cortef      COPD  As needed nebs     Dispo/Discharge Planning:     Dispo pending; Patient desires to discharge home    Diet:  ADULT DIET Regular; 4 carb choices (60 gm/meal)  DVT PPx: Heparin   Code status: Full Code    Hospital Problems as of 10/7/2021 Date Reviewed: 10/5/2021        Codes Class Noted - Resolved POA    * (Principal) Abscess of left foot ICD-10-CM: B66.261  ICD-9-CM: 682.7  10/5/2021 - Present Unknown        DM type 2 (diabetes mellitus, type 2) ([facility] ICD-10-CM: E11.9  ICD-9-CM: 250.00  10/5/2021 - Present Unknown        HTN (hypertension) ICD-10-CM: I10  ICD-9-CM: 401.9  10/5/2021 - Present Unknown        Sleep apnea ICD-10-CM: G47.30  ICD-9-CM: 780.57  10/5/2021 - Present Unknown        COPD (chronic obstructive pulmonary disease) ([facility] ICD-10-CM: J44.9  ICD-9-CM: 496  10/5/2021 - Present Unknown        Polymyalgia rheumatica ([facility] ICD-10-CM: M35.3  ICD-9-CM: 988  10/5/2021 - Present Unknown        Hyperlipidemia ICD-10-CM: E78.5  ICD-9-CM: 272.4  10/5/2021 - Present Unknown        Diabetic neuropathy associated with type 2 diabetes mellitus ([facility] ICD-10-CM: E11.40  ICD-9-CM: 250.60, 357.2  10/5/2021 - Present Unknown        Class 1 obesity in adult ICD-10-CM: E66.9  ICD-9-CM: 278.00  10/5/2021 - Present Unknown        CAD (coronary artery disease) ICD-10-CM: I25.10  ICD-9-CM: 414.00  10/5/2021 - Present Unknown        H/O cardiac pacemaker ICD-10-CM: Z95.0  ICD-9-CM: V12.50, V45.01  10/5/2021 - Present Unknown              Objective:     Patient Vitals for the past 24 hrs:   Temp Pulse Resp BP SpO2   10/07/21 1140 97.9 °F (36.6 °C) 73 20 (!) 154/64 95 %   10/07/21 0818 97.8 °F (36.6 °C) 70 20 (!) 182/78 96 %   10/07/21 0512 98.2 °F (36.8 °C) 72 18 (!) 173/67 96 %   10/07/21 0400 -- 71 -- -- --   10/07/21 0047 97.7 °F (36.5 °C) 70 18 (!) 145/63 94 %   10/07/21 0000 -- 72 -- -- --   10/06/21 2042 97.6 °F (36.4 °C) 71 18 (!) 157/75 98 %   10/06/21 2000 -- 70 -- -- --   10/06/21 1702 97.4 °F (36.3 °C) 69 18 (!) 164/70 96 %   10/06/21 1557 97.2 °F (36.2 °C) 69 18 (!) 162/64 97 %   10/06/21 1552 -- 69 18 (!) 165/76 97 %   10/06/21 1546 -- 69 16 (!) 160/69 97 %   10/06/21 1542 -- 69 16 (!) 151/64 97 %   10/06/21 1537 -- 69 14 (!) 147/69 100 %   10/06/21 1532 -- 69 14 (!) 145/59 99 %   10/06/21 1527 -- 69 12 (!) 141/64 99 %   10/06/21 1526 97 °F (36.1 °C) 69 12 (!) 141/64 99 %   10/06/21 1412 -- 69 -- -- --   10/06/21 1407 -- 69 17 (!) 129/59 --   10/06/21 1319 -- 69 -- (!) 188/74 96 %   10/06/21 1305 98.2 °F (36.8 °C) -- 18 -- --     Oxygen Therapy  O2 Sat (%): 95 % (10/07/21 1140)  Pulse via Oximetry: 71 beats per minute (10/06/21 1557)  O2 Device: None (Room air) (10/06/21 2042)  Skin Assessment: Clean, dry, & intact (10/06/21 1305)  O2 Flow Rate (L/min): 3 l/min (10/06/21 1526)    Estimated body mass index is 33.12 kg/m² as calculated from the following:    Height as of this encounter: 6' 3\" (1.905 m). Weight as of this encounter: 120.2 kg (265 lb). Intake/Output Summary (Last 24 hours) at 10/7/2021 1220  Last data filed at 10/7/2021 1140  Gross per 24 hour   Intake 976 ml   Output 2560 ml   Net -1584 ml         Physical Exam:     General:    Well nourished. No overt distress  Head:  Normocephalic, atraumatic  Eyes:  Sclerae appear normal.  Pupils equally round. ENT:  Nares appear normal, no drainage. Moist oral mucosa  Neck:  No restricted ROM. Trachea midline   CV:   RRR. No m/r/g. No jugular venous distension. Lungs:   CTAB. No wheezing, rhonchi, or rales. Respirations even, unlabored  Abdomen: Bowel sounds present. Soft, nontender, nondistended. Extremities: No cyanosis or clubbing. Left foot wound  Skin:     No rashes and normal coloration.    Warm and dry. Neuro:  CN II-XII grossly intact. A&Ox3  Psych:  Normal mood and affect. I have reviewed ordered lab tests and independently visualized imaging below:    Last 24hr Labs:  Recent Results (from the past 24 hour(s))   CULTURE, WOUND W GRAM STAIN    Collection Time: 10/06/21  2:44 PM    Specimen: Foot, left    SUPERFICIAL WOUND   Result Value Ref Range    Special Requests: NO SPECIAL REQUESTS      GRAM STAIN 5 TO 15 WBCS SEEN PER OIF     GRAM STAIN NO DEFINITE ORGANISM SEEN      Culture result:        NO GROWTH AFTER SHORT PERIOD OF INCUBATION. FURTHER RESULTS TO FOLLOW AFTER OVERNIGHT INCUBATION. CULTURE, WOUND Arch Pickles STAIN    Collection Time: 10/06/21  2:45 PM    Specimen: Foot, left    DEEP WOUND   Result Value Ref Range    Special Requests: NO SPECIAL REQUESTS      GRAM STAIN 0 TO 3 WBCS SEEN PER OIF     GRAM STAIN NO DEFINITE ORGANISM SEEN      Culture result:        NO GROWTH AFTER SHORT PERIOD OF INCUBATION. FURTHER RESULTS TO FOLLOW AFTER OVERNIGHT INCUBATION. CULTURE, WOUND Arch Pickles STAIN    Collection Time: 10/06/21  2:46 PM    Specimen: Foot, left    DEEP WOUND   Result Value Ref Range    Special Requests: NO SPECIAL REQUESTS      GRAM STAIN 2 TO 20 WBCS SEEN PER OIF     GRAM STAIN NO DEFINITE ORGANISM SEEN      Culture result:        NO GROWTH AFTER SHORT PERIOD OF INCUBATION. FURTHER RESULTS TO FOLLOW AFTER OVERNIGHT INCUBATION.    GLUCOSE, POC    Collection Time: 10/06/21  4:57 PM   Result Value Ref Range    Glucose (POC) 133 (H) 65 - 100 mg/dL    Performed by Elysia    GLUCOSE, POC    Collection Time: 10/06/21  8:59 PM   Result Value Ref Range    Glucose (POC) 400 (H) 65 - 100 mg/dL    Performed by Sarah    VANCOMYCIN, RANDOM    Collection Time: 10/07/21  4:13 AM   Result Value Ref Range    Vancomycin, random 96.2 UG/ML   METABOLIC PANEL, BASIC    Collection Time: 10/07/21  4:13 AM   Result Value Ref Range    Sodium 139 136 - 145 mmol/L    Potassium 4.6 3.5 - 5.1 mmol/L    Chloride 106 98 - 107 mmol/L    CO2 22 21 - 32 mmol/L    Anion gap 11 7 - 16 mmol/L    Glucose 204 (H) 65 - 100 mg/dL    BUN 21 8 - 23 MG/DL    Creatinine 1.13 0.8 - 1.5 MG/DL    GFR est AA >60 >60 ml/min/1.73m2    GFR est non-AA >60 >60 ml/min/1.73m2    Calcium 8.4 8.3 - 10.4 MG/DL   GLUCOSE, POC    Collection Time: 10/07/21  7:22 AM   Result Value Ref Range    Glucose (POC) 179 (H) 65 - 100 mg/dL    Performed by Yeny    GLUCOSE, POC    Collection Time: 10/07/21 11:16 AM   Result Value Ref Range    Glucose (POC) 211 (H) 65 - 100 mg/dL    Performed by Odalis Cope        All Micro Results     Procedure Component Value Units Date/Time    CULTURE, Daren Welch STAIN [[id number]] Collected: 10/06/21 1445    Order Status: Completed Specimen: Foot, left Updated: 10/07/21 1207     Special Requests: NO SPECIAL REQUESTS        GRAM STAIN 0 TO 3 WBCS SEEN PER OIF      NO DEFINITE ORGANISM SEEN        Culture result:       NO GROWTH AFTER SHORT PERIOD OF INCUBATION. FURTHER RESULTS TO FOLLOW AFTER OVERNIGHT INCUBATION. Staci [place] STAIN [[id number]] Collected: 10/06/21 1446    Order Status: Completed Specimen: Foot, left Updated: 10/07/21 1207     Special Requests: NO SPECIAL REQUESTS        GRAM STAIN 2 TO 20 WBCS SEEN PER OIF      NO DEFINITE ORGANISM SEEN        Culture result:       NO GROWTH AFTER SHORT PERIOD OF INCUBATION. FURTHER RESULTS TO FOLLOW AFTER OVERNIGHT INCUBATION. CULTURE, Daren Welch STAIN [[id number]] Collected: 10/06/21 1444    Order Status: Completed Specimen: Foot, left Updated: 10/07/21 1207     Special Requests: NO SPECIAL REQUESTS        GRAM STAIN 5 TO 15 WBCS SEEN PER OIF      NO DEFINITE ORGANISM SEEN        Culture result:       NO GROWTH AFTER SHORT PERIOD OF INCUBATION. FURTHER RESULTS TO FOLLOW AFTER OVERNIGHT INCUBATION.           CULTURE, Daren Villalobosmin STAIN [[id number]]  (Abnormal) Collected: 10/05/21 3513    Order Status: Completed Specimen: Wound from Foot Updated: 10/07/21 0852     Special Requests: LEFT FOOT        GRAM STAIN 0 TO 6 WBC'S/OIF      FEW GRAM NEGATIVE RODS        Culture result:       MODERATE GRAM NEGATIVE RODS IDENTIFICATION AND SUSCEPTIBILITY TO FOLLOW                  MODERATE 2ND GRAM NEGATIVE CATRACHO IDENTIFICATION AND SUSCEPTIBILITY TO FOLLOW                  CULTURE IN Alana Dye UPDATES TO FOLLOW          BLOOD CULTURE [[id number]] Collected: 10/05/21 1652    Order Status: Completed Specimen: Blood Updated: 10/07/21 0730     Special Requests: --        NO SPECIAL REQUESTS  LEFT  Antecubital       Culture result: NO GROWTH 2 DAYS       BLOOD CULTURE [[id number]] Collected: 10/05/21 1754    Order Status: Completed Specimen: Blood Updated: 10/07/21 0730     Special Requests: --        NO SPECIAL REQUESTS  RIGHT  Antecubital       Culture result: NO GROWTH 2 DAYS       CULTURE, ANAEROBIC [[id number]] Collected: 10/06/21 1444    Order Status: Completed Specimen: Wound Drainage Updated: 10/06/21 2303    CULTURE, ANAEROBIC [[id number]] Collected: 10/06/21 1446    Order Status: Completed Specimen: Wound Drainage Updated: 10/06/21 2302    CULTURE, ANAEROBIC [[id number]] Collected: 10/06/21 1445    Order Status: Completed Specimen: Wound Drainage Updated: 10/06/21 79-[address] [place] [[id number]] Collected: 10/06/21 1500    Order Status: Canceled Specimen: Wound Drainage     GRAM STAIN [[id number]] Collected: 10/06/21 1500    Order Status: Canceled Specimen: Wound     GRAM STAIN [[id number]] Collected: 10/06/21 1500    Order Status: Canceled Specimen: Wound           Other Studies:  No results found.     Current Meds:  Current Facility-Administered Medications   Medication Dose Route Frequency    heparin (porcine) injection 5,000 Units  5,000 Units SubCUTAneous Q8H    amLODIPine (NORVASC) tablet 10 mg  10 mg Oral DAILY    vancomycin (VANCOCIN) 1250 mg in  ml infusion  1,250 mg IntraVENous Q18H    HYDROcodone-acetaminophen (NORCO) 5-325 mg per tablet 1 Tablet  1 Tablet Oral Q4H PRN    insulin glargine (LANTUS) injection 30 Units  30 Units SubCUTAneous DAILY    sodium chloride (NS) flush 5-10 mL  5-10 mL IntraVENous PRN    ezetimibe (ZETIA) tablet 10 mg  10 mg Oral DAILY    famotidine (PEPCID) tablet 20 mg  20 mg Oral BID    fluticasone propionate (FLONASE) 50 mcg/actuation nasal spray 2 Spray  2 Spray Both Nostrils DAILY    nitroglycerin (NITROSTAT) tablet 0.4 mg  0.4 mg SubLINGual PRN    predniSONE (DELTASONE) tablet 5 mg  5 mg Oral BID WITH MEALS    sotaloL (BETAPACE) tablet 40 mg  40 mg Oral BID    sodium chloride (NS) flush 5-40 mL  5-40 mL IntraVENous Q8H    sodium chloride (NS) flush 5-40 mL  5-40 mL IntraVENous PRN    acetaminophen (TYLENOL) tablet 650 mg  650 mg Oral Q6H PRN    Or    acetaminophen (TYLENOL) suppository 650 mg  650 mg Rectal Q6H PRN    polyethylene glycol (MIRALAX) packet 17 g  17 g Oral DAILY PRN    ondansetron (ZOFRAN ODT) tablet 4 mg  4 mg Oral Q8H PRN    Or    ondansetron (ZOFRAN) injection 4 mg  4 mg IntraVENous Q6H PRN    traZODone (DESYREL) tablet 75 mg  75 mg Oral QHS    losartan (COZAAR) tablet 100 mg  100 mg Oral DAILY    cefepime (MAXIPIME) 1 g in 0.9% sodium chloride (MBP/ADV) 50 mL MBP  1 g IntraVENous Q12H    dextrose 40% (GLUTOSE) oral gel 1 Tube  15 g Oral PRN    glucagon (GLUCAGEN) injection 1 mg  1 mg IntraMUSCular PRN    dextrose (D50W) injection syrg 12.5-25 g  25-50 mL IntraVENous PRN    insulin lispro (HUMALOG) injection   SubCUTAneous AC&HS       Signed:  Corrie Collazo NP    Part of this note may have been written by using a voice dictation software. The note has been proof read but may still contain some grammatical/other typographical errors.

## 2021-10-07 NOTE — PROGRESS NOTES
[address] [facility]   Progress Note    Patient: Eric Padilla MRN: [id number]  SSN: [id number]    YOB: 1945  Age: 77 y.o. Sex: male      Admit Date: 10/5/2021    LOS: 2 days     Subjective:     Postop day 1 from left foot irrigation and debridement. Resting in bed. Wound VAC in place. No acute events overnight    Objective:     Vitals:    10/07/21 0000 10/07/21 0047 10/07/21 0400 10/07/21 0512   BP:  (!) 145/63  (!) 173/67   Pulse: 72 70 71 72   Resp:  18  18   Temp:  97.7 °F (36.5 °C)  98.2 °F (36.8 °C)   SpO2:  94%  96%   Weight:       Height:            Intake and Output:  Current Shift: No intake/output data recorded. Last three shifts: 10/05 1901 - 10/07 0700  In: 980 [P.O.:480; I.V.:500]  Out: 2410 [Urine:2400]    Physical Exam:   Left lower extremity: Dressing clean dry and intact. We will move ankle up and down. No sensation throughout the foot. Toes 1 through 5 warm well perfused. Wound VAC in place with good seal.  125 mmHg low continuous    Lab/Data Review:  CBC: No results found for: WBC, HGB, HGBEXT, HCT, HCTEXT, PLT, PLTEXT, HGBEXT, HCTEXT, PLTEXT       Assessment:     Principal Problem:    Abscess of left foot (10/5/2021)    Active Problems:    DM type 2 (diabetes mellitus, type 2) ([facility] (10/5/2021)      HTN (hypertension) (10/5/2021)      Sleep apnea (10/5/2021)      COPD (chronic obstructive pulmonary disease) ([facility] (10/5/2021)      Polymyalgia rheumatica ([facility] (10/5/2021)      Hyperlipidemia (10/5/2021)      Diabetic neuropathy associated with type 2 diabetes mellitus ([facility] (10/5/2021)      Class 1 obesity in adult (10/5/2021)      CAD (coronary artery disease) (10/5/2021)      H/O cardiac pacemaker (10/5/2021)        Plan:     1: WB status - NWB LLE  2: DVT px - Per primary team.  I recommend either heparin or Lovenox. 3: ABX -Maxipime and vancomycin  4: He had bone exposed in his plantar ulcer any an abscess around his first MTP joint.   I had a rc discussion with the patient today. He will need a secondary surgery, most likely Sunday. The plan will be foot amputation at the level of the midfoot, versus partial medial ray amputation. I discussed the pros and cons today with him. I explained that if we do a partial foot amputation of the medial column he still has a plantar wound that will need to be treated. He will need continued wound care and offloading in an attempt to heal this plantar wound. He still has risk for infection, he still has risk for continued antibiotics. The other option is amputation at the midtarsal level. This would remove his plantar wound and removed his infected bone. However this would leave him with a deficit. He would not able to walk for several weeks, approximately 6 weeks, and he would need a prosthesis. This would not improve his function, but rather decrease the burden of disease in his foot. He understands of both options, with pros and cons. He wishes to discuss this on the phone with his wife today. Based on their decision we will proceed with revision surgery to either be amputation of the foot around the midtarsal joints versus partial amputation of the foot.     Signed By: Lencho Banks MD     October 7, 2021

## 2021-10-07 NOTE — PROGRESS NOTES
VANCO DAILY FOLLOW UP NOTE  [address] [facility] Pharmacokinetic Monitoring Service - Vancomycin    Consulting Provider: Raquel Castanon MD   Indication: SSTI  Target Concentration: Goal trough of 10-15 mg/L and AUC/MARIA ELENA <500 mg*hr/L  Day of Therapy: 2  Additional Antimicrobials: cefepime 1g q12h    Pertinent Laboratory Values: Wt Readings from Last 1 Encounters:   10/05/21 120.2 kg (265 lb)     Temp Readings from Last 1 Encounters:   10/07/21 97.9 °F (36.6 °C)     No components found for: PROCAL  Recent Labs     10/07/21  0413 10/06/21  0437 10/05/21  2234 10/05/21  1652   BUN 21 20  --  21   CREA 1.13 1.30  --  1.38   WBC  --  9.4  --  11.1   LAC  --   --  0.9 1.1     Estimated Creatinine Clearance: 78.9 mL/min (based on SCr of 1.13 mg/dL). Lab Results   Component Value Date/Time    Vancomycin, random 12.5 10/07/2021 04:13 AM       MRSA Nasal Swab: N/A. Non-respiratory infection. .      Assessment:  Date/Time Dose Concentration AUC   10/7 @ 0413 1500 mg q24 12.5 391         Note: Serum concentrations collected for AUC dosing may appear elevated if collected in close proximity to the dose administered, this is not necessarily an indication of toxicity      Plan:  Current dosing regimen is sub-therapeutic  Increase dose to 1250mg q 18 hours, with last dose on 10/9 @ 1800  Will not order any more vancomycin levels  Pharmacy will continue to monitor patient and adjust therapy as indicated    Thank you for the consult,  CARL Morel  10/7/2021 11:44 AM

## 2021-10-08 LAB
ANION GAP SERPL CALC-SCNC: 3 MMOL/L (ref 7–16)
APTT PPP: 26.3 SEC (ref 24.1–35.1)
BASOPHILS # BLD: 0 K/UL (ref 0–0.2)
BASOPHILS NFR BLD: 1 % (ref 0–2)
BUN SERPL-MCNC: 18 MG/DL (ref 8–23)
CALCIUM SERPL-MCNC: 9.1 MG/DL (ref 8.3–10.4)
CHLORIDE SERPL-SCNC: 106 MMOL/L (ref 98–107)
CO2 SERPL-SCNC: 30 MMOL/L (ref 21–32)
CREAT SERPL-MCNC: 1.1 MG/DL (ref 0.8–1.5)
DIFFERENTIAL METHOD BLD: ABNORMAL
EOSINOPHIL # BLD: 0.1 K/UL (ref 0–0.8)
EOSINOPHIL NFR BLD: 1 % (ref 0.5–7.8)
ERYTHROCYTE [DISTWIDTH] IN BLOOD BY AUTOMATED COUNT: 12.2 % (ref 11.9–14.6)
GLUCOSE BLD STRIP.AUTO-MCNC: 100 MG/DL (ref 65–100)
GLUCOSE BLD STRIP.AUTO-MCNC: 103 MG/DL (ref 65–100)
GLUCOSE BLD STRIP.AUTO-MCNC: 154 MG/DL (ref 65–100)
GLUCOSE BLD STRIP.AUTO-MCNC: 197 MG/DL (ref 65–100)
GLUCOSE SERPL-MCNC: 160 MG/DL (ref 65–100)
HCT VFR BLD AUTO: 39.5 % (ref 41.1–50.3)
HGB BLD-MCNC: 12.7 G/DL (ref 13.6–17.2)
IMM GRANULOCYTES # BLD AUTO: 0 K/UL (ref 0–0.5)
IMM GRANULOCYTES NFR BLD AUTO: 1 % (ref 0–5)
LYMPHOCYTES # BLD: 1.2 K/UL (ref 0.5–4.6)
LYMPHOCYTES NFR BLD: 15 % (ref 13–44)
MCH RBC QN AUTO: 30.8 PG (ref 26.1–32.9)
MCHC RBC AUTO-ENTMCNC: 32.2 G/DL (ref 31.4–35)
MCV RBC AUTO: 95.9 FL (ref 79.6–97.8)
MONOCYTES # BLD: 0.6 K/UL (ref 0.1–1.3)
MONOCYTES NFR BLD: 8 % (ref 4–12)
NEUTS SEG # BLD: 6.2 K/UL (ref 1.7–8.2)
NEUTS SEG NFR BLD: 76 % (ref 43–78)
NRBC # BLD: 0 K/UL (ref 0–0.2)
PLATELET # BLD AUTO: 418 K/UL (ref 150–450)
PMV BLD AUTO: 9.1 FL (ref 9.4–12.3)
POTASSIUM SERPL-SCNC: 5.2 MMOL/L (ref 3.5–5.1)
RBC # BLD AUTO: 4.12 M/UL (ref 4.23–5.6)
SERVICE CMNT-IMP: ABNORMAL
SERVICE CMNT-IMP: NORMAL
SODIUM SERPL-SCNC: 139 MMOL/L (ref 136–145)
UFH PPP CHRO-ACNC: 0.37 IU/ML (ref 0.3–0.7)
WBC # BLD AUTO: 8.2 K/UL (ref 4.3–11.1)

## 2021-10-08 PROCEDURE — 76937 US GUIDE VASCULAR ACCESS: CPT

## 2021-10-08 PROCEDURE — 85520 HEPARIN ASSAY: CPT

## 2021-10-08 PROCEDURE — 36415 COLL VENOUS BLD VENIPUNCTURE: CPT

## 2021-10-08 PROCEDURE — 77030020847 HC STATLOK BARD -A

## 2021-10-08 PROCEDURE — 74011636637 HC RX REV CODE- 636/637: Performed by: FAMILY MEDICINE

## 2021-10-08 PROCEDURE — 74011250636 HC RX REV CODE- 250/636: Performed by: INTERNAL MEDICINE

## 2021-10-08 PROCEDURE — 65660000000 HC RM CCU STEPDOWN

## 2021-10-08 PROCEDURE — 85730 THROMBOPLASTIN TIME PARTIAL: CPT

## 2021-10-08 PROCEDURE — 74011250637 HC RX REV CODE- 250/637: Performed by: FAMILY MEDICINE

## 2021-10-08 PROCEDURE — 74011250636 HC RX REV CODE- 250/636: Performed by: NURSE PRACTITIONER

## 2021-10-08 PROCEDURE — 74011250637 HC RX REV CODE- 250/637: Performed by: NURSE PRACTITIONER

## 2021-10-08 PROCEDURE — 74011000258 HC RX REV CODE- 258: Performed by: FAMILY MEDICINE

## 2021-10-08 PROCEDURE — 2709999900 HC NON-CHARGEABLE SUPPLY

## 2021-10-08 PROCEDURE — 80048 BASIC METABOLIC PNL TOTAL CA: CPT

## 2021-10-08 PROCEDURE — 85025 COMPLETE CBC W/AUTO DIFF WBC: CPT

## 2021-10-08 PROCEDURE — 74011250636 HC RX REV CODE- 250/636: Performed by: FAMILY MEDICINE

## 2021-10-08 PROCEDURE — 74011636637 HC RX REV CODE- 636/637: Performed by: NURSE PRACTITIONER

## 2021-10-08 PROCEDURE — 77030041974 HC CATH SYS PERIPH TELE -B

## 2021-10-08 PROCEDURE — 82962 GLUCOSE BLOOD TEST: CPT

## 2021-10-08 RX ORDER — HEPARIN SODIUM 5000 [USP'U]/100ML
12-25 INJECTION, SOLUTION INTRAVENOUS
Status: DISCONTINUED | OUTPATIENT
Start: 2021-10-08 | End: 2021-10-11

## 2021-10-08 RX ADMIN — INSULIN LISPRO 3 UNITS: 100 INJECTION, SOLUTION INTRAVENOUS; SUBCUTANEOUS at 21:00

## 2021-10-08 RX ADMIN — SOTALOL HYDROCHLORIDE 40 MG: 80 TABLET ORAL at 08:25

## 2021-10-08 RX ADMIN — CEFEPIME HYDROCHLORIDE 1 G: 1 INJECTION, POWDER, FOR SOLUTION INTRAMUSCULAR; INTRAVENOUS at 17:00

## 2021-10-08 RX ADMIN — Medication 10 ML: at 16:47

## 2021-10-08 RX ADMIN — VANCOMYCIN HYDROCHLORIDE 1250 MG: 10 INJECTION, POWDER, LYOPHILIZED, FOR SOLUTION INTRAVENOUS at 05:47

## 2021-10-08 RX ADMIN — HEPARIN SODIUM 5000 UNITS: 5000 INJECTION INTRAVENOUS; SUBCUTANEOUS at 10:04

## 2021-10-08 RX ADMIN — FAMOTIDINE 20 MG: 20 TABLET ORAL at 17:00

## 2021-10-08 RX ADMIN — PREDNISONE 5 MG: 10 TABLET ORAL at 16:53

## 2021-10-08 RX ADMIN — SOTALOL HYDROCHLORIDE 40 MG: 80 TABLET ORAL at 21:00

## 2021-10-08 RX ADMIN — CEFEPIME HYDROCHLORIDE 1 G: 1 INJECTION, POWDER, FOR SOLUTION INTRAMUSCULAR; INTRAVENOUS at 05:47

## 2021-10-08 RX ADMIN — EZETIMIBE 10 MG: 10 TABLET ORAL at 08:26

## 2021-10-08 RX ADMIN — LOSARTAN POTASSIUM 100 MG: 50 TABLET, FILM COATED ORAL at 08:24

## 2021-10-08 RX ADMIN — HYDROCODONE BITARTRATE AND ACETAMINOPHEN 1 TABLET: 5; 325 TABLET ORAL at 13:38

## 2021-10-08 RX ADMIN — TRAZODONE HYDROCHLORIDE 75 MG: 50 TABLET ORAL at 21:00

## 2021-10-08 RX ADMIN — INSULIN GLARGINE 30 UNITS: 100 INJECTION, SOLUTION SUBCUTANEOUS at 08:34

## 2021-10-08 RX ADMIN — Medication 10 ML: at 22:01

## 2021-10-08 RX ADMIN — INSULIN LISPRO 3 UNITS: 100 INJECTION, SOLUTION INTRAVENOUS; SUBCUTANEOUS at 16:46

## 2021-10-08 RX ADMIN — HEPARIN SODIUM 5000 UNITS: 5000 INJECTION INTRAVENOUS; SUBCUTANEOUS at 01:54

## 2021-10-08 RX ADMIN — AMLODIPINE BESYLATE 10 MG: 10 TABLET ORAL at 08:25

## 2021-10-08 RX ADMIN — HEPARIN SODIUM 12 UNITS/KG/HR: 5000 INJECTION, SOLUTION INTRAVENOUS at 16:48

## 2021-10-08 RX ADMIN — HYDROCODONE BITARTRATE AND ACETAMINOPHEN 1 TABLET: 5; 325 TABLET ORAL at 20:58

## 2021-10-08 RX ADMIN — Medication 10 ML: at 06:07

## 2021-10-08 RX ADMIN — POLYETHYLENE GLYCOL 3350 17 G: 17 POWDER, FOR SOLUTION ORAL at 12:28

## 2021-10-08 RX ADMIN — FAMOTIDINE 20 MG: 20 TABLET ORAL at 08:26

## 2021-10-08 RX ADMIN — PREDNISONE 5 MG: 10 TABLET ORAL at 08:25

## 2021-10-08 NOTE — PROGRESS NOTES
Chart reviewed. Pt to have partial foot amputation on Sunday. CM to continue to follow and monitor for any further needs.

## 2021-10-08 NOTE — PROGRESS NOTES
Physician Progress Note      Silvestre Bailey  CSN #:                  [id number]  :                       1945  ADMIT DATE:       10/5/2021 4:54 PM  [address] [place] DATE:  RESPONDING  PROVIDER #:        Gregoria Miguel MD          QUERY TEXT:    Patient admitted with diabetic foot ulcer, noted to have atrial fibrillation and is maintained on Xarelto. If possible, please document in progress notes and?discharge?summary if you are evaluating and/or treating any of the following: The medical record reflects the following:?  ? Risk Factors: 77 yr, HTN, DM, Afib  ? Clinical Indicators: age 77, Glucose 211, 129, 175/83, 166/73  ? Treatment: Xarelto,  Options provided:  -- Secondary hypercoagulable state in a patient with atrial fibrillation  -- Other - I will add my own diagnosis  -- Disagree - Not applicable / Not valid  -- Disagree - Clinically unable to determine / Unknown  -- Refer to Clinical Documentation Reviewer    PROVIDER RESPONSE TEXT:    This patient has secondary hypercoagulable state related to atrial fibrillation.     Query created by: Amber Puga on 10/8/2021 2:40 PM      Electronically signed by:  Gregoria Miguel MD 10/8/2021 3:23 PM

## 2021-10-08 NOTE — PROGRESS NOTES
Reviewed notes for concerns      Full code    No directives    [address]    Lives in [place]    left foot debridement    Amputation soon ? ?     Dm    Copd                  Will continue to assess how to best serve this family

## 2021-10-08 NOTE — PROGRESS NOTES
[facility]   Progress Note    Patient: Torin Beebe MRN: [id number]  SSN: [id number]    YOB: 1945  Age: 77 y.o. Sex: male      Admit Date: 10/5/2021    LOS: 3 days     Subjective:     Postop day 2 from left foot irrigation and debridement. Resting in bed. Wound VAC in place. No acute events overnight. Patient states he is ready for the amputation. Objective:     Vitals:    10/07/21 1527 10/07/21 1640 10/08/21 0035 10/08/21 0449   BP: (!) 157/69 125/66 (!) 147/72 (!) 148/75   Pulse: 70 73 70 71   Resp: 20 18 18 18   Temp: 98.1 °F (36.7 °C) 98.1 °F (36.7 °C) 97.8 °F (36.6 °C) 97.7 °F (36.5 °C)   SpO2: 97% 94% 99% 98%   Weight:       Height:            Intake and Output:  Current Shift: No intake/output data recorded. Last three shifts: 10/06 1901 - 10/08 0700  In: 65 [P.O.:956]  Out: 4675 [Urine:4675]    Physical Exam:   Left lower extremity: Dressing clean dry and intact. We will move ankle up and down. No sensation throughout the foot. Toes 1 through 5 warm well perfused. Wound VAC in place with good seal.  125 mmHg low continuous    Lab/Data Review:  CBC: No results found for: WBC, HGB, HGBEXT, HCT, HCTEXT, PLT, PLTEXT, HGBEXT, HCTEXT, PLTEXT       Assessment:     Principal Problem:    Abscess of left foot (10/5/2021)    Active Problems:    DM type 2 (diabetes mellitus, type 2) ([facility] (10/5/2021)      HTN (hypertension) (10/5/2021)      Sleep apnea (10/5/2021)      COPD (chronic obstructive pulmonary disease) ([facility] (10/5/2021)      [facility] rhe[facility] (10/5/2021)      Hyperlipidemia (10/5/2021)      Diabetic neuropathy associated with type 2 diabetes mellitus ([address] (10/5/2021)      Class 1 obesity in adult (10/5/2021)      CAD (coronary artery disease) (10/5/2021)      H/O cardiac pacemaker (10/5/2021)        Plan:     1: WB status - NWB LLE  2: DVT px - Per primary team.  I recommend either heparin or Lovenox.   3: ABX -Maxipime and vancomycin  4: I discussed with the patient has morning revision I&D's, partial potation versus transmetatarsal/midfoot amputation. He elected for amputation as I gives him the best chance to heal his wounds. We will proceed with amputation of the foot. I will evaluate for potential surgical time. Will most likely be Sunday or Monday.     Signed By: Glen Gomes MD     October 8, 2021

## 2021-10-08 NOTE — PROGRESS NOTES
VANCO DAILY FOLLOW UP NOTE  [facility] Pharmacokinetic Monitoring Service - Vancomycin    Consulting Provider: Bernardo Woods MD   Indication: SSTI  Target Concentration: Goal trough of 10-15 mg/L and AUC/MARIA ELENA <500 mg*hr/L  Day of Therapy: 2  Additional Antimicrobials: cefepime 1g q12h    Pertinent Laboratory Values: Wt Readings from Last 1 Encounters:   10/05/21 120.2 kg (265 lb)     Temp Readings from Last 1 Encounters:   10/08/21 98 °F (36.7 °C)     No components found for: PROCAL  Recent Labs     10/07/21  0413 10/06/21  0437 10/05/21  2234 10/05/21  1652   BUN 21 20  --  21   CREA 1.13 1.30  --  1.38   WBC  --  9.4  --  11.1   LAC  --   --  0.9 1.1     Estimated Creatinine Clearance: 78.9 mL/min (based on SCr of 1.13 mg/dL). Lab Results   Component Value Date/Time    Vancomycin, random 12.5 10/07/2021 04:13 AM       MRSA Nasal Swab: N/A. Non-respiratory infection. .      Assessment:  Date/Time Dose Concentration AUC   10/7 @ 0413 1500 mg q24 12.5 391         Note: Serum concentrations collected for AUC dosing may appear elevated if collected in close proximity to the dose administered, this is not necessarily an indication of toxicity      Plan:    Continue 1250mg q 18 hours, with last dose on 10/9 @ 1800  Will not order any more vancomycin levels  Pharmacy will continue to monitor patient and adjust therapy as indicated    Thank you for the consult,  CARL Acuna  10/8/2021 11:44 AM

## 2021-10-08 NOTE — PROGRESS NOTES
[facility] Hospitalist Progress Note     Name:  Loni Jaramillo  Age:76 y.o. Sex:male   :  1945    MRN:  [id number]     Admit Date:  10/5/2021    Reason for Admission:  Abscess of left foot [L02.612]    Assessment & Plan     -Left plantar foot ulcer with dorsal abscess   10/6/2021  s/pPartial excision first metatarsal head with bone biopsy   Debridement of the left foot abscess  Patient presently has wound VAC  Initial wound culture in ER-positive for Klebsiella oxytoca, E. coli, staph aureus, Enterococcus  Repeat wound culture postop showed gram-negative rods  Patient presently on Vanco and Zosyn    -History of A. fib on Xarelto/history of pacemaker  Presently Xarelto held  Started on heparin drip  Continue sotalol    -Diabetes mellitus type 2/diabetic neuropathy bilateral feet  On Tresiba at home  Continue on sliding scale and Lantus    -Hyperlipidemia  Continue Zetia    -Hypertension  Continue amlodipine and losartan    -Sleep apnea  Continue CPAP    -History of polymyalgia rheumatica  Continue prednisone 5 mg twice daily    -COPD  As needed nebs    -Obesity, BMI of 33.12    Diet:  DIET ADULT  DVT PPx:heparin drip  Code: Full Code        Hospital Course/Subjective:   Loni Jaramillo is a 77 y.o. male with medical history of chronic left foot diabetic ulcer, polymyalgia rheumatica on prednisone, A. fib on sotalol, s/p pacemaker, COPD, diabetes mellitus type 2 on Tresiba, hypertension only on losartan/amlodipine, hyperlipidemia, obesity, A. fib on Xarelto, coronary disease with multiple stents acid reflux, diabetic neuropathy and obstructive sleep apnea on CPAP    Admitted for left plantar foot ulcer and dorsal abscess  S/p partial excision first metatarsal head with bone biopsy and debridement of the left foot abscess on 10/6/2021. Ortho planning for transmetatarsal/midfoot amputation either on  or Monday.         Subjective/24 hr Events (10/08/21):  10/8/2021  In bed, no complaints, has wound VAC to the left foot, has neuropathy, no pain. .    Review of Systems: 14 point review of systems is otherwise negative with the exception of the elements mentioned above. Objective:     Patient Vitals for the past 24 hrs:   Temp Pulse Resp BP SpO2   10/08/21 1218 98.2 °F (36.8 °C) 70 18 (!) 175/83 97 %   10/08/21 0801 98 °F (36.7 °C) 70 18 (!) 166/73 97 %   10/08/21 0449 97.7 °F (36.5 °C) 71 18 (!) 148/75 98 %   10/08/21 0035 97.8 °F (36.6 °C) 70 18 (!) 147/72 99 %   10/07/21 1640 98.1 °F (36.7 °C) 73 18 125/66 94 %     Oxygen Therapy  O2 Sat (%): 97 % (10/08/21 1218)  Pulse via Oximetry: 71 beats per minute (10/06/21 1557)  O2 Device: None (Room air) (10/08/21 1218)  Skin Assessment: Clean, dry, & intact (10/06/21 1305)  O2 Flow Rate (L/min): 3 l/min (10/06/21 1526)    Body mass index is 33.12 kg/m². Physical Exam:   General:     alert, awake, no acute distress. Well nourished  HENT:   normocephalic, atraumatic. Oropharynx clear with moist mucous membranes and normal hard/soft palate  Eyes:   anicteric sclerae, moist conjunctiva, PERRL, EOMI  Neck:    supple, non-tender. Trachea midline. Lungs:   CTAB, no wheezing, rhonchi, rales  Cardiac:   RRR, Normal S1 and S2. No S3, S4 or murmurs. Abdomen:   Soft, non distended, nontender, +BS, no guarding/rebound. Obese, BMI 33.12  Extremities:   Warm, dry. No edema , pedal pulses present, no digital cyanosis  Skin:   Dressing to the left foot, wound VAC in place  Neuro:   AAOx3.  No gross focal neurological deficit,  Cranial nerves II-XII grossly intact  Neuropathy bilateral of both feet  Psychiatric:  No anxiety, calm, cooperative      Data Review:  I have reviewed all labs, meds, and studies from the last 24 hours:    Labs:  Recent Results (from the past 24 hour(s))   GLUCOSE, POC    Collection Time: 10/07/21  4:31 PM   Result Value Ref Range    Glucose (POC) 129 (H) 65 - 100 mg/dL    Performed by Sulaiman Be, POC    Collection Time: 10/07/21  9:20 PM   Result Value Ref Range    Glucose (POC) 121 (H) 65 - 100 mg/dL    Performed by Ken QUILES, POC    Collection Time: 10/08/21  7:53 AM   Result Value Ref Range    Glucose (POC) 103 (H) 65 - 100 mg/dL    Performed by Quintin Alonso, POC    Collection Time: 10/08/21 12:12 PM   Result Value Ref Range    Glucose (POC) 100 65 - 100 mg/dL    Performed by [facility]        All Micro Results     Procedure Component Value Units Date/Time    CULTURE, Tunnelton Ards STAIN [[id number]]  (Abnormal) Collected: 10/06/21 1445    Order Status: Completed Specimen: Foot, left Updated: 10/08/21 0921     Special Requests: NO SPECIAL REQUESTS        GRAM STAIN 0 TO 3 WBCS SEEN PER OIF      NO DEFINITE ORGANISM SEEN        Culture result:       SCANT GRAM NEGATIVE RODS SUBCULTURE IN PROGRESS          CULTURE, Faisal Ards STAIN [[id number]]  (Abnormal) Collected: 10/06/21 1446    Order Status: Completed Specimen: Foot, left Updated: 10/08/21 0911     Special Requests: NO SPECIAL REQUESTS        GRAM STAIN 2 TO 20 WBCS SEEN PER OIF      NO DEFINITE ORGANISM SEEN        Culture result:       SCANT GRAM NEGATIVE RODS SUBCULTURE IN PROGRESS          BLOOD CULTURE [[id number]] Collected: 10/05/21 1754    Order Status: Completed Specimen: Blood Updated: 10/08/21 0905     Special Requests: --        NO SPECIAL REQUESTS  RIGHT  Antecubital       Culture result: NO GROWTH 3 DAYS       BLOOD CULTURE [[id number]] Collected: 10/05/21 1652    Order Status: Completed Specimen: Blood Updated: 10/08/21 0905     Special Requests: --        NO SPECIAL REQUESTS  LEFT  Antecubital       Culture result: NO GROWTH 3 DAYS       CULTURE, WOUND Coit Lute STAIN [[id number]]  (Abnormal) Collected: 10/06/21 1444    Order Status: Completed Specimen: Foot, left Updated: 10/08/21 0900     Special Requests: NO SPECIAL REQUESTS        GRAM STAIN 5 TO 15 WBCS SEEN PER OIF      NO DEFINITE ORGANISM SEEN        Culture result: LIGHT GRAM NEGATIVE RODS SUBCULTURE IN PROGRESS          CULTURE, ANAEROBIC [[id number]] Collected: 10/06/21 1445    Order Status: Completed Specimen: Foot, left Updated: 10/08/21 0841     Special Requests: NO SPECIAL REQUESTS        Culture result:       SUBCULTURE IS NECESSARY TO DETERMINE PRESENCE OR ABSENCE OF ANAEROBIC BACTERIA IN THIS CULTURE. FURTHER REPORT TO FOLLOW AFTER INCUBATION OF SUBCULTURE. CULTURE, ANAEROBIC [[id number]] Collected: 10/06/21 1446    Order Status: Completed Specimen: Foot, left Updated: 10/08/21 0841     Special Requests: NO SPECIAL REQUESTS        Culture result:       SUBCULTURE IS NECESSARY TO DETERMINE PRESENCE OR ABSENCE OF ANAEROBIC BACTERIA IN THIS CULTURE. FURTHER REPORT TO FOLLOW AFTER INCUBATION OF SUBCULTURE. CULTURE, ANAEROBIC [[id number]] Collected: 10/06/21 1444    Order Status: Completed Specimen: Foot, left Updated: 10/08/21 0841     Special Requests: NO SPECIAL REQUESTS        Culture result:       SUBCULTURE IS NECESSARY TO DETERMINE PRESENCE OR ABSENCE OF ANAEROBIC BACTERIA IN THIS CULTURE. FURTHER REPORT TO FOLLOW AFTER INCUBATION OF SUBCULTURE.           CULTURE, Dulcy Kylah STAIN [[id number]]  (Abnormal)  (Susceptibility) Collected: 10/05/21 1738    Order Status: Completed Specimen: Wound from Foot Updated: 10/08/21 0800     Special Requests: LEFT FOOT        GRAM STAIN 0 TO 6 WBC'S/OIF      FEW GRAM NEGATIVE RODS        Culture result:       MODERATE KLEBSIELLA OXYTOCA            MODERATE ESCHERICHIA COLI               STAPHYLOCOCCUS AUREUS ISOLATED FROM BROTH ONLY SENSITIVITY TO FOLLOW                  PRESUMPTIVE ENTEROCOCCUS SPECIES ISOLATED FROM BROTH ONLY IDENTIFICATION AND SUSCEPTIBILITY TO FOLLOW                  CULTURE IN [facility] UPDATES TO [facility] Race STAIN [[id number]] Collected: 10/06/21 1500    Order Status: Canceled Specimen: Wound Drainage     GRAM STAIN [[id number]] Collected: 10/06/21 1500    Order Status: Canceled Specimen: Wound     Pamla Frame [[id number]] Collected: 10/06/21 1500    Order Status: Canceled Specimen: Wound           EKG Results     Procedure 720 Value Units Date/Time    EKG, 12 LEAD, INITIAL [[id number]] Collected: 10/05/21 1658    Order Status: Completed Updated: 10/06/21 1869     Ventricular Rate 73 BPM      Atrial Rate 73 BPM      P-R Interval 138 ms      QRS Duration 84 ms      Q-T Interval 400 ms      QTC Calculation (Bezet) 440 ms      Calculated P Axis 74 degrees      Calculated R Axis 69 degrees      Calculated T Axis 70 degrees      Diagnosis --     Normal sinus rhythm  Normal ECG  No previous ECGs available  Confirmed by Crys Moore MD (), Miranda Campuzano ([id number]) on 10/6/2021 6:32:25 AM            Other Studies:  No results found.     Current Meds:   Current Facility-Administered Medications   Medication Dose Route Frequency    heparin 25,000 units in dextrose 500 mL infusion  12-25 Units/kg/hr IntraVENous TITRATE    amLODIPine (NORVASC) tablet 10 mg  10 mg Oral DAILY    vancomycin (VANCOCIN) 1250 mg in  ml infusion  1,250 mg IntraVENous Q18H    HYDROcodone-acetaminophen (NORCO) 5-325 mg per tablet 1 Tablet  1 Tablet Oral Q4H PRN    insulin glargine (LANTUS) injection 30 Units  30 Units SubCUTAneous DAILY    sodium chloride (NS) flush 5-10 mL  5-10 mL IntraVENous PRN    ezetimibe (ZETIA) tablet 10 mg  10 mg Oral DAILY    famotidine (PEPCID) tablet 20 mg  20 mg Oral BID    fluticasone propionate (FLONASE) 50 mcg/actuation nasal spray 2 Spray  2 Spray Both Nostrils DAILY    nitroglycerin (NITROSTAT) tablet 0.4 mg  0.4 mg SubLINGual PRN    predniSONE (DELTASONE) tablet 5 mg  5 mg Oral BID WITH MEALS    sotaloL (BETAPACE) tablet 40 mg  40 mg Oral BID    sodium chloride (NS) flush 5-40 mL  5-40 mL IntraVENous Q8H    sodium chloride (NS) flush 5-40 mL  5-40 mL IntraVENous PRN    acetaminophen (TYLENOL) tablet 650 mg  650 mg Oral Q6H PRN    Or    acetaminophen (TYLENOL) suppository 650 mg 650 mg Rectal Q6H PRN    polyethylene glycol (MIRALAX) packet 17 g  17 g Oral DAILY PRN    ondansetron (ZOFRAN ODT) tablet 4 mg  4 mg Oral Q8H PRN    Or    ondansetron (ZOFRAN) injection 4 mg  4 mg IntraVENous Q6H PRN    traZODone (DESYREL) tablet 75 mg  75 mg Oral QHS    losartan (COZAAR) tablet 100 mg  100 mg Oral DAILY    cefepime (MAXIPIME) 1 g in 0.9% sodium chloride (MBP/ADV) 50 mL MBP  1 g IntraVENous Q12H    dextrose 40% (GLUTOSE) oral gel 1 Tube  15 g Oral PRN    glucagon (GLUCAGEN) injection 1 mg  1 mg IntraMUSCular PRN    dextrose (D50W) injection syrg 12.5-25 g  25-50 mL IntraVENous PRN    insulin lispro (HUMALOG) injection   SubCUTAneous AC&HS       Problem List:  Hospital Problems as of 10/8/2021 Date Reviewed: 10/5/2021        Codes Class Noted - Resolved POA    * (Principal) Abscess of left foot ICD-10-CM: A00.794  ICD-9-CM: 682.7  10/5/2021 - Present Unknown        DM type 2 (diabetes mellitus, type 2) ([facility] ICD-10-CM: E11.9  ICD-9-CM: 250.00  10/5/2021 - Present Unknown        HTN (hypertension) ICD-10-CM: I10  ICD-9-CM: 401.9  10/5/2021 - Present Unknown        Sleep apnea ICD-10-CM: G47.30  ICD-9-CM: 780.57  10/5/2021 - Present Unknown        COPD (chronic obstructive pulmonary disease) ([facility] ICD-10-CM: J44.9  ICD-9-CM: 861  10/5/2021 - Present Unknown        Polymyalgia rheumatica ([facility] ICD-10-CM: M35.3  ICD-9-CM: 160  10/5/2021 - Present Unknown        Hyperlipidemia ICD-10-CM: E78.5  ICD-9-CM: 272.4  10/5/2021 - Present Unknown        Diabetic neuropathy associated with type 2 diabetes mellitus ([facility] ICD-10-CM: E11.40  ICD-9-CM: 250.60, 357.2  10/5/2021 - Present Unknown        Class 1 obesity in adult ICD-10-CM: E66.9  ICD-9-CM: 278.00  10/5/2021 - Present Unknown        CAD (coronary artery disease) ICD-10-CM: I25.10  ICD-9-CM: 414.00  10/5/2021 - Present Unknown        H/O cardiac pacemaker ICD-10-CM: Z95.0  ICD-9-CM: V12.50, V45.01  10/5/2021 - Present Unknown Signed By: Jamila Candelario MD   [facility] Hospitalist Service    October 8, 2021  3:31 PM

## 2021-10-09 ENCOUNTER — ANESTHESIA EVENT (OUTPATIENT)
Dept: SURGERY | Age: 76
DRG: 617 | End: 2021-10-09
Payer: MEDICARE

## 2021-10-09 LAB
ANION GAP SERPL CALC-SCNC: 6 MMOL/L (ref 7–16)
BACTERIA SPEC CULT: ABNORMAL
BASOPHILS # BLD: 0.1 K/UL (ref 0–0.2)
BASOPHILS NFR BLD: 1 % (ref 0–2)
BUN SERPL-MCNC: 16 MG/DL (ref 8–23)
CALCIUM SERPL-MCNC: 8.9 MG/DL (ref 8.3–10.4)
CHLORIDE SERPL-SCNC: 106 MMOL/L (ref 98–107)
CO2 SERPL-SCNC: 27 MMOL/L (ref 21–32)
CREAT SERPL-MCNC: 1.13 MG/DL (ref 0.8–1.5)
DIFFERENTIAL METHOD BLD: ABNORMAL
EOSINOPHIL # BLD: 0.1 K/UL (ref 0–0.8)
EOSINOPHIL NFR BLD: 2 % (ref 0.5–7.8)
ERYTHROCYTE [DISTWIDTH] IN BLOOD BY AUTOMATED COUNT: 12 % (ref 11.9–14.6)
GLUCOSE BLD STRIP.AUTO-MCNC: 100 MG/DL (ref 65–100)
GLUCOSE BLD STRIP.AUTO-MCNC: 145 MG/DL (ref 65–100)
GLUCOSE BLD STRIP.AUTO-MCNC: 154 MG/DL (ref 65–100)
GLUCOSE BLD STRIP.AUTO-MCNC: 236 MG/DL (ref 65–100)
GLUCOSE SERPL-MCNC: 126 MG/DL (ref 65–100)
GRAM STN SPEC: ABNORMAL
GRAM STN SPEC: ABNORMAL
HCT VFR BLD AUTO: 37.4 % (ref 41.1–50.3)
HGB BLD-MCNC: 12.3 G/DL (ref 13.6–17.2)
IMM GRANULOCYTES # BLD AUTO: 0 K/UL (ref 0–0.5)
IMM GRANULOCYTES NFR BLD AUTO: 0 % (ref 0–5)
LYMPHOCYTES # BLD: 1.4 K/UL (ref 0.5–4.6)
LYMPHOCYTES NFR BLD: 18 % (ref 13–44)
MCH RBC QN AUTO: 31 PG (ref 26.1–32.9)
MCHC RBC AUTO-ENTMCNC: 32.9 G/DL (ref 31.4–35)
MCV RBC AUTO: 94.2 FL (ref 79.6–97.8)
MONOCYTES # BLD: 0.6 K/UL (ref 0.1–1.3)
MONOCYTES NFR BLD: 8 % (ref 4–12)
NEUTS SEG # BLD: 5.5 K/UL (ref 1.7–8.2)
NEUTS SEG NFR BLD: 71 % (ref 43–78)
NRBC # BLD: 0 K/UL (ref 0–0.2)
PLATELET # BLD AUTO: 401 K/UL (ref 150–450)
PMV BLD AUTO: 9.3 FL (ref 9.4–12.3)
POTASSIUM SERPL-SCNC: 4.1 MMOL/L (ref 3.5–5.1)
RBC # BLD AUTO: 3.97 M/UL (ref 4.23–5.6)
SERVICE CMNT-IMP: ABNORMAL
SERVICE CMNT-IMP: NORMAL
SODIUM SERPL-SCNC: 139 MMOL/L (ref 138–145)
UFH PPP CHRO-ACNC: 0.16 IU/ML (ref 0.3–0.7)
UFH PPP CHRO-ACNC: 0.27 IU/ML (ref 0.3–0.7)
WBC # BLD AUTO: 7.7 K/UL (ref 4.3–11.1)

## 2021-10-09 PROCEDURE — 74011250637 HC RX REV CODE- 250/637: Performed by: FAMILY MEDICINE

## 2021-10-09 PROCEDURE — 74011250636 HC RX REV CODE- 250/636: Performed by: INTERNAL MEDICINE

## 2021-10-09 PROCEDURE — 36415 COLL VENOUS BLD VENIPUNCTURE: CPT

## 2021-10-09 PROCEDURE — 85025 COMPLETE CBC W/AUTO DIFF WBC: CPT

## 2021-10-09 PROCEDURE — 85520 HEPARIN ASSAY: CPT

## 2021-10-09 PROCEDURE — 74011636637 HC RX REV CODE- 636/637: Performed by: NURSE PRACTITIONER

## 2021-10-09 PROCEDURE — 65660000000 HC RM CCU STEPDOWN

## 2021-10-09 PROCEDURE — 2709999900 HC NON-CHARGEABLE SUPPLY

## 2021-10-09 PROCEDURE — 74011000258 HC RX REV CODE- 258: Performed by: FAMILY MEDICINE

## 2021-10-09 PROCEDURE — 80048 BASIC METABOLIC PNL TOTAL CA: CPT

## 2021-10-09 PROCEDURE — 74011250636 HC RX REV CODE- 250/636: Performed by: FAMILY MEDICINE

## 2021-10-09 PROCEDURE — 82962 GLUCOSE BLOOD TEST: CPT

## 2021-10-09 PROCEDURE — 74011636637 HC RX REV CODE- 636/637: Performed by: FAMILY MEDICINE

## 2021-10-09 PROCEDURE — 74011250637 HC RX REV CODE- 250/637: Performed by: NURSE PRACTITIONER

## 2021-10-09 RX ORDER — HEPARIN SODIUM 5000 [USP'U]/ML
20 INJECTION, SOLUTION INTRAVENOUS; SUBCUTANEOUS ONCE
Status: COMPLETED | OUTPATIENT
Start: 2021-10-09 | End: 2021-10-09

## 2021-10-09 RX ORDER — VANCOMYCIN HYDROCHLORIDE
1250
Status: DISCONTINUED | OUTPATIENT
Start: 2021-10-09 | End: 2021-10-11

## 2021-10-09 RX ADMIN — Medication 10 ML: at 16:36

## 2021-10-09 RX ADMIN — SOTALOL HYDROCHLORIDE 40 MG: 80 TABLET ORAL at 08:46

## 2021-10-09 RX ADMIN — SOTALOL HYDROCHLORIDE 40 MG: 80 TABLET ORAL at 18:50

## 2021-10-09 RX ADMIN — Medication 10 ML: at 22:50

## 2021-10-09 RX ADMIN — CEFEPIME HYDROCHLORIDE 1 G: 1 INJECTION, POWDER, FOR SOLUTION INTRAMUSCULAR; INTRAVENOUS at 18:41

## 2021-10-09 RX ADMIN — VANCOMYCIN HYDROCHLORIDE 1250 MG: 10 INJECTION, POWDER, LYOPHILIZED, FOR SOLUTION INTRAVENOUS at 19:24

## 2021-10-09 RX ADMIN — PREDNISONE 5 MG: 10 TABLET ORAL at 08:46

## 2021-10-09 RX ADMIN — INSULIN GLARGINE 30 UNITS: 100 INJECTION, SOLUTION SUBCUTANEOUS at 09:13

## 2021-10-09 RX ADMIN — HEPARIN SODIUM 2450 UNITS: 5000 INJECTION INTRAVENOUS; SUBCUTANEOUS at 11:52

## 2021-10-09 RX ADMIN — AMLODIPINE BESYLATE 10 MG: 10 TABLET ORAL at 08:46

## 2021-10-09 RX ADMIN — VANCOMYCIN HYDROCHLORIDE 1250 MG: 10 INJECTION, POWDER, LYOPHILIZED, FOR SOLUTION INTRAVENOUS at 00:45

## 2021-10-09 RX ADMIN — FLUTICASONE PROPIONATE 2 SPRAY: 50 SPRAY, METERED NASAL at 09:00

## 2021-10-09 RX ADMIN — TRAZODONE HYDROCHLORIDE 75 MG: 50 TABLET ORAL at 20:44

## 2021-10-09 RX ADMIN — INSULIN LISPRO 3 UNITS: 100 INJECTION, SOLUTION INTRAVENOUS; SUBCUTANEOUS at 08:45

## 2021-10-09 RX ADMIN — CEFEPIME HYDROCHLORIDE 1 G: 1 INJECTION, POWDER, FOR SOLUTION INTRAMUSCULAR; INTRAVENOUS at 06:52

## 2021-10-09 RX ADMIN — INSULIN LISPRO 6 UNITS: 100 INJECTION, SOLUTION INTRAVENOUS; SUBCUTANEOUS at 18:41

## 2021-10-09 RX ADMIN — FAMOTIDINE 20 MG: 20 TABLET ORAL at 18:41

## 2021-10-09 RX ADMIN — HEPARIN SODIUM 2000 UNITS: 5000 INJECTION INTRAVENOUS; SUBCUTANEOUS at 20:39

## 2021-10-09 RX ADMIN — LOSARTAN POTASSIUM 100 MG: 50 TABLET, FILM COATED ORAL at 08:46

## 2021-10-09 RX ADMIN — EZETIMIBE 10 MG: 10 TABLET ORAL at 08:46

## 2021-10-09 RX ADMIN — FAMOTIDINE 20 MG: 20 TABLET ORAL at 08:46

## 2021-10-09 RX ADMIN — PREDNISONE 5 MG: 10 TABLET ORAL at 20:41

## 2021-10-09 RX ADMIN — HEPARIN SODIUM 14 UNITS/KG/HR: 5000 INJECTION, SOLUTION INTRAVENOUS at 11:17

## 2021-10-09 RX ADMIN — Medication 10 ML: at 07:45

## 2021-10-09 NOTE — PROGRESS NOTES
Xa came back at 0.16 so Heparin drip to increase by 2 with a bolus per protocol. See MAR. Next Xa due at 1730 today and has been ordered.

## 2021-10-09 NOTE — PROGRESS NOTES
Problem: Falls - Risk of  Goal: *Absence of Falls  Description: Document Gilmar Hillman Fall Risk and appropriate interventions in the flowsheet. Outcome: Progressing Towards Goal  Note: Fall Risk Interventions:  Mobility Interventions: Patient to call before getting OOB         Medication Interventions: Patient to call before getting OOB, Teach patient to arise slowly                   Problem: Diabetes Self-Management  Goal: *Disease process and treatment process  Description: Define diabetes and identify own type of diabetes; list 3 options for treating diabetes. Outcome: Progressing Towards Goal  Goal: *Incorporating nutritional management into lifestyle  Description: Describe effect of type, amount and timing of food on blood glucose; list 3 methods for planning meals. Outcome: Progressing Towards Goal  Goal: *Incorporating physical activity into lifestyle  Description: State effect of exercise on blood glucose levels. Outcome: Progressing Towards Goal  Goal: *Developing strategies to promote health/change behavior  Description: Define the ABC's of diabetes; identify appropriate screenings, schedule and personal plan for screenings. Outcome: Progressing Towards Goal  Goal: *Using medications safely  Description: State effect of diabetes medications on diabetes; name diabetes medication taking, action and side effects.   Outcome: Progressing Towards Goal

## 2021-10-09 NOTE — PROGRESS NOTES
ORTHO PROGRESS NOTE    2021  Admit Date:   10/5/2021    Post Op day: 3 Days Post-Op    Subjective:    Randi [address]Lal[address] IN BED     PT/OT:   Gait:                    Vital Signs:    Patient Vitals for the past 8 hrs:   BP Temp Pulse Resp SpO2 Weight   10/09/21 0633 -- -- -- -- -- 272 lb 3.2 oz (123.5 kg)   10/09/21 0501 139/72 97.9 °F (36.6 °C) 70 18 98 % --     Temp (24hrs), Av.1 °F (36.7 °C), Min:97.9 °F (36.6 °C), Max:98.2 °F (36.8 °C)      Pain Control:   Pain Assessment  Pain Scale 1: Numeric (0 - 10)  Pain Intensity 1: 4  Pain Onset 1: ongoing  Pain Location 1: Foot  Pain Orientation 1: Left  Pain Description 1: Sharp  Pain Intervention(s) 1: Medication (see MAR)    Meds:    Current Facility-Administered Medications   Medication Dose Route Frequency    heparin 25,000 units in dextrose 500 mL infusion  12-25 Units/kg/hr (Adjusted) IntraVENous TITRATE    amLODIPine (NORVASC) tablet 10 mg  10 mg Oral DAILY    vancomycin (VANCOCIN) 1250 mg in  ml infusion  1,250 mg IntraVENous Q18H    HYDROcodone-acetaminophen (NORCO) 5-325 mg per tablet 1 Tablet  1 Tablet Oral Q4H PRN    insulin glargine (LANTUS) injection 30 Units  30 Units SubCUTAneous DAILY    sodium chloride (NS) flush 5-10 mL  5-10 mL IntraVENous PRN    ezetimibe (ZETIA) tablet 10 mg  10 mg Oral DAILY    famotidine (PEPCID) tablet 20 mg  20 mg Oral BID    fluticasone propionate (FLONASE) 50 mcg/actuation nasal spray 2 Spray  2 Spray Both Nostrils DAILY    nitroglycerin (NITROSTAT) tablet 0.4 mg  0.4 mg SubLINGual PRN    predniSONE (DELTASONE) tablet 5 mg  5 mg Oral BID WITH MEALS    sotaloL (BETAPACE) tablet 40 mg  40 mg Oral BID    sodium chloride (NS) flush 5-40 mL  5-40 mL IntraVENous Q8H    sodium chloride (NS) flush 5-40 mL  5-40 mL IntraVENous PRN    acetaminophen (TYLENOL) tablet 650 mg  650 mg Oral Q6H PRN    Or    acetaminophen (TYLENOL) suppository 650 mg  650 mg Rectal Q6H PRN    polyethylene glycol (MIRALAX) packet 17 g  17 g Oral DAILY PRN    ondansetron (ZOFRAN ODT) tablet 4 mg  4 mg Oral Q8H PRN    Or    ondansetron (ZOFRAN) injection 4 mg  4 mg IntraVENous Q6H PRN    traZODone (DESYREL) tablet 75 mg  75 mg Oral QHS    losartan (COZAAR) tablet 100 mg  100 mg Oral DAILY    cefepime (MAXIPIME) 1 g in 0.9% sodium chloride (MBP/ADV) 50 mL MBP  1 g IntraVENous Q12H    dextrose 40% (GLUTOSE) oral gel 1 Tube  15 g Oral PRN    glucagon (GLUCAGEN) injection 1 mg  1 mg IntraMUSCular PRN    dextrose (D50W) injection syrg 12.5-25 g  25-50 mL IntraVENous PRN    insulin lispro (HUMALOG) injection   SubCUTAneous AC&HS       LAB:    Recent Labs     10/09/21  0352   HCT 37.4*   HGB 12.3*       24 Hour Assessment Issues:    Oriented    Discharge Planning: HOME VS SNF    Transfuse PRBC's:      Assessment & Physician's Comment:  Dressing is clean, dry, and intact  Neurovascular checks within normal limits    Principal Problem:    Abscess of left foot (10/5/2021)    Active Problems:    DM type 2 (diabetes mellitus, type 2) ([facility] (10/5/2021)      HTN (hypertension) (10/5/2021)      Sleep apnea (10/5/2021)      COPD (chronic obstructive pulmonary disease) ([facility] (10/5/2021)      Polymyalgia rheumatica ([facility] (10/5/2021)      Hyperlipidemia (10/5/2021)      Diabetic neuropathy associated with type 2 diabetes mellitus ([facility] (10/5/2021)      Class 1 obesity in adult (10/5/2021)      CAD (coronary artery disease) (10/5/2021)      H/O cardiac pacemaker (10/5/2021)        Plan:  CONTINUE PLAN OF CARE   SURGERY WITH DR GUPTA Sunday OR Monday        Kt Bui NP

## 2021-10-09 NOTE — PROGRESS NOTES
Assumed care of the patient. Off going report given by [facility] RN. The patient is AO x 4 at this time and is resting in bed. IV access: PIV infusing heparin qtt (verified) and IV abx. Oxygen needs: Stable on room air. Pain assessment: NAD  NPWT to L foot patent on continuous at 125. Tele in place. Safety: Bed is low and call light is within reach. Patient understands to call for assistance.

## 2021-10-09 NOTE — PROGRESS NOTES
Patient Alert and oriented X 4. Pt is on room air,  VSS. Pt transfers with one assist to [facility]. Vancomycin delayed due to loosing IV access. ICU RN placed ultrasound guided PIV, intravenous ABX resumed. Heparin drip infusing at 12 units/kg/hr and did not require rate adjustment. Wound vac to left foot had 10 ml output. Hourly rounds completed. Bed in lowest position, call light and possessions within reach. Report given to oncoming nurse.

## 2021-10-09 NOTE — PROGRESS NOTES
[facility]ist Progress Note     Name:  Meena Prater  Age:76 y.o. Sex:male   :  1945    MRN:  [id number]     Admit Date:  10/5/2021    Reason for Admission:  Abscess of left foot [L02.612]    Assessment & Plan     -Left plantar foot ulcer with dorsal abscess   10/6/2021  s/pPartial excision first metatarsal head with bone biopsy   Debridement of the left foot abscess  Patient presently has wound VAC  Initial wound culture in ER-positive for Klebsiella oxytoca, E. coli, staph aureus, Enterococcus  Repeat wound culture postop showed gram-negative rods  Patient presently on Vanco and Zosyn  109/  For surgery monday    -History of A. fib on Xarelto/history of pacemaker  Presently Xarelto held  Started on heparin drip  Continue sotalol  10/9/2021  Cont heparin drip    -Diabetes mellitus type 2/diabetic neuropathy bilateral feet  On Tresiba at home  Continue on sliding scale and Lantus    -Hyperlipidemia  Continue Zetia    -Hypertension  Continue amlodipine and losartan    -Sleep apnea  Continue CPAP    -History of polymyalgia rheumatica  Continue prednisone 5 mg twice daily    -COPD  As needed nebs    -Obesity, BMI of 33.12    Diet:  DIET ADULT  DVT PPx:heparin drip  Code: Full Code        Hospital Course/Subjective:   Meena Prater is a 77 y.o. male with medical history of chronic left foot diabetic ulcer, polymyalgia rheumatica on prednisone, A. fib on sotalol, s/p pacemaker, COPD, diabetes mellitus type 2 on Tresiba, hypertension only on losartan/amlodipine, hyperlipidemia, obesity, A. fib on Xarelto, coronary disease with multiple stents acid reflux, diabetic neuropathy and obstructive sleep apnea on CPAP    Admitted for left plantar foot ulcer and dorsal abscess  S/p partial excision first metatarsal head with bone biopsy and debridement of the left foot abscess on 10/6/2021.   Ortho planning for transmetatarsal/midfoot amputation either on  or Monday. Subjective/24 hr Events (10/09/21):  10/8/2021  In bed, no complaints, has wound VAC to the left foot, has neuropathy, no pain. 10/9/2021  No complaints  Wound VAC to the left foot  As per patient surgery on Monday  On heparin drip since 10/8/2021  . Review of Systems: 14 point review of systems is otherwise negative with the exception of the elements mentioned above. Objective:     Patient Vitals for the past 24 hrs:   Temp Pulse Resp BP SpO2   10/09/21 0730 98.6 °F (37 °C) 70 18 (!) 142/76 96 %   10/09/21 0501 97.9 °F (36.6 °C) 70 18 139/72 98 %   10/08/21 2059 98.2 °F (36.8 °C) 72 18 (!) 146/69 95 %   10/08/21 2015 98.2 °F (36.8 °C) 72 18 (!) 146/69 95 %   10/08/21 1605 97.9 °F (36.6 °C) 70 18 (!) 141/69 95 %   10/08/21 1218 98.2 °F (36.8 °C) 70 18 (!) 175/83 97 %     Oxygen Therapy  O2 Sat (%): 96 % (10/09/21 0730)  Pulse via Oximetry: 71 beats per minute (10/06/21 1557)  O2 Device: None (Room air) (10/08/21 2015)  Skin Assessment: Clean, dry, & intact (10/08/21 2015)  O2 Flow Rate (L/min): 3 l/min (10/06/21 1526)    Body mass index is 34.02 kg/m². Physical Exam:   General:     alert, awake, no acute distress. Well nourished  HENT:   normocephalic, atraumatic. Oropharynx clear with moist mucous membranes and normal hard/soft palate  Eyes:   anicteric sclerae, moist conjunctiva, PERRL, EOMI  Neck:    supple, non-tender. Trachea midline. Lungs:   CTAB, no wheezing, rhonchi, rales  Cardiac:   RRR, Normal S1 and S2. No S3, S4 or murmurs. Abdomen:   Soft, non distended, nontender, +BS, no guarding/rebound. Obese, BMI 33.12  Extremities:   Warm, dry. No edema , pedal pulses present, no digital cyanosis  Skin:   Dressing to the left foot, wound VAC in place  Neuro:   AAOx3.  No gross focal neurological deficit,  Cranial nerves II-XII grossly intact  Neuropathy bilateral of both feet  Psychiatric:  No anxiety, calm, cooperative  On heparin drip      Data Review:  I have reviewed all labs, meds, and studies from the last 24 hours:    Labs:  Recent Results (from the past 24 hour(s))   GLUCOSE, POC    Collection Time: 10/08/21 12:12 PM   Result Value Ref Range    Glucose (POC) 100 65 - 100 mg/dL    Performed by Quintin Alonso, POC    Collection Time: 10/08/21  4:00 PM   Result Value Ref Range    Glucose (POC) 154 (H) 65 - 100 mg/dL    Performed by Lee Ann Mcdonald    PTT    Collection Time: 10/08/21  4:27 PM   Result Value Ref Range    aPTT 26.3 24.1 - 35.1 SEC   CBC WITH AUTOMATED DIFF    Collection Time: 10/08/21  4:27 PM   Result Value Ref Range    WBC 8.2 4.3 - 11.1 K/uL    RBC 4.12 (L) 4.23 - 5.6 M/uL    HGB 12.7 (L) 13.6 - 17.2 g/dL    HCT 39.5 (L) 41.1 - 50.3 %    MCV 95.9 79.6 - 97.8 FL    MCH 30.8 26.1 - 32.9 PG    MCHC 32.2 31.4 - 35.0 g/dL    RDW 12.2 11.9 - 14.6 %    PLATELET 354 622 - 500 K/uL    MPV 9.1 (L) 9.4 - 12.3 FL    ABSOLUTE NRBC 0.00 0.0 - 0.2 K/uL    DF AUTOMATED      NEUTROPHILS 76 43 - 78 %    LYMPHOCYTES 15 13 - 44 %    MONOCYTES 8 4.0 - 12.0 %    EOSINOPHILS 1 0.5 - 7.8 %    BASOPHILS 1 0.0 - 2.0 %    IMMATURE GRANULOCYTES 1 0.0 - 5.0 %    ABS. NEUTROPHILS 6.2 1.7 - 8.2 K/UL    ABS. LYMPHOCYTES 1.2 0.5 - 4.6 K/UL    ABS. MONOCYTES 0.6 0.1 - 1.3 K/UL    ABS. EOSINOPHILS 0.1 0.0 - 0.8 K/UL    ABS. BASOPHILS 0.0 0.0 - 0.2 K/UL    ABS. IMM.  GRANS. 0.0 0.0 - 0.5 K/UL   METABOLIC PANEL, BASIC    Collection Time: 10/08/21  4:27 PM   Result Value Ref Range    Sodium 139 136 - 145 mmol/L    Potassium 5.2 (H) 3.5 - 5.1 mmol/L    Chloride 106 98 - 107 mmol/L    CO2 30 21 - 32 mmol/L    Anion gap 3 (L) 7 - 16 mmol/L    Glucose 160 (H) 65 - 100 mg/dL    BUN 18 8 - 23 MG/DL    Creatinine 1.10 0.8 - 1.5 MG/DL    GFR est AA >60 >60 ml/min/1.73m2    GFR est non-AA >60 >60 ml/min/1.73m2    Calcium 9.1 8.3 - 10.4 MG/DL   GLUCOSE, POC    Collection Time: 10/08/21  8:15 PM   Result Value Ref Range    Glucose (POC) 197 (H) 65 - 100 mg/dL    Performed by Rudy Wheeler    HEPARIN XA UFH Collection Time: 10/08/21 10:46 PM   Result Value Ref Range    Heparin Xa UFH 0.37 0.3 - 0.7 IU/mL   CBC WITH AUTOMATED DIFF    Collection Time: 10/09/21  3:52 AM   Result Value Ref Range    WBC 7.7 4.3 - 11.1 K/uL    RBC 3.97 (L) 4.23 - 5.6 M/uL    HGB 12.3 (L) 13.6 - 17.2 g/dL    HCT 37.4 (L) 41.1 - 50.3 %    MCV 94.2 79.6 - 97.8 FL    MCH 31.0 26.1 - 32.9 PG    MCHC 32.9 31.4 - 35.0 g/dL    RDW 12.0 11.9 - 14.6 %    PLATELET 837 752 - 369 K/uL    MPV 9.3 (L) 9.4 - 12.3 FL    ABSOLUTE NRBC 0.00 0.0 - 0.2 K/uL    DF AUTOMATED      NEUTROPHILS 71 43 - 78 %    LYMPHOCYTES 18 13 - 44 %    MONOCYTES 8 4.0 - 12.0 %    EOSINOPHILS 2 0.5 - 7.8 %    BASOPHILS 1 0.0 - 2.0 %    IMMATURE GRANULOCYTES 0 0.0 - 5.0 %    ABS. NEUTROPHILS 5.5 1.7 - 8.2 K/UL    ABS. LYMPHOCYTES 1.4 0.5 - 4.6 K/UL    ABS. MONOCYTES 0.6 0.1 - 1.3 K/UL    ABS. EOSINOPHILS 0.1 0.0 - 0.8 K/UL    ABS. BASOPHILS 0.1 0.0 - 0.2 K/UL    ABS. IMM.  GRANS. 0.0 0.0 - 0.5 K/UL   METABOLIC PANEL, BASIC    Collection Time: 10/09/21  3:52 AM   Result Value Ref Range    Sodium 139 138 - 145 mmol/L    Potassium 4.1 3.5 - 5.1 mmol/L    Chloride 106 98 - 107 mmol/L    CO2 27 21 - 32 mmol/L    Anion gap 6 (L) 7 - 16 mmol/L    Glucose 126 (H) 65 - 100 mg/dL    BUN 16 8 - 23 MG/DL    Creatinine 1.13 0.8 - 1.5 MG/DL    GFR est AA >60 >60 ml/min/1.73m2    GFR est non-AA >60 >60 ml/min/1.73m2    Calcium 8.9 8.3 - 10.4 MG/DL   GLUCOSE, POC    Collection Time: 10/09/21  7:48 AM   Result Value Ref Range    Glucose (POC) 154 (H) 65 - 100 mg/dL    Performed by [facility]        All Micro Results     Procedure Component Value Units Date/Time    [facility] [[id number]]  (Abnormal) Collected: 10/06/21 1444    Order Status: Completed Specimen: Foot, left Updated: 10/09/21 4504     Special Requests: NO SPECIAL REQUESTS        GRAM STAIN 5 TO 15 WBCS SEEN PER OIF      NO DEFINITE ORGANISM SEEN        Culture result:       LIGHT GRAM NEGATIVE RODS IDENTIFICATION AND SUSCEPTIBILITY TO FOLLOW          CULTURE, Jackie Ally STAIN [[id number]]  (Abnormal)  (Susceptibility) Collected: 10/05/21 1738    Order Status: Completed Specimen: Wound from Foot Updated: 10/09/21 0852     Special Requests: LEFT FOOT        GRAM STAIN 0 TO 6 WBC'S/OIF      FEW GRAM NEGATIVE RODS        Culture result:       MODERATE KLEBSIELLA OXYTOCA            MODERATE ESCHERICHIA COLI               STAPHYLOCOCCUS AUREUS ISOLATED FROM BROTH ONLY                  ENTEROCOCCUS FAECALIS GROUP D ISOLATED FROM BROTH ONLY          BLOOD CULTURE [[id number]] Collected: 10/05/21 1652    Order Status: Completed Specimen: Blood Updated: 10/09/21 0655     Special Requests: --        NO SPECIAL REQUESTS  LEFT  Antecubital       Culture result: NO GROWTH 4 DAYS       BLOOD CULTURE [[id number]] Collected: 10/05/21 1754    Order Status: Completed Specimen: Blood Updated: 10/09/21 0655     Special Requests: --        NO SPECIAL REQUESTS  RIGHT  Antecubital       Culture result: NO GROWTH 4 DAYS       CULTURE, Jackie Ally STAIN [[id number]]  (Abnormal) Collected: 10/06/21 1445    Order Status: Completed Specimen: Foot, left Updated: 10/08/21 0921     Special Requests: NO SPECIAL REQUESTS        GRAM STAIN 0 TO 3 WBCS SEEN PER OIF      NO DEFINITE ORGANISM SEEN        Culture result:       SCANT GRAM NEGATIVE RODS SUBCULTURE IN PROGRESS          CULTURE, Jackie Ally STAIN [[id number]]  (Abnormal) Collected: 10/06/21 1446    Order Status: Completed Specimen: Foot, left Updated: 10/08/21 0911     Special Requests: NO SPECIAL REQUESTS        GRAM STAIN 2 TO 20 WBCS SEEN PER OIF      NO DEFINITE ORGANISM SEEN        Culture result:       SCANT GRAM NEGATIVE RODS SUBCULTURE IN PROGRESS          CULTURE, ANAEROBIC [[id number]] Collected: 10/06/21 1445    Order Status: Completed Specimen: Foot, left Updated: 10/08/21 0841     Special Requests: NO SPECIAL REQUESTS        Culture result:       SUBCULTURE IS NECESSARY TO DETERMINE PRESENCE OR ABSENCE OF ANAEROBIC BACTERIA IN THIS CULTURE. FURTHER REPORT TO FOLLOW AFTER INCUBATION OF SUBCULTURE. CULTURE, ANAEROBIC [[id number]] Collected: 10/06/21 1446    Order Status: Completed Specimen: Foot, left Updated: 10/08/21 0841     Special Requests: NO SPECIAL REQUESTS        Culture result:       SUBCULTURE IS NECESSARY TO DETERMINE PRESENCE OR ABSENCE OF ANAEROBIC BACTERIA IN THIS CULTURE. FURTHER REPORT TO FOLLOW AFTER INCUBATION OF SUBCULTURE. CULTURE, ANAEROBIC [[id number]] Collected: 10/06/21 1444    Order Status: Completed Specimen: Foot, left Updated: 10/08/21 0841     Special Requests: NO SPECIAL REQUESTS        Culture result:       SUBCULTURE IS NECESSARY TO DETERMINE PRESENCE OR ABSENCE OF ANAEROBIC BACTERIA IN THIS CULTURE. FURTHER REPORT TO FOLLOW AFTER INCUBATION OF SUBCULTURE. Mitzy Birch STAIN [[id number]] Collected: 10/06/21 1500    Order Status: Canceled Specimen: Wound Drainage     GRAM STAIN [[id number]] Collected: 10/06/21 1500    Order Status: Canceled Specimen: Wound     GRAM STAIN [[id number]] Collected: 10/06/21 1500    Order Status: Canceled Specimen: Wound           EKG Results     Procedure 720 Value Units Date/Time    EKG, 12 LEAD, INITIAL [[id number]] Collected: 10/05/21 1658    Order Status: Completed Updated: 10/06/21 7693     Ventricular Rate 73 BPM      Atrial Rate 73 BPM      P-R Interval 138 ms      QRS Duration 84 ms      Q-T Interval 400 ms      QTC Calculation (Bezet) 440 ms      Calculated P Axis 74 degrees      Calculated R Axis 69 degrees      Calculated T Axis 70 degrees      Diagnosis --     Normal sinus rhythm  Normal ECG  No previous ECGs available  Confirmed by Modesta Perry MD (), Monique Aguilera ([id number]) on 10/6/2021 6:32:25 AM            Other Studies:  No results found.     Current Meds:   Current Facility-Administered Medications   Medication Dose Route Frequency    heparin 25,000 units in dextrose 500 mL infusion  12-25 Units/kg/hr (Adjusted) IntraVENous TITRATE    amLODIPine (NORVASC) tablet 10 mg  10 mg Oral DAILY    vancomycin (VANCOCIN) 1250 mg in  ml infusion  1,250 mg IntraVENous Q18H    HYDROcodone-acetaminophen (NORCO) 5-325 mg per tablet 1 Tablet  1 Tablet Oral Q4H PRN    insulin glargine (LANTUS) injection 30 Units  30 Units SubCUTAneous DAILY    sodium chloride (NS) flush 5-10 mL  5-10 mL IntraVENous PRN    ezetimibe (ZETIA) tablet 10 mg  10 mg Oral DAILY    famotidine (PEPCID) tablet 20 mg  20 mg Oral BID    fluticasone propionate (FLONASE) 50 mcg/actuation nasal spray 2 Spray  2 Spray Both Nostrils DAILY    nitroglycerin (NITROSTAT) tablet 0.4 mg  0.4 mg SubLINGual PRN    predniSONE (DELTASONE) tablet 5 mg  5 mg Oral BID WITH MEALS    sotaloL (BETAPACE) tablet 40 mg  40 mg Oral BID    sodium chloride (NS) flush 5-40 mL  5-40 mL IntraVENous Q8H    sodium chloride (NS) flush 5-40 mL  5-40 mL IntraVENous PRN    acetaminophen (TYLENOL) tablet 650 mg  650 mg Oral Q6H PRN    Or    acetaminophen (TYLENOL) suppository 650 mg  650 mg Rectal Q6H PRN    polyethylene glycol (MIRALAX) packet 17 g  17 g Oral DAILY PRN    ondansetron (ZOFRAN ODT) tablet 4 mg  4 mg Oral Q8H PRN    Or    ondansetron (ZOFRAN) injection 4 mg  4 mg IntraVENous Q6H PRN    traZODone (DESYREL) tablet 75 mg  75 mg Oral QHS    losartan (COZAAR) tablet 100 mg  100 mg Oral DAILY    cefepime (MAXIPIME) 1 g in 0.9% sodium chloride (MBP/ADV) 50 mL MBP  1 g IntraVENous Q12H    dextrose 40% (GLUTOSE) oral gel 1 Tube  15 g Oral PRN    glucagon (GLUCAGEN) injection 1 mg  1 mg IntraMUSCular PRN    dextrose (D50W) injection syrg 12.5-25 g  25-50 mL IntraVENous PRN    insulin lispro (HUMALOG) injection   SubCUTAneous AC&HS       Problem List:  Hospital Problems as of 10/9/2021 Date Reviewed: 10/5/2021        Codes Class Noted - Resolved POA    * (Principal) Abscess of left foot ICD-10-CM: Q08.086  ICD-9-CM: 682.7  10/5/2021 - Present Unknown        DM type 2 (diabetes mellitus, type 2) ([facility] ICD-10-CM: E11.9  ICD-9-CM: 250.00  10/5/2021 - Present Unknown        HTN (hypertension) ICD-10-CM: I10  ICD-9-CM: 401.9  10/5/2021 - Present Unknown        Sleep apnea ICD-10-CM: G47.30  ICD-9-CM: 780.57  10/5/2021 - Present Unknown        COPD (chronic obstructive pulmonary disease) ([facility] ICD-10-CM: J44.9  ICD-9-CM: 665  10/5/2021 - Present Unknown        Polymyalgia rheumatica ([facility] ICD-10-CM: M35.3  ICD-9-CM: 987  10/5/2021 - Present Unknown        Hyperlipidemia ICD-10-CM: E78.5  ICD-9-CM: 272.4  10/5/2021 - Present Unknown        Diabetic neuropathy associated with type 2 diabetes mellitus ([facility] ICD-10-CM: E11.40  ICD-9-CM: 250.60, 357.2  10/5/2021 - Present Unknown        Class 1 obesity in adult ICD-10-CM: E66.9  ICD-9-CM: 278.00  10/5/2021 - Present Unknown        CAD (coronary artery disease) ICD-10-CM: I25.10  ICD-9-CM: 414.00  10/5/2021 - Present Unknown        H/O cardiac pacemaker ICD-10-CM: Z95.0  ICD-9-CM: V12.50, V45.01  10/5/2021 - Present Unknown                 Signed By: Agustín Pal MD   [employer] [facility]    October 9, 2021  3:31 PM

## 2021-10-10 LAB
ANION GAP SERPL CALC-SCNC: 4 MMOL/L (ref 7–16)
BACTERIA SPEC CULT: NORMAL
BACTERIA SPEC CULT: NORMAL
BASOPHILS # BLD: 0.1 K/UL (ref 0–0.2)
BASOPHILS NFR BLD: 1 % (ref 0–2)
BUN SERPL-MCNC: 15 MG/DL (ref 8–23)
CALCIUM SERPL-MCNC: 8.5 MG/DL (ref 8.3–10.4)
CHLORIDE SERPL-SCNC: 106 MMOL/L (ref 98–107)
CO2 SERPL-SCNC: 26 MMOL/L (ref 21–32)
CREAT SERPL-MCNC: 1.07 MG/DL (ref 0.8–1.5)
DIFFERENTIAL METHOD BLD: ABNORMAL
EOSINOPHIL # BLD: 0.1 K/UL (ref 0–0.8)
EOSINOPHIL NFR BLD: 1 % (ref 0.5–7.8)
ERYTHROCYTE [DISTWIDTH] IN BLOOD BY AUTOMATED COUNT: 12 % (ref 11.9–14.6)
GLUCOSE BLD STRIP.AUTO-MCNC: 105 MG/DL (ref 65–100)
GLUCOSE BLD STRIP.AUTO-MCNC: 110 MG/DL (ref 65–100)
GLUCOSE BLD STRIP.AUTO-MCNC: 170 MG/DL (ref 65–100)
GLUCOSE SERPL-MCNC: 114 MG/DL (ref 65–100)
HCT VFR BLD AUTO: 36.9 % (ref 41.1–50.3)
HGB BLD-MCNC: 12.1 G/DL (ref 13.6–17.2)
IMM GRANULOCYTES # BLD AUTO: 0.1 K/UL (ref 0–0.5)
IMM GRANULOCYTES NFR BLD AUTO: 1 % (ref 0–5)
LYMPHOCYTES # BLD: 1 K/UL (ref 0.5–4.6)
LYMPHOCYTES NFR BLD: 13 % (ref 13–44)
MCH RBC QN AUTO: 30.6 PG (ref 26.1–32.9)
MCHC RBC AUTO-ENTMCNC: 32.8 G/DL (ref 31.4–35)
MCV RBC AUTO: 93.2 FL (ref 79.6–97.8)
MONOCYTES # BLD: 0.6 K/UL (ref 0.1–1.3)
MONOCYTES NFR BLD: 7 % (ref 4–12)
NEUTS SEG # BLD: 6.4 K/UL (ref 1.7–8.2)
NEUTS SEG NFR BLD: 78 % (ref 43–78)
NRBC # BLD: 0 K/UL (ref 0–0.2)
PLATELET # BLD AUTO: 400 K/UL (ref 150–450)
PMV BLD AUTO: 9.1 FL (ref 9.4–12.3)
POTASSIUM SERPL-SCNC: 4.2 MMOL/L (ref 3.5–5.1)
RBC # BLD AUTO: 3.96 M/UL (ref 4.23–5.6)
SERVICE CMNT-IMP: ABNORMAL
SERVICE CMNT-IMP: NORMAL
SERVICE CMNT-IMP: NORMAL
SODIUM SERPL-SCNC: 136 MMOL/L (ref 136–145)
UFH PPP CHRO-ACNC: 0.17 IU/ML (ref 0.3–0.7)
UFH PPP CHRO-ACNC: 0.34 IU/ML (ref 0.3–0.7)
UFH PPP CHRO-ACNC: 0.39 IU/ML (ref 0.3–0.7)
WBC # BLD AUTO: 8.2 K/UL (ref 4.3–11.1)

## 2021-10-10 PROCEDURE — 74011250637 HC RX REV CODE- 250/637: Performed by: NURSE PRACTITIONER

## 2021-10-10 PROCEDURE — 85520 HEPARIN ASSAY: CPT

## 2021-10-10 PROCEDURE — 85025 COMPLETE CBC W/AUTO DIFF WBC: CPT

## 2021-10-10 PROCEDURE — 74011250637 HC RX REV CODE- 250/637: Performed by: FAMILY MEDICINE

## 2021-10-10 PROCEDURE — 74011250636 HC RX REV CODE- 250/636: Performed by: FAMILY MEDICINE

## 2021-10-10 PROCEDURE — 36415 COLL VENOUS BLD VENIPUNCTURE: CPT

## 2021-10-10 PROCEDURE — 74011636637 HC RX REV CODE- 636/637: Performed by: FAMILY MEDICINE

## 2021-10-10 PROCEDURE — 74011000258 HC RX REV CODE- 258: Performed by: FAMILY MEDICINE

## 2021-10-10 PROCEDURE — 65660000000 HC RM CCU STEPDOWN

## 2021-10-10 PROCEDURE — 80048 BASIC METABOLIC PNL TOTAL CA: CPT

## 2021-10-10 PROCEDURE — 82962 GLUCOSE BLOOD TEST: CPT

## 2021-10-10 PROCEDURE — 74011636637 HC RX REV CODE- 636/637: Performed by: NURSE PRACTITIONER

## 2021-10-10 RX ORDER — HEPARIN SODIUM 5000 [USP'U]/ML
20 INJECTION, SOLUTION INTRAVENOUS; SUBCUTANEOUS ONCE
Status: COMPLETED | OUTPATIENT
Start: 2021-10-10 | End: 2021-10-10

## 2021-10-10 RX ORDER — HEPARIN SODIUM 5000 [USP'U]/ML
40 INJECTION, SOLUTION INTRAVENOUS; SUBCUTANEOUS ONCE
Status: DISCONTINUED | OUTPATIENT
Start: 2021-10-10 | End: 2021-10-10

## 2021-10-10 RX ORDER — GUAIFENESIN 600 MG/1
1200 TABLET, EXTENDED RELEASE ORAL EVERY 12 HOURS
Status: DISCONTINUED | OUTPATIENT
Start: 2021-10-10 | End: 2021-10-13 | Stop reason: HOSPADM

## 2021-10-10 RX ADMIN — VANCOMYCIN HYDROCHLORIDE 1250 MG: 10 INJECTION, POWDER, LYOPHILIZED, FOR SOLUTION INTRAVENOUS at 13:17

## 2021-10-10 RX ADMIN — CEFEPIME HYDROCHLORIDE 1 G: 1 INJECTION, POWDER, FOR SOLUTION INTRAMUSCULAR; INTRAVENOUS at 05:04

## 2021-10-10 RX ADMIN — CEFEPIME HYDROCHLORIDE 1 G: 1 INJECTION, POWDER, FOR SOLUTION INTRAMUSCULAR; INTRAVENOUS at 17:02

## 2021-10-10 RX ADMIN — LOSARTAN POTASSIUM 100 MG: 50 TABLET, FILM COATED ORAL at 08:59

## 2021-10-10 RX ADMIN — AMLODIPINE BESYLATE 10 MG: 10 TABLET ORAL at 08:59

## 2021-10-10 RX ADMIN — Medication 10 ML: at 22:11

## 2021-10-10 RX ADMIN — FAMOTIDINE 20 MG: 20 TABLET ORAL at 08:59

## 2021-10-10 RX ADMIN — Medication 10 ML: at 14:42

## 2021-10-10 RX ADMIN — Medication 10 ML: at 22:00

## 2021-10-10 RX ADMIN — PREDNISONE 5 MG: 10 TABLET ORAL at 17:02

## 2021-10-10 RX ADMIN — HEPARIN SODIUM 16 UNITS/KG/HR: 5000 INJECTION, SOLUTION INTRAVENOUS at 04:19

## 2021-10-10 RX ADMIN — GUAIFENESIN 1200 MG: 600 TABLET ORAL at 22:10

## 2021-10-10 RX ADMIN — HEPARIN SODIUM 2450 UNITS: 5000 INJECTION INTRAVENOUS; SUBCUTANEOUS at 18:14

## 2021-10-10 RX ADMIN — FAMOTIDINE 20 MG: 20 TABLET ORAL at 17:03

## 2021-10-10 RX ADMIN — TRAZODONE HYDROCHLORIDE 75 MG: 50 TABLET ORAL at 22:10

## 2021-10-10 RX ADMIN — FLUTICASONE PROPIONATE 2 SPRAY: 50 SPRAY, METERED NASAL at 09:00

## 2021-10-10 RX ADMIN — PREDNISONE 5 MG: 10 TABLET ORAL at 08:59

## 2021-10-10 RX ADMIN — INSULIN LISPRO 3 UNITS: 100 INJECTION, SOLUTION INTRAVENOUS; SUBCUTANEOUS at 17:03

## 2021-10-10 RX ADMIN — SOTALOL HYDROCHLORIDE 40 MG: 80 TABLET ORAL at 09:00

## 2021-10-10 RX ADMIN — Medication 10 ML: at 05:44

## 2021-10-10 RX ADMIN — HEPARIN SODIUM 18 UNITS/KG/HR: 5000 INJECTION, SOLUTION INTRAVENOUS at 19:20

## 2021-10-10 RX ADMIN — GUAIFENESIN 1200 MG: 600 TABLET ORAL at 12:22

## 2021-10-10 RX ADMIN — EZETIMIBE 10 MG: 10 TABLET ORAL at 08:59

## 2021-10-10 RX ADMIN — SOTALOL HYDROCHLORIDE 40 MG: 80 TABLET ORAL at 17:03

## 2021-10-10 RX ADMIN — INSULIN GLARGINE 30 UNITS: 100 INJECTION, SOLUTION SUBCUTANEOUS at 09:13

## 2021-10-10 NOTE — PROGRESS NOTES
VANCO DAILY FOLLOW UP NOTE  [address] [facility] Pharmacokinetic Monitoring Service - Vancomycin    Consulting Provider: Ethan Hoffman MD   Indication: SSTI  Target Concentration: Goal trough of 10-15 mg/L and AUC/MARIA ELENA <500 mg*hr/L  Day of Therapy: 6, duration extended by Dr. Ethan Hoffman   Additional Antimicrobials: cefepime    Pertinent Laboratory Values: Wt Readings from Last 1 Encounters:   10/09/21 123.5 kg (272 lb 3.2 oz)     Temp Readings from Last 1 Encounters:   10/10/21 98.8 °F (37.1 °C)     No components found for: PROCAL  Recent Labs     10/10/21  0307 10/09/21  0352 10/08/21  1627   BUN 15 16 18   CREA 1.07 1.13 1.10   WBC 8.2 7.7 8.2     Estimated Creatinine Clearance: 84.5 mL/min (based on SCr of 1.07 mg/dL). Lab Results   Component Value Date/Time    Vancomycin, random 12.5 10/07/2021 04:13 AM     MRSA Nasal Swab: N/A. Non-respiratory infection. .    Assessment:  Date/Time Dose Concentration AUC   10/7 @ 0413 1500 mg q24 12.5 391   Note: Serum concentrations collected for AUC dosing may appear elevated if collected in close proximity to the dose administered, this is not necessarily an indication of toxicity    Plan:  Current dosing regimen is therapeutic  Continue current dose  Repeat vancomycin concentration ordered as clinically indicated  Pharmacy will continue to monitor patient and adjust therapy as indicated    Thank you for the consult,  CARL Maria  10/10/2021

## 2021-10-10 NOTE — PROGRESS NOTES
[facility]ist Progress Note     Name:  Marie Pearson  Age:76 y.o. Sex:male   :  1945    MRN:  [id number]     Admit Date:  10/5/2021    Reason for Admission:  Abscess of left foot [L02.612]    Assessment & Plan     -Left plantar foot ulcer with dorsal abscess   10/6/2021  s/pPartial excision first metatarsal head with bone biopsy   Debridement of the left foot abscess  Patient presently has wound VAC  Initial wound culture in ER-positive for Klebsiella oxytoca, E. coli, staph aureus, Enterococcus  Repeat wound culture postop showed gram-negative rods  Patient presently on Vanco and cefepime  10/10/2021  Continue heparin drip continue vancomycin and cefepime, patient for surgery tomorrow 10/11/2021. N.p.o. after midnight  We will consult ID tomorrow.      -History of A. fib on Xarelto/history of pacemaker  Presently Xarelto held  Started on heparin drip  Continue sotalol  10/10/2021  Cont heparin drip    -Diabetes mellitus type 2/diabetic neuropathy bilateral feet  On Tresiba at home  Continue on sliding scale and Lantus    -Hyperlipidemia  Continue Zetia    -Hypertension  Continue amlodipine and losartan    -Sleep apnea  Continue CPAP    -History of polymyalgia rheumatica  Continue prednisone 5 mg twice daily  10/10/2021  We will need a stress dose of Solu-Cortef prior to surgery tomorrow.     -COPD  As needed nebs    -Obesity, BMI of 33.12    Diet:  DIET ADULT  DIET NPO  DVT PPx:heparin drip  Code: Full Code        Hospital Course/Subjective:   Marie Pearson is a 77 y.o. male with medical history of chronic left foot diabetic ulcer, polymyalgia rheumatica on prednisone, A. fib on sotalol, s/p pacemaker, COPD, diabetes mellitus type 2 on Tresiba, hypertension only on losartan/amlodipine, hyperlipidemia, obesity, A. fib on Xarelto, coronary disease with multiple stents acid reflux, diabetic neuropathy and obstructive sleep apnea on CPAP    Admitted for left plantar foot ulcer and dorsal abscess  S/p partial excision first metatarsal head with bone biopsy and debridement of the left foot abscess on 10/6/2021. Ortho planning for transmetatarsal/midfoot amputation either on Sunday or Monday. Subjective/24 hr Events (10/10/21):  10/8/2021  In bed, no complaints, has wound VAC to the left foot, has neuropathy, no pain. 10/9/2021  No complaints  Wound VAC to the left foot  As per patient surgery on Monday  On heparin drip since 10/8/2021    10/10/2021  Says doing fine, mild cough. Says the nursing staff listen to his lung, felt that some crepitation over the left lung base, was concerned about it. Not requiring any oxygen, not in any respiratory distress. On broad-spectrum antibiotic with vancomycin and cefepime. .    Review of Systems: 14 point review of systems is otherwise negative with the exception of the elements mentioned above. Objective:     Patient Vitals for the past 24 hrs:   Temp Pulse Resp BP SpO2   10/10/21 0750 98.8 °F (37.1 °C) 87 18 127/63 92 %   10/10/21 0501 98.2 °F (36.8 °C) 74 20 (!) 150/66 94 %   10/10/21 0030 98.6 °F (37 °C) 71 18 (!) 142/62 95 %   10/09/21 2018 98.8 °F (37.1 °C) 70 18 -- 95 %   10/09/21 1645 98.5 °F (36.9 °C) 78 18 (!) 157/67 97 %   10/09/21 1210 98.1 °F (36.7 °C) 69 18 (!) 173/73 95 %     Oxygen Therapy  O2 Sat (%): 92 % (10/10/21 0750)  Pulse via Oximetry: 71 beats per minute (10/06/21 1557)  O2 Device: None (Room air) (10/09/21 1930)  Skin Assessment: Clean, dry, & intact (10/08/21 2015)  O2 Flow Rate (L/min): 3 l/min (10/06/21 1526)    Body mass index is 34.02 kg/m². Physical Exam:   General:     alert, awake, no acute distress. Well nourished  HENT:   normocephalic, atraumatic. Oropharynx clear with moist mucous membranes and normal hard/soft palate  Eyes:   anicteric sclerae, moist conjunctiva, PERRL, EOMI  Neck:    supple, non-tender. Trachea midline.    Lungs:   CTAB, no wheezing, rhonchi, rales  Cardiac:   RRR, Normal S1 and S2. No S3, S4 or murmurs. Abdomen:   Soft, non distended, nontender, +BS, no guarding/rebound. Obese, BMI 33.12  Extremities:   Warm, dry. No edema , pedal pulses present, no digital cyanosis  Skin:   Dressing to the left foot, wound VAC in place  Neuro:   AAOx3. No gross focal neurological deficit,  Cranial nerves II-XII grossly intact  Neuropathy bilateral of both feet  Psychiatric:  No anxiety, calm, cooperative  On heparin drip      Data Review:  I have reviewed all labs, meds, and studies from the last 24 hours:    Labs:  Recent Results (from the past 24 hour(s))   GLUCOSE, POC    Collection Time: 10/09/21 12:25 PM   Result Value Ref Range    Glucose (POC) 100 65 - 100 mg/dL    Performed by Comfort Cartagena    GLUCOSE, POC    Collection Time: 10/09/21  5:48 PM   Result Value Ref Range    Glucose (POC) 236 (H) 65 - 100 mg/dL    Performed by Comfort Cartagena    HEPARIN XA UFH    Collection Time: 10/09/21  5:55 PM   Result Value Ref Range    Heparin Xa UFH 0.27 (L) 0.3 - 0.7 IU/mL   GLUCOSE, POC    Collection Time: 10/09/21  8:36 PM   Result Value Ref Range    Glucose (POC) 145 (H) 65 - 100 mg/dL    [facility]    CBC WITH AUTOMATED DIFF    Collection Time: 10/10/21  3:07 AM   Result Value Ref Range    WBC 8.2 4.3 - 11.1 K/uL    RBC 3.96 (L) 4.23 - 5.6 M/uL    HGB 12.1 (L) 13.6 - 17.2 g/dL    HCT 36.9 (L) 41.1 - 50.3 %    MCV 93.2 79.6 - 97.8 FL    MCH 30.6 26.1 - 32.9 PG    MCHC 32.8 31.4 - 35.0 g/dL    RDW 12.0 11.9 - 14.6 %    PLATELET 580 560 - 984 K/uL    MPV 9.1 (L) 9.4 - 12.3 FL    ABSOLUTE NRBC 0.00 0.0 - 0.2 K/uL    DF AUTOMATED      NEUTROPHILS 78 43 - 78 %    LYMPHOCYTES 13 13 - 44 %    MONOCYTES 7 4.0 - 12.0 %    EOSINOPHILS 1 0.5 - 7.8 %    BASOPHILS 1 0.0 - 2.0 %    IMMATURE GRANULOCYTES 1 0.0 - 5.0 %    ABS. NEUTROPHILS 6.4 1.7 - 8.2 K/UL    ABS. LYMPHOCYTES 1.0 0.5 - 4.6 K/UL    ABS. MONOCYTES 0.6 0.1 - 1.3 K/UL    ABS. EOSINOPHILS 0.1 0.0 - 0.8 K/UL    ABS.  BASOPHILS 0.1 0.0 - 0.2 K/UL    ABS. IMM.  GRANS. 0.1 0.0 - 0.5 K/UL   METABOLIC PANEL, BASIC    Collection Time: 10/10/21  3:07 AM   Result Value Ref Range    Sodium 136 136 - 145 mmol/L    Potassium 4.2 3.5 - 5.1 mmol/L    Chloride 106 98 - 107 mmol/L    CO2 26 21 - 32 mmol/L    Anion gap 4 (L) 7 - 16 mmol/L    Glucose 114 (H) 65 - 100 mg/dL    BUN 15 8 - 23 MG/DL    Creatinine 1.07 0.8 - 1.5 MG/DL    GFR est AA >60 >60 ml/min/1.73m2    GFR est non-AA >60 >60 ml/min/1.73m2    Calcium 8.5 8.3 - 10.4 MG/DL   HEPARIN XA UFH    Collection Time: 10/10/21  3:07 AM   Result Value Ref Range    Heparin Xa UFH 0.39 0.3 - 0.7 IU/mL   GLUCOSE, POC    Collection Time: 10/10/21  7:45 AM   Result Value Ref Range    Glucose (POC) 105 (H) 65 - 100 mg/dL    Performed by Erum        All Micro Results     Procedure Component Value Units Date/Time    BLOOD CULTURE [[id number]] Collected: 10/05/21 1652    Order Status: Completed Specimen: Blood Updated: 10/10/21 1019     Special Requests: --        NO SPECIAL REQUESTS  LEFT  Antecubital       Culture result: NO GROWTH 5 DAYS       BLOOD CULTURE [[id number]] Collected: 10/05/21 1754    Order Status: Completed Specimen: Blood Updated: 10/10/21 1019     Special Requests: --        NO SPECIAL REQUESTS  RIGHT  Antecubital       Culture result: NO GROWTH 5 DAYS       CULTURE, WOUND Teri Mulch STAIN [[id number]]  (Abnormal) Collected: 10/06/21 1445    Order Status: Completed Specimen: Foot, left Updated: 10/10/21 0950     Special Requests: NO SPECIAL REQUESTS        GRAM STAIN 0 TO 3 WBCS SEEN PER OIF      NO DEFINITE ORGANISM SEEN        Culture result: SCANT GRAM NEGATIVE RODS               SCANT (2ND COLONY TYPE/STRAIN) GRAM NEGATIVE RODS                  SCANT PRESUMPTIVE ENTEROCOCCUS SPECIES                  SCANT STAPHYLOCOCCUS AUREUS            REFER TO [address] [id number] AND [id number] FOR ID AND SUSCEPTIBILITY    CULTURE, Geovanni Kelch STAIN [[id number]]  (Abnormal)  (Susceptibility) Collected: 10/06/21 1446 Order Status: Completed Specimen: Foot, left Updated: 10/10/21 0955     Special Requests: NO SPECIAL REQUESTS        GRAM STAIN 2 TO 20 WBCS SEEN PER OIF      NO DEFINITE ORGANISM SEEN        Culture result:       SCANT GRAM NEGATIVE RODS REFER TO [address] [id number] FOR ID AND SUSCEPTIBILITY                  SCANT SECOND GRAM NEGATIVE RODS REFER TO [address] [id number] FOR ID AND SUSCEPTIBILITY                  SCANT ENTEROCOCCUS FAECALIS GROUP D                  SCANT STAPHYLOCOCCUS AUREUS SENSITIVITY TO FOLLOW          CULTURE, Avonne Martha STAIN [[id number]]  (Abnormal)  (Susceptibility) Collected: 10/06/21 1444    Order Status: Completed Specimen: Foot, left Updated: 10/10/21 0953     Special Requests: NO SPECIAL REQUESTS        GRAM STAIN 5 TO 15 WBCS SEEN PER OIF      NO DEFINITE ORGANISM SEEN        Culture result: LIGHT KLEBSIELLA OXYTOCA               LIGHT SECOND GRAM NEGATIVE RODS IDENTIFICATION TO FOLLOW                  SCANT STAPHYLOCOCCUS AUREUS MAXINE ST                  CULTURE IN [facility] UPDATES TO FOLLOW          CULTURE, Celia Martha STAIN [[id number]]  (Abnormal)  (Susceptibility) Collected: 10/05/21 1738    Order Status: Completed Specimen: Wound from Foot Updated: 10/09/21 0852     Special Requests: LEFT FOOT        GRAM STAIN 0 TO 6 WBC'S/OIF      FEW GRAM NEGATIVE RODS        Culture result:       MODERATE KLEBSIELLA OXYTOCA            MODERATE ESCHERICHIA COLI               STAPHYLOCOCCUS AUREUS ISOLATED FROM BROTH ONLY                  ENTEROCOCCUS FAECALIS GROUP D ISOLATED FROM BROTH ONLY          CULTURE, ANAEROBIC [[id number]] Collected: 10/06/21 1445    Order Status: Completed Specimen: Foot, left Updated: 10/08/21 0841     Special Requests: NO SPECIAL REQUESTS        Culture result:       SUBCULTURE IS NECESSARY TO DETERMINE PRESENCE OR ABSENCE OF ANAEROBIC BACTERIA IN THIS CULTURE. FURTHER REPORT TO FOLLOW AFTER INCUBATION OF SUBCULTURE.           CULTURE, ANAEROBIC [[id number]] Collected: 10/06/21 1446    Order Status: Completed Specimen: Foot, left Updated: 10/08/21 0841     Special Requests: NO SPECIAL REQUESTS        Culture result:       SUBCULTURE IS NECESSARY TO DETERMINE PRESENCE OR ABSENCE OF ANAEROBIC BACTERIA IN THIS CULTURE. FURTHER REPORT TO FOLLOW AFTER INCUBATION OF SUBCULTURE. CULTURE, ANAEROBIC [[id number]] Collected: 10/06/21 1444    Order Status: Completed Specimen: Foot, left Updated: 10/08/21 0841     Special Requests: NO SPECIAL REQUESTS        Culture result:       SUBCULTURE IS NECESSARY TO DETERMINE PRESENCE OR ABSENCE OF ANAEROBIC BACTERIA IN THIS CULTURE. FURTHER REPORT TO FOLLOW AFTER INCUBATION OF SUBCULTURE. Lanis Seat Pleasant STAIN [[id number]] Collected: 10/06/21 1500    Order Status: Canceled Specimen: Wound Drainage     GRAM STAIN [[id number]] Collected: 10/06/21 1500    Order Status: Canceled Specimen: Wound     GRAM STAIN [[id number]] Collected: 10/06/21 1500    Order Status: Canceled Specimen: Wound           EKG Results     Procedure 720 Value Units Date/Time    EKG, 12 LEAD, INITIAL [[id number]] Collected: 10/05/21 1658    Order Status: Completed Updated: 10/06/21 3519     Ventricular Rate 73 BPM      Atrial Rate 73 BPM      P-R Interval 138 ms      QRS Duration 84 ms      Q-T Interval 400 ms      QTC Calculation (Bezet) 440 ms      Calculated P Axis 74 degrees      Calculated R Axis 69 degrees      Calculated T Axis 70 degrees      Diagnosis --     Normal sinus rhythm  Normal ECG  No previous ECGs available  Confirmed by Angel Burrows MD (), Sampson Case ([id number]) on 10/6/2021 6:32:25 AM            Other Studies:  No results found.     Current Meds:   Current Facility-Administered Medications   Medication Dose Route Frequency    guaiFENesin ER (MUCINEX) tablet 1,200 mg  1,200 mg Oral Q12H    vancomycin (VANCOCIN) 1250 mg in  ml infusion  1,250 mg IntraVENous Q18H    heparin 25,000 units in dextrose 500 mL infusion  12-25 Units/kg/hr (Adjusted) IntraVENous TITRATE  amLODIPine (NORVASC) tablet 10 mg  10 mg Oral DAILY    HYDROcodone-acetaminophen (NORCO) 5-325 mg per tablet 1 Tablet  1 Tablet Oral Q4H PRN    insulin glargine (LANTUS) injection 30 Units  30 Units SubCUTAneous DAILY    sodium chloride (NS) flush 5-10 mL  5-10 mL IntraVENous PRN    ezetimibe (ZETIA) tablet 10 mg  10 mg Oral DAILY    famotidine (PEPCID) tablet 20 mg  20 mg Oral BID    fluticasone propionate (FLONASE) 50 mcg/actuation nasal spray 2 Spray  2 Spray Both Nostrils DAILY    nitroglycerin (NITROSTAT) tablet 0.4 mg  0.4 mg SubLINGual PRN    predniSONE (DELTASONE) tablet 5 mg  5 mg Oral BID WITH MEALS    sotaloL (BETAPACE) tablet 40 mg  40 mg Oral BID    sodium chloride (NS) flush 5-40 mL  5-40 mL IntraVENous Q8H    sodium chloride (NS) flush 5-40 mL  5-40 mL IntraVENous PRN    acetaminophen (TYLENOL) tablet 650 mg  650 mg Oral Q6H PRN    Or    acetaminophen (TYLENOL) suppository 650 mg  650 mg Rectal Q6H PRN    polyethylene glycol (MIRALAX) packet 17 g  17 g Oral DAILY PRN    ondansetron (ZOFRAN ODT) tablet 4 mg  4 mg Oral Q8H PRN    Or    ondansetron (ZOFRAN) injection 4 mg  4 mg IntraVENous Q6H PRN    traZODone (DESYREL) tablet 75 mg  75 mg Oral QHS    losartan (COZAAR) tablet 100 mg  100 mg Oral DAILY    cefepime (MAXIPIME) 1 g in 0.9% sodium chloride (MBP/ADV) 50 mL MBP  1 g IntraVENous Q12H    dextrose 40% (GLUTOSE) oral gel 1 Tube  15 g Oral PRN    glucagon (GLUCAGEN) injection 1 mg  1 mg IntraMUSCular PRN    dextrose (D50W) injection syrg 12.5-25 g  25-50 mL IntraVENous PRN    insulin lispro (HUMALOG) injection   SubCUTAneous AC&HS       Problem List:  Hospital Problems as of 10/10/2021 Date Reviewed: 10/5/2021        Codes Class Noted - Resolved POA    * (Principal) Abscess of left foot ICD-10-CM: U35.680  ICD-9-CM: 682.7  10/5/2021 - Present Unknown        DM type 2 (diabetes mellitus, type 2) ([facility] ICD-10-CM: E11.9  ICD-9-CM: 250.00  10/5/2021 - Present Unknown HTN (hypertension) ICD-10-CM: I10  ICD-9-CM: 401.9  10/5/2021 - Present Unknown        Sleep apnea ICD-10-CM: G47.30  ICD-9-CM: 780.57  10/5/2021 - Present Unknown        COPD (chronic obstructive pulmonary disease) ([facility] ICD-10-CM: J44.9  ICD-9-CM: 496  10/5/2021 - Present Unknown        Polymyalgia rheumatica ([facility] ICD-10-CM: M35.3  ICD-9-CM: 644  10/5/2021 - Present Unknown        Hyperlipidemia ICD-10-CM: E78.5  ICD-9-CM: 272.4  10/5/2021 - Present Unknown        Diabetic neuropathy associated with type 2 diabetes mellitus ([facility] ICD-10-CM: E11.40  ICD-9-CM: 250.60, 357.2  10/5/2021 - Present Unknown        Class 1 obesity in adult ICD-10-CM: E66.9  ICD-9-CM: 278.00  10/5/2021 - Present Unknown        CAD (coronary artery disease) ICD-10-CM: I25.10  ICD-9-CM: 414.00  10/5/2021 - Present Unknown        H/O cardiac pacemaker ICD-10-CM: Z95.0  ICD-9-CM: V12.50, V45.01  10/5/2021 - Present Unknown                 Signed By: Bob Young MD   [employer] [facility]    October 10, 2021  3:31 PM

## 2021-10-10 NOTE — PROGRESS NOTES
Patient resting in bed, O2 sat 95% on while on home cpap. Patient able to verbalize needs. Wound Vac intact on cont suction at 125. Hourly rounds completed. Bed in lowest position, call light and possessions within reach. All needs met at this time. Will give report to oncoming nurse.

## 2021-10-10 NOTE — PROGRESS NOTES
October 10, 2021         Post Op day: 4 Days Post-Op Procedure(s) (LRB):  LEFT FOOT INCISION AND DRAINAGE/ SUPINE  PT IS IN ER/ TO BE ADMITTED 10/5  ANKLE BLOCK W/ MAC (Left)      Admit Date: 10/5/2021  Admit Diagnosis: Abscess of left foot [L02.612]       Principle Problem: Abscess of left foot. Subjective: Doing well, No complaints, No SOB, No Chest Pain, No Nausea or Vomiting     Objective:   Vital Signs are Stable, No Acute Distress, Alert,  Dressing is Dry,  Neurovascular exam is normal.     Assessment / Plan :  Patient Active Problem List   Diagnosis Code    Lumbar stenosis with neurogenic claudication M48.062    Abscess of left foot L5.1    DM type 2 (diabetes mellitus, type 2) ([facility] E11.9    HTN (hypertension) I10    Sleep apnea G47.30    COPD (chronic obstructive pulmonary disease) ([facility]) J44.9    Polymyalgia rheumatica ([facility]) M35.3    Hyperlipidemia E78.5    Diabetic neuropathy associated with type 2 diabetes mellitus ([facility]) E11.40    Class 1 obesity in adult E66.9    CAD (coronary artery disease) I25.10    H/O cardiac pacemaker Z95.0      Patient Vitals for the past 8 hrs:   BP Temp Pulse Resp SpO2   10/10/21 0501 (!) 150/66 98.2 °F (36.8 °C) 74 20 94 %    Temp (24hrs), Av.4 °F (36.9 °C), Min:98.1 °F (36.7 °C), Max:98.8 °F (37.1 °C)    Body mass index is 34.02 kg/m².     Lab Results   Component Value Date/Time    HGB 12.1 (L) 10/10/2021 03:07 AM          S/P Procedure(s) (LRB):  LEFT FOOT INCISION AND DRAINAGE/ SUPINE  PT IS IN ER/ TO BE ADMITTED 10/5  ANKLE BLOCK W/ MAC (Left)    Plan for repeat surgery tomorrow with Dr. Marcia Guerrero after midnight  Medical Mgmt per hospitalist  Fall Precautions         Signed By: AMISHA Dyson  10/10/2021,  8:37 AM

## 2021-10-11 ENCOUNTER — APPOINTMENT (OUTPATIENT)
Dept: GENERAL RADIOLOGY | Age: 76
DRG: 617 | End: 2021-10-11
Attending: ORTHOPAEDIC SURGERY
Payer: MEDICARE

## 2021-10-11 ENCOUNTER — ANESTHESIA (OUTPATIENT)
Dept: SURGERY | Age: 76
DRG: 617 | End: 2021-10-11
Payer: MEDICARE

## 2021-10-11 PROBLEM — N17.9 AKI (ACUTE KIDNEY INJURY) (HCC): Status: ACTIVE | Noted: 2021-10-11

## 2021-10-11 PROBLEM — J44.1 COPD WITH ACUTE EXACERBATION (HCC): Status: ACTIVE | Noted: 2021-10-11

## 2021-10-11 LAB
ANION GAP SERPL CALC-SCNC: 7 MMOL/L (ref 7–16)
BACTERIA SPEC CULT: ABNORMAL
BASOPHILS # BLD: 0.1 K/UL (ref 0–0.2)
BASOPHILS NFR BLD: 1 % (ref 0–2)
BUN SERPL-MCNC: 18 MG/DL (ref 8–23)
CALCIUM SERPL-MCNC: 8.7 MG/DL (ref 8.3–10.4)
CHLORIDE SERPL-SCNC: 104 MMOL/L (ref 98–107)
CO2 SERPL-SCNC: 26 MMOL/L (ref 21–32)
COVID-19 RAPID TEST, COVR: NOT DETECTED
CREAT SERPL-MCNC: 1.32 MG/DL (ref 0.8–1.5)
DIFFERENTIAL METHOD BLD: ABNORMAL
EOSINOPHIL # BLD: 0.1 K/UL (ref 0–0.8)
EOSINOPHIL NFR BLD: 1 % (ref 0.5–7.8)
ERYTHROCYTE [DISTWIDTH] IN BLOOD BY AUTOMATED COUNT: 12.1 % (ref 11.9–14.6)
GLUCOSE BLD STRIP.AUTO-MCNC: 117 MG/DL (ref 65–100)
GLUCOSE BLD STRIP.AUTO-MCNC: 162 MG/DL (ref 65–100)
GLUCOSE BLD STRIP.AUTO-MCNC: 232 MG/DL (ref 65–100)
GLUCOSE BLD STRIP.AUTO-MCNC: 371 MG/DL (ref 65–100)
GLUCOSE BLD STRIP.AUTO-MCNC: 410 MG/DL (ref 65–100)
GLUCOSE SERPL-MCNC: 147 MG/DL (ref 65–100)
GRAM STN SPEC: ABNORMAL
HCT VFR BLD AUTO: 37.1 % (ref 41.1–50.3)
HGB BLD-MCNC: 12.4 G/DL (ref 13.6–17.2)
IMM GRANULOCYTES # BLD AUTO: 0.1 K/UL (ref 0–0.5)
IMM GRANULOCYTES NFR BLD AUTO: 1 % (ref 0–5)
LYMPHOCYTES # BLD: 1.3 K/UL (ref 0.5–4.6)
LYMPHOCYTES NFR BLD: 15 % (ref 13–44)
MCH RBC QN AUTO: 31.2 PG (ref 26.1–32.9)
MCHC RBC AUTO-ENTMCNC: 33.4 G/DL (ref 31.4–35)
MCV RBC AUTO: 93.2 FL (ref 79.6–97.8)
MONOCYTES # BLD: 0.6 K/UL (ref 0.1–1.3)
MONOCYTES NFR BLD: 8 % (ref 4–12)
NEUTS SEG # BLD: 6.2 K/UL (ref 1.7–8.2)
NEUTS SEG NFR BLD: 75 % (ref 43–78)
NRBC # BLD: 0 K/UL (ref 0–0.2)
PLATELET # BLD AUTO: 401 K/UL (ref 150–450)
PMV BLD AUTO: 9 FL (ref 9.4–12.3)
POTASSIUM SERPL-SCNC: 3.9 MMOL/L (ref 3.5–5.1)
RBC # BLD AUTO: 3.98 M/UL (ref 4.23–5.6)
SERVICE CMNT-IMP: ABNORMAL
SODIUM SERPL-SCNC: 137 MMOL/L (ref 138–145)
SOURCE, COVRS: NORMAL
UFH PPP CHRO-ACNC: 0.67 IU/ML (ref 0.3–0.7)
UFH PPP CHRO-ACNC: <0.1 IU/ML (ref 0.3–0.7)
WBC # BLD AUTO: 8.3 K/UL (ref 4.3–11.1)

## 2021-10-11 PROCEDURE — 0Y6N0ZB DETACHMENT AT LEFT FOOT, PARTIAL 2ND RAY, OPEN APPROACH: ICD-10-PCS | Performed by: ORTHOPAEDIC SURGERY

## 2021-10-11 PROCEDURE — 85520 HEPARIN ASSAY: CPT

## 2021-10-11 PROCEDURE — 76210000006 HC OR PH I REC 0.5 TO 1 HR: Performed by: ORTHOPAEDIC SURGERY

## 2021-10-11 PROCEDURE — 74011250637 HC RX REV CODE- 250/637: Performed by: ANESTHESIOLOGY

## 2021-10-11 PROCEDURE — 74011250636 HC RX REV CODE- 250/636: Performed by: NURSE ANESTHETIST, CERTIFIED REGISTERED

## 2021-10-11 PROCEDURE — 0Y6N0ZF DETACHMENT AT LEFT FOOT, PARTIAL 5TH RAY, OPEN APPROACH: ICD-10-PCS | Performed by: ORTHOPAEDIC SURGERY

## 2021-10-11 PROCEDURE — 77030002916 HC SUT ETHLN J&J -A: Performed by: ORTHOPAEDIC SURGERY

## 2021-10-11 PROCEDURE — 94760 N-INVAS EAR/PLS OXIMETRY 1: CPT

## 2021-10-11 PROCEDURE — 74011000250 HC RX REV CODE- 250: Performed by: NURSE ANESTHETIST, CERTIFIED REGISTERED

## 2021-10-11 PROCEDURE — 74011250637 HC RX REV CODE- 250/637: Performed by: NURSE PRACTITIONER

## 2021-10-11 PROCEDURE — 77030006788 HC BLD SAW OSC STRY -B: Performed by: ORTHOPAEDIC SURGERY

## 2021-10-11 PROCEDURE — 0Y6N0ZC DETACHMENT AT LEFT FOOT, PARTIAL 3RD RAY, OPEN APPROACH: ICD-10-PCS | Performed by: ORTHOPAEDIC SURGERY

## 2021-10-11 PROCEDURE — 0Y6N0Z9 DETACHMENT AT LEFT FOOT, PARTIAL 1ST RAY, OPEN APPROACH: ICD-10-PCS | Performed by: ORTHOPAEDIC SURGERY

## 2021-10-11 PROCEDURE — 74011636637 HC RX REV CODE- 636/637: Performed by: FAMILY MEDICINE

## 2021-10-11 PROCEDURE — 85025 COMPLETE CBC W/AUTO DIFF WBC: CPT

## 2021-10-11 PROCEDURE — 94640 AIRWAY INHALATION TREATMENT: CPT

## 2021-10-11 PROCEDURE — 74011250636 HC RX REV CODE- 250/636: Performed by: ANESTHESIOLOGY

## 2021-10-11 PROCEDURE — 76942 ECHO GUIDE FOR BIOPSY: CPT | Performed by: ORTHOPAEDIC SURGERY

## 2021-10-11 PROCEDURE — 74011000250 HC RX REV CODE- 250: Performed by: ANESTHESIOLOGY

## 2021-10-11 PROCEDURE — 0Y6N0ZD DETACHMENT AT LEFT FOOT, PARTIAL 4TH RAY, OPEN APPROACH: ICD-10-PCS | Performed by: ORTHOPAEDIC SURGERY

## 2021-10-11 PROCEDURE — 28805 AMPUTATION THRU METATARSAL: CPT | Performed by: ORTHOPAEDIC SURGERY

## 2021-10-11 PROCEDURE — 77030020269 HC MISC IMPL: Performed by: ORTHOPAEDIC SURGERY

## 2021-10-11 PROCEDURE — 80048 BASIC METABOLIC PNL TOTAL CA: CPT

## 2021-10-11 PROCEDURE — 74011250636 HC RX REV CODE- 250/636: Performed by: ORTHOPAEDIC SURGERY

## 2021-10-11 PROCEDURE — 36415 COLL VENOUS BLD VENIPUNCTURE: CPT

## 2021-10-11 PROCEDURE — 94664 DEMO&/EVAL PT USE INHALER: CPT

## 2021-10-11 PROCEDURE — 76060000034 HC ANESTHESIA 1.5 TO 2 HR: Performed by: ORTHOPAEDIC SURGERY

## 2021-10-11 PROCEDURE — 77030002933 HC SUT MCRYL J&J -A: Performed by: ORTHOPAEDIC SURGERY

## 2021-10-11 PROCEDURE — 2709999900 HC NON-CHARGEABLE SUPPLY: Performed by: ORTHOPAEDIC SURGERY

## 2021-10-11 PROCEDURE — 74011250637 HC RX REV CODE- 250/637: Performed by: FAMILY MEDICINE

## 2021-10-11 PROCEDURE — 76010010054 HC POST OP PAIN BLOCK: Performed by: ORTHOPAEDIC SURGERY

## 2021-10-11 PROCEDURE — 82962 GLUCOSE BLOOD TEST: CPT

## 2021-10-11 PROCEDURE — 74011250636 HC RX REV CODE- 250/636: Performed by: FAMILY MEDICINE

## 2021-10-11 PROCEDURE — 74011000250 HC RX REV CODE- 250: Performed by: FAMILY MEDICINE

## 2021-10-11 PROCEDURE — 74011636637 HC RX REV CODE- 636/637: Performed by: NURSE PRACTITIONER

## 2021-10-11 PROCEDURE — 76010000162 HC OR TIME 1.5 TO 2 HR INTENSV-TIER 1: Performed by: ORTHOPAEDIC SURGERY

## 2021-10-11 PROCEDURE — 87635 SARS-COV-2 COVID-19 AMP PRB: CPT

## 2021-10-11 PROCEDURE — 65660000000 HC RM CCU STEPDOWN

## 2021-10-11 PROCEDURE — 77030003602 HC NDL NRV BLK BBMI -B: Performed by: ANESTHESIOLOGY

## 2021-10-11 PROCEDURE — 74011000258 HC RX REV CODE- 258: Performed by: FAMILY MEDICINE

## 2021-10-11 PROCEDURE — 77030000032 HC CUF TRNQT ZIMM -B: Performed by: ORTHOPAEDIC SURGERY

## 2021-10-11 RX ORDER — NALOXONE HYDROCHLORIDE 0.4 MG/ML
0.1 INJECTION, SOLUTION INTRAMUSCULAR; INTRAVENOUS; SUBCUTANEOUS AS NEEDED
Status: DISCONTINUED | OUTPATIENT
Start: 2021-10-11 | End: 2021-10-13 | Stop reason: HOSPADM

## 2021-10-11 RX ORDER — DEXAMETHASONE SODIUM PHOSPHATE 4 MG/ML
INJECTION, SOLUTION INTRA-ARTICULAR; INTRALESIONAL; INTRAMUSCULAR; INTRAVENOUS; SOFT TISSUE
Status: COMPLETED | OUTPATIENT
Start: 2021-10-11 | End: 2021-10-11

## 2021-10-11 RX ORDER — LABETALOL HYDROCHLORIDE 5 MG/ML
10 INJECTION, SOLUTION INTRAVENOUS
Status: DISCONTINUED | OUTPATIENT
Start: 2021-10-11 | End: 2021-10-13 | Stop reason: HOSPADM

## 2021-10-11 RX ORDER — SODIUM CHLORIDE 0.9 % (FLUSH) 0.9 %
5-40 SYRINGE (ML) INJECTION EVERY 8 HOURS
Status: DISCONTINUED | OUTPATIENT
Start: 2021-10-11 | End: 2021-10-11 | Stop reason: HOSPADM

## 2021-10-11 RX ORDER — PROPOFOL 10 MG/ML
INJECTION, EMULSION INTRAVENOUS AS NEEDED
Status: DISCONTINUED | OUTPATIENT
Start: 2021-10-11 | End: 2021-10-11 | Stop reason: HOSPADM

## 2021-10-11 RX ORDER — ROPIVACAINE HYDROCHLORIDE 5 MG/ML
INJECTION, SOLUTION EPIDURAL; INFILTRATION; PERINEURAL
Status: COMPLETED | OUTPATIENT
Start: 2021-10-11 | End: 2021-10-11

## 2021-10-11 RX ORDER — LIDOCAINE HYDROCHLORIDE 10 MG/ML
0.1 INJECTION INFILTRATION; PERINEURAL AS NEEDED
Status: DISCONTINUED | OUTPATIENT
Start: 2021-10-11 | End: 2021-10-11 | Stop reason: HOSPADM

## 2021-10-11 RX ORDER — HEPARIN SODIUM 5000 [USP'U]/100ML
12-25 INJECTION, SOLUTION INTRAVENOUS
Status: DISCONTINUED | OUTPATIENT
Start: 2021-10-11 | End: 2021-10-12

## 2021-10-11 RX ORDER — VANCOMYCIN HYDROCHLORIDE 1 G/20ML
INJECTION, POWDER, LYOPHILIZED, FOR SOLUTION INTRAVENOUS AS NEEDED
Status: DISCONTINUED | OUTPATIENT
Start: 2021-10-11 | End: 2021-10-11 | Stop reason: HOSPADM

## 2021-10-11 RX ORDER — DIPHENHYDRAMINE HYDROCHLORIDE 50 MG/ML
12.5 INJECTION, SOLUTION INTRAMUSCULAR; INTRAVENOUS
Status: DISCONTINUED | OUTPATIENT
Start: 2021-10-11 | End: 2021-10-13 | Stop reason: HOSPADM

## 2021-10-11 RX ORDER — ONDANSETRON 2 MG/ML
INJECTION INTRAMUSCULAR; INTRAVENOUS AS NEEDED
Status: DISCONTINUED | OUTPATIENT
Start: 2021-10-11 | End: 2021-10-11 | Stop reason: HOSPADM

## 2021-10-11 RX ORDER — CEFAZOLIN SODIUM/WATER 2 G/20 ML
2 SYRINGE (ML) INTRAVENOUS ONCE
Status: DISCONTINUED | OUTPATIENT
Start: 2021-10-11 | End: 2021-10-11

## 2021-10-11 RX ORDER — FENTANYL CITRATE 50 UG/ML
100 INJECTION, SOLUTION INTRAMUSCULAR; INTRAVENOUS AS NEEDED
Status: COMPLETED | OUTPATIENT
Start: 2021-10-11 | End: 2021-10-11

## 2021-10-11 RX ORDER — ACETAMINOPHEN 500 MG
1000 TABLET ORAL ONCE
Status: COMPLETED | OUTPATIENT
Start: 2021-10-11 | End: 2021-10-11

## 2021-10-11 RX ORDER — MIDAZOLAM HYDROCHLORIDE 1 MG/ML
2 INJECTION, SOLUTION INTRAMUSCULAR; INTRAVENOUS ONCE
Status: COMPLETED | OUTPATIENT
Start: 2021-10-11 | End: 2021-10-11

## 2021-10-11 RX ORDER — LIDOCAINE HYDROCHLORIDE 20 MG/ML
INJECTION, SOLUTION EPIDURAL; INFILTRATION; INTRACAUDAL; PERINEURAL AS NEEDED
Status: DISCONTINUED | OUTPATIENT
Start: 2021-10-11 | End: 2021-10-11 | Stop reason: HOSPADM

## 2021-10-11 RX ORDER — PROPOFOL 10 MG/ML
INJECTION, EMULSION INTRAVENOUS
Status: DISCONTINUED | OUTPATIENT
Start: 2021-10-11 | End: 2021-10-11 | Stop reason: HOSPADM

## 2021-10-11 RX ORDER — IPRATROPIUM BROMIDE AND ALBUTEROL SULFATE 2.5; .5 MG/3ML; MG/3ML
3 SOLUTION RESPIRATORY (INHALATION)
Status: DISCONTINUED | OUTPATIENT
Start: 2021-10-11 | End: 2021-10-13 | Stop reason: HOSPADM

## 2021-10-11 RX ORDER — SODIUM CHLORIDE 0.9 % (FLUSH) 0.9 %
5-40 SYRINGE (ML) INJECTION AS NEEDED
Status: DISCONTINUED | OUTPATIENT
Start: 2021-10-11 | End: 2021-10-11 | Stop reason: HOSPADM

## 2021-10-11 RX ORDER — LIDOCAINE HYDROCHLORIDE AND EPINEPHRINE 20; 5 MG/ML; UG/ML
INJECTION, SOLUTION EPIDURAL; INFILTRATION; INTRACAUDAL; PERINEURAL AS NEEDED
Status: DISCONTINUED | OUTPATIENT
Start: 2021-10-11 | End: 2021-10-11 | Stop reason: HOSPADM

## 2021-10-11 RX ORDER — HYDROMORPHONE HYDROCHLORIDE 1 MG/ML
0.5 INJECTION, SOLUTION INTRAMUSCULAR; INTRAVENOUS; SUBCUTANEOUS
Status: DISCONTINUED | OUTPATIENT
Start: 2021-10-11 | End: 2021-10-13 | Stop reason: HOSPADM

## 2021-10-11 RX ORDER — SODIUM CHLORIDE, SODIUM LACTATE, POTASSIUM CHLORIDE, CALCIUM CHLORIDE 600; 310; 30; 20 MG/100ML; MG/100ML; MG/100ML; MG/100ML
75 INJECTION, SOLUTION INTRAVENOUS CONTINUOUS
Status: DISCONTINUED | OUTPATIENT
Start: 2021-10-11 | End: 2021-10-13 | Stop reason: HOSPADM

## 2021-10-11 RX ORDER — FLUMAZENIL 0.1 MG/ML
0.2 INJECTION INTRAVENOUS
Status: DISCONTINUED | OUTPATIENT
Start: 2021-10-11 | End: 2021-10-13 | Stop reason: HOSPADM

## 2021-10-11 RX ORDER — SODIUM CHLORIDE, SODIUM LACTATE, POTASSIUM CHLORIDE, CALCIUM CHLORIDE 600; 310; 30; 20 MG/100ML; MG/100ML; MG/100ML; MG/100ML
100 INJECTION, SOLUTION INTRAVENOUS CONTINUOUS
Status: DISCONTINUED | OUTPATIENT
Start: 2021-10-11 | End: 2021-10-11 | Stop reason: HOSPADM

## 2021-10-11 RX ORDER — VANCOMYCIN HYDROCHLORIDE 1 G/20ML
1 INJECTION, POWDER, LYOPHILIZED, FOR SOLUTION INTRAVENOUS ONCE
Status: DISCONTINUED | OUTPATIENT
Start: 2021-10-11 | End: 2021-10-11

## 2021-10-11 RX ORDER — HYDROCORTISONE SODIUM SUCCINATE 100 MG/2ML
25 INJECTION, POWDER, FOR SOLUTION INTRAMUSCULAR; INTRAVENOUS ONCE
Status: COMPLETED | OUTPATIENT
Start: 2021-10-11 | End: 2021-10-11

## 2021-10-11 RX ADMIN — PREDNISONE 5 MG: 10 TABLET ORAL at 05:43

## 2021-10-11 RX ADMIN — FAMOTIDINE 20 MG: 20 TABLET ORAL at 05:42

## 2021-10-11 RX ADMIN — CEFAZOLIN 3 G: 1 INJECTION, POWDER, FOR SOLUTION INTRAVENOUS at 10:59

## 2021-10-11 RX ADMIN — IPRATROPIUM BROMIDE AND ALBUTEROL SULFATE 3 ML: .5; 3 SOLUTION RESPIRATORY (INHALATION) at 14:57

## 2021-10-11 RX ADMIN — AMLODIPINE BESYLATE 10 MG: 10 TABLET ORAL at 05:42

## 2021-10-11 RX ADMIN — SODIUM CHLORIDE, SODIUM LACTATE, POTASSIUM CHLORIDE, AND CALCIUM CHLORIDE 100 ML/HR: 600; 310; 30; 20 INJECTION, SOLUTION INTRAVENOUS at 07:07

## 2021-10-11 RX ADMIN — VANCOMYCIN HYDROCHLORIDE 1250 MG: 10 INJECTION, POWDER, LYOPHILIZED, FOR SOLUTION INTRAVENOUS at 07:02

## 2021-10-11 RX ADMIN — ACETAMINOPHEN 1000 MG: 500 TABLET ORAL at 07:11

## 2021-10-11 RX ADMIN — GUAIFENESIN 1200 MG: 600 TABLET ORAL at 22:14

## 2021-10-11 RX ADMIN — PROPOFOL 100 MCG/KG/MIN: 10 INJECTION, EMULSION INTRAVENOUS at 10:46

## 2021-10-11 RX ADMIN — HYDROCORTISONE SODIUM SUCCINATE 25 MG: 100 INJECTION, POWDER, FOR SOLUTION INTRAMUSCULAR; INTRAVENOUS at 08:10

## 2021-10-11 RX ADMIN — GUAIFENESIN 1200 MG: 600 TABLET ORAL at 14:03

## 2021-10-11 RX ADMIN — SOTALOL HYDROCHLORIDE 40 MG: 80 TABLET ORAL at 17:12

## 2021-10-11 RX ADMIN — INSULIN LISPRO 6 UNITS: 100 INJECTION, SOLUTION INTRAVENOUS; SUBCUTANEOUS at 15:47

## 2021-10-11 RX ADMIN — LIDOCAINE HYDROCHLORIDE,EPINEPHRINE BITARTRATE 5 ML: 20; .005 INJECTION, SOLUTION EPIDURAL; INFILTRATION; INTRACAUDAL; PERINEURAL at 08:10

## 2021-10-11 RX ADMIN — PHENYLEPHRINE HYDROCHLORIDE 200 MCG: 10 INJECTION INTRAVENOUS at 11:28

## 2021-10-11 RX ADMIN — METHYLPREDNISOLONE SODIUM SUCCINATE 40 MG: 40 INJECTION, POWDER, FOR SOLUTION INTRAMUSCULAR; INTRAVENOUS at 15:44

## 2021-10-11 RX ADMIN — HEPARIN SODIUM 12 UNITS/KG/HR: 5000 INJECTION, SOLUTION INTRAVENOUS at 20:47

## 2021-10-11 RX ADMIN — PHENYLEPHRINE HYDROCHLORIDE 100 MCG: 10 INJECTION INTRAVENOUS at 11:19

## 2021-10-11 RX ADMIN — DEXAMETHASONE SODIUM PHOSPHATE 4 MG: 4 INJECTION, SOLUTION INTRAMUSCULAR; INTRAVENOUS at 08:12

## 2021-10-11 RX ADMIN — IPRATROPIUM BROMIDE AND ALBUTEROL SULFATE 3 ML: .5; 3 SOLUTION RESPIRATORY (INHALATION) at 19:47

## 2021-10-11 RX ADMIN — FLUTICASONE PROPIONATE 2 SPRAY: 50 SPRAY, METERED NASAL at 13:54

## 2021-10-11 RX ADMIN — MIDAZOLAM 2 MG: 1 INJECTION INTRAMUSCULAR; INTRAVENOUS at 08:07

## 2021-10-11 RX ADMIN — Medication 10 ML: at 06:19

## 2021-10-11 RX ADMIN — EZETIMIBE 10 MG: 10 TABLET ORAL at 14:03

## 2021-10-11 RX ADMIN — Medication 10 ML: at 22:22

## 2021-10-11 RX ADMIN — DEXAMETHASONE SODIUM PHOSPHATE 4 MG: 4 INJECTION, SOLUTION INTRA-ARTICULAR; INTRALESIONAL; INTRAMUSCULAR; INTRAVENOUS; SOFT TISSUE at 08:10

## 2021-10-11 RX ADMIN — FAMOTIDINE 20 MG: 20 TABLET ORAL at 17:11

## 2021-10-11 RX ADMIN — FENTANYL CITRATE 50 MCG: 50 INJECTION INTRAMUSCULAR; INTRAVENOUS at 08:07

## 2021-10-11 RX ADMIN — TRAZODONE HYDROCHLORIDE 75 MG: 50 TABLET ORAL at 22:11

## 2021-10-11 RX ADMIN — METHYLPREDNISOLONE SODIUM SUCCINATE 40 MG: 40 INJECTION, POWDER, FOR SOLUTION INTRAMUSCULAR; INTRAVENOUS at 22:17

## 2021-10-11 RX ADMIN — ROPIVACAINE HYDROCHLORIDE 20 ML: 5 INJECTION, SOLUTION EPIDURAL; INFILTRATION; PERINEURAL at 08:10

## 2021-10-11 RX ADMIN — LIDOCAINE HYDROCHLORIDE 100 MG: 20 INJECTION, SOLUTION EPIDURAL; INFILTRATION; INTRACAUDAL; PERINEURAL at 10:45

## 2021-10-11 RX ADMIN — PHENYLEPHRINE HYDROCHLORIDE 200 MCG: 10 INJECTION INTRAVENOUS at 11:34

## 2021-10-11 RX ADMIN — LIDOCAINE HYDROCHLORIDE,EPINEPHRINE BITARTRATE 5 ML: 20; .005 INJECTION, SOLUTION EPIDURAL; INFILTRATION; INTRACAUDAL; PERINEURAL at 08:12

## 2021-10-11 RX ADMIN — INSULIN LISPRO 15 UNITS: 100 INJECTION, SOLUTION INTRAVENOUS; SUBCUTANEOUS at 22:43

## 2021-10-11 RX ADMIN — PROPOFOL 50 MG: 10 INJECTION, EMULSION INTRAVENOUS at 10:45

## 2021-10-11 RX ADMIN — ROPIVACAINE HYDROCHLORIDE 15 ML: 5 INJECTION, SOLUTION EPIDURAL; INFILTRATION; PERINEURAL at 08:12

## 2021-10-11 RX ADMIN — SODIUM CHLORIDE, SODIUM LACTATE, POTASSIUM CHLORIDE, AND CALCIUM CHLORIDE: 600; 310; 30; 20 INJECTION, SOLUTION INTRAVENOUS at 12:02

## 2021-10-11 RX ADMIN — Medication 10 ML: at 13:55

## 2021-10-11 RX ADMIN — SODIUM CHLORIDE, SODIUM LACTATE, POTASSIUM CHLORIDE, AND CALCIUM CHLORIDE 75 ML/HR: 600; 310; 30; 20 INJECTION, SOLUTION INTRAVENOUS at 14:04

## 2021-10-11 RX ADMIN — CEFEPIME HYDROCHLORIDE 1 G: 1 INJECTION, POWDER, FOR SOLUTION INTRAMUSCULAR; INTRAVENOUS at 17:10

## 2021-10-11 RX ADMIN — PHENYLEPHRINE HYDROCHLORIDE 100 MCG: 10 INJECTION INTRAVENOUS at 11:17

## 2021-10-11 RX ADMIN — SOTALOL HYDROCHLORIDE 40 MG: 80 TABLET ORAL at 05:43

## 2021-10-11 RX ADMIN — INSULIN GLARGINE 30 UNITS: 100 INJECTION, SOLUTION SUBCUTANEOUS at 14:03

## 2021-10-11 RX ADMIN — ONDANSETRON 4 MG: 2 INJECTION INTRAMUSCULAR; INTRAVENOUS at 11:41

## 2021-10-11 RX ADMIN — CEFEPIME HYDROCHLORIDE 1 G: 1 INJECTION, POWDER, FOR SOLUTION INTRAMUSCULAR; INTRAVENOUS at 05:00

## 2021-10-11 NOTE — OP NOTES
aOperative Note    [address]  MRN: [id number]    Date Of Surgery: 10/11/2021    Surgeon: Berto Mixon MD    Assistant Surgeon: None    Procedure Performed:   Left midfoot amputation    This is the second stage of a planned two-stage operation    Pre Op Diagnosis:  Left diabetic foot infection    Post Op Diagnosis:   same    Implants:   Implant Name Type Inv. Item Serial No.  Lot No. LRB No. Used Action   CE[employer] 1G   [id number]  [id number] 1 Implanted       Anesthesia:  General    Blood Loss:  10 cc    Tourniquet:  Estimated thigh tourniquet 50 minutes [id number]    Complications:  None    Pre Operative Abx:   Ancef 3g    Specimens/Cultures:  none    Significant Findings:  Amputation site of the foot was clean. I was able to amputate proximal to the chronic plantar foot wound and had good perfusion. Pre Operative Course:  Ashley Sterling is a 77 y.o. male who has lost a history of a plantar foot wound, insulin-dependent diabetes and neuropathy. He is on prednisone. He has been unable to heal his plantar wound over the past 7 years. This resulted in a septic first MTP joint with abscess. He underwent initial irrigation debridement of his abscess in his plantar ulcer. After discussion with him about multiple debridements and long-term wound care he opted for a transmetatarsal amputation. Today presents for the second stage of a planned two-stage operation. Operation In Detail:  Patient was evaluated in the preoperative area. The left lower extremity was marked by me. We had a long discussion about the procedure and postoperative protocols. The patient was then brought back to the operating room suite and placed in the operating room table. A timeout was taken to identify the patient, procedure being performed, and laterality.   After this the patient was prepped and draped in the normal sterile fashion using a Betadine solution and/or a ChloraPrep solution. A timeout was then taken to identify the patient his name, date of birth, laterality, and procedure being performed. We also identified allergies and any concerns about the operation. Attention was then placed to the operative extremity. Antibiotics, 3 g Ancef given    I marked out a fishmouth incision extending from the base of the first metatarsal shaft on the dorsum of the foot down the MTP joint level and over the 5th MT base. I then lyndsey the plantar aspect proximal to his chronic plantar plantar foot ulcer. I made sure I was out of the side of zone of disease. I took a 15 blade knife and incised the skin, tendons, muscle, directly down to bone. After I made a good fishmouth incision circumferentially around the foot I took an oscillating saw and cut each of the bones, the metatarsals, sequentially. For the first, second, third, fourth, fifth. I beveled them with the first metatarsal being longer than the rest, the second being longer than the third and second. I took the oscillating saw and smoothed off the edges to make sure that none the bones were pointed my fifth metatarsal is the shortest metatarsal..  I then took a knife and removed the foot. The skin remaining was healthy with no signs of retained infection. I then copiously irrigated the foot wound with saline. I feel I need to debulk the plantar flap, so I took a 15 blade knife into both the plantar flap to remove all the flexor tendons. After I do both the plantar flap I with the tourniquet down. I held pressure on the wound. Identified any bleeding and cauterize the bleeding. After bleeding was controlled I reinflated the tourniquet. I then placed vancomycin 1 g powder deep in the wound. I took 2-0 Prolene in close the deep layer of muscle and tissue over the metatarsal cuts. After good deep layer closure I placed the cell right collagen powder.   I then closed the layer of skin over the collagen power approximating the skin edges with a 3-0 Monocryl suture. I then closed the final layer of skin with a 3-0 nylon suture in vertical mattress pattern. Abdomen tourniquet down. Bleeding was controlled. There is no signs of excessive bleeding or hematoma formation. I then placed a sterile dressing on the foot. A sterile dressing was then applied to the leg and Posterior slab splint. They were awoken from anesthesia and returned to the PACU without difficulty. Post Operative Plan:   1- WB status: Non-Weight Bearing  Until follow-up in my office. Dissipate nonweightbearing x6 weeks. 2- Follow Up: 2-3 weeks  3- Immobilization/assistive devices: brace/splint  4- DVT px: restart heparin drip tonight  5- Pain Medication: Percocet 5mg/325 q6 PRN pain #30  6 -this amputation should be a good source control. From my standpoint he is okay to discharge home as long as he is medically stable. I am okay with him restarting his anticoagulation 8 hours postoperatively. This would be around 8 PM tonight. I will see him tomorrow but he should follow-up in my office in approximately 3 weeks for a wound check.

## 2021-10-11 NOTE — CONSULTS
Infectious Disease Consult    Today's Date: 10/11/2021   Admit Date: 10/5/2021    Impression:   · L chronic 1st MT OM with DM related ulcer   -s/p debridement and resection of 1st MTH. OP cx with MSSA, enterococcus, and GNR x 2 10/6  -s/p midfoot amputation 10/11  · Polymyalgia rheumatica on prednisone  · DM2 with neuropathy and obesity   · Doxycycline/Sulfa allergies     Plan:   ·  Stop abx after today as amputation has resolved infection   · **Will sign off at this time. Please call with questions or concerns. Anti-infectives:   · Vanc 10/7-  · Cefepime 10/5-    Subjective:   Date of Consultation:  October 11, 2021  Referring Physician: Angelito aBljitbritney     Patient is a 77 y.o. male with medical hx for chronic left foot diabetic ulcer (present for 7 years), polymyalgia rheumatica on prednisone, A. fib on sotalol, s/p pacemaker, COPD, diabetes mellitus type 2  Who was admitted for worsening L foot ulcer on 10/5. He does follow with [facility] care, recently on clindamycin. Since his admission, orthopedics has been involved and performed debridement with 1st MTH resection on 10/6. Op cx with MSSA, Enterococcus and GNR x 2. Today, Dr Amelie Galan performed midfoot amputation due to location and severity of infection. Pt had one temp at admission but BCx2 NG. He is currently on Vancomycin and cefepime. We were consulted for foot osteomyelitis.       Patient Active Problem List   Diagnosis Code    Lumbar stenosis with neurogenic claudication M48.062    Abscess of left foot L5.1    DM type 2 (diabetes mellitus, type 2) ([facility] E11.9    HTN (hypertension) I10    Sleep apnea G47.30    COPD (chronic obstructive pulmonary disease) ([facility] J44.9    Polymyalgia rheumatica ([facility]) M35.3    Hyperlipidemia E78.5    Diabetic neuropathy associated with type 2 diabetes mellitus ([facility] E11.40    Class 1 obesity in adult E66.9    CAD (coronary artery disease) I25.10    H/O cardiac pacemaker Z95.0     Past Medical History:   Diagnosis Date  Arrhythmia     Atrial fibrillation (HCC)     Bradycardia, sinus     pacer    CAD (coronary artery disease) 2017, 2018    2 stents to LAD, documented in chart, patient states 10 stents total    Cancer ([facility]     Prostate    Chronic obstructive pulmonary disease ([facility]     Asbestos    Chronic pain     Low back pain    Diabetes ([facility]     Type2, , Low's 85, Last A1C 7.4 per Pt    GERD (gastroesophageal reflux disease)     Hypertension     Neuropathy     Pacemaker     PVD (peripheral vascular disease) ([facility]     Sleep apnea     wears CPAP      Family History   Problem Relation Age of Onset    Diabetes Mother     Hypertension Mother     Stroke Mother     Lung Disease Father     Cancer Brother         Mental cell lymphoma    Heart Disease Brother     Diabetes Brother     Lung Disease Brother     Diabetes Brother     Hypertension Brother     Cancer Sister         esphogeal CA      Social History     Tobacco Use    Smoking status: Former Smoker     Packs/day: 2.00     Years: 25.00     Pack years: 50.00     Quit date:      Years since quittin.8    Smokeless tobacco: Never Used   Substance Use Topics    Alcohol use: No     Past Surgical History:   Procedure Laterality Date    HX APPENDECTOMY      HX COLONOSCOPY      HX HEART CATHETERIZATION  2017    1 stent    HX HEART CATHETERIZATION  2018    1 stent    HX HEENT Left     Laser to left eye    HX LUMBAR LAMINECTOMY  2019    L2-L3    HX OTHER SURGICAL Bilateral     Carpal tunnel release    HX OTHER SURGICAL  1980    Rectal fiser repair    HX PACEMAKER  2016    Dual chamber pacer, [place]    HX PROSTATECTOMY  2002    HX ROTATOR CUFF REPAIR Right     HX UROLOGICAL  2002    Prostatectomy    HX UROLOGICAL      Penile pump    NEUROLOGICAL PROCEDURE UNLISTED      L4-L5 titanium implants, Fusion    NEUROLOGICAL PROCEDURE UNLISTED      Laser surgery L4-L5    ME CARDIAC SURG PROCEDURE UNLIST  06/14/2016    A-Fib Ablation      Prior to Admission medications    Medication Sig Start Date End Date Taking? Authorizing Provider   ondansetron hcl (ZOFRAN) 4 mg tablet TAKE 1 TABLET BY MOUTH EVERY 8 HOURS AS NEEDED FOR NAUSEA OR VOMITING FOR UP TO 7 DAYS 9/15/21  Yes Provider, Historical   famotidine (PEPCID) 20 mg tablet Take 20 mg by mouth two (2) times a day. Yes Provider, Historical   pantoprazole (PROTONIX) 40 mg tablet 1 tablet 4/9/21  Yes Provider, Historical   Carlos Crutch FlexTouch U-200 200 unit/mL (3 mL) inpn pen 52 Units by SubCUTAneous route daily. 4/22/21  Yes Provider, Historical   fluticasone propionate (Flonase Allergy Relief) 50 mcg/actuation nasal spray 1 spray in each nostril   Yes Provider, Historical   ezetimibe (ZETIA) 10 mg tablet  4/22/21  Yes Provider, Historical   NovoLOG Flexpen U-100 Insulin 100 unit/mL (3 mL) inpn INJECT 3 TO 5 UNITS UNDER THE SKIN 3 TIMES A DAY AS NEEDED (BS GREATER THAN 200). 4/14/21  Yes Provider, Historical   nitroglycerin (NITROSTAT) 0.4 mg SL tablet 0.4 mg by SubLINGual route. 3/12/21 3/12/22 Yes Provider, Historical   predniSONE (DELTASONE) 20 mg tablet Take 10 mg by mouth daily. Yes Provider, Historical   rivaroxaban (XARELTO) 20 mg tab tablet Take 20 mg by mouth daily. Yes Provider, Historical   sotalol (BETAPACE) 80 mg tablet Take 40 mg by mouth two (2) times a day. Take / use AM day of surgery  per anesthesia protocols. Yes Provider, Historical   traZODone (DESYREL) 50 mg tablet Take 75 mg by mouth nightly. Yes Provider, Historical   glimepiride (AMARYL) 4 mg tablet Take 4 mg by mouth daily. Yes Provider, Historical   sucralfate (CARAFATE) 1 gram tablet Indications: history of gastric erosions, abdominal pain. Take before meals and at bedtime. Take one hour before other meds and 3 hrs after other medications.   Patient not taking: Reported on 10/6/2021 9/27/21   Provider, Historical   cefpodoxime (VANTIN) 200 mg tablet TAKE 1 TABLET BY MOUTH TWICE DAILY FOR 10 DAYS  Patient not taking: Reported on 10/6/2021 9/8/21   Provider, Historical   amLODIPine (NORVASC) 5 mg tablet Take 5 mg by mouth daily. Patient not taking: Reported on 10/6/2021 8/23/21   Provider, Historical   aspirin 81 mg chewable tablet 1 tablet  Patient not taking: Reported on 7/12/2021    Provider, Historical   azithromycin (ZITHROMAX) 250 mg tablet TAKE 2 TABLETS BY MOUTH TODAY, THEN TAKE 1 TABLET DAILY FOR 4 DAYS  Patient not taking: Reported on 7/12/2021 5/26/21   Provider, Historical   clindamycin (CLEOCIN) 300 mg capsule TAKE 1 CAPSULE 3 TIMES A DAY FOR 21 DAYS FOR INFECTION OF THE SKIN AND/OR SOFT TISSUE. Patient not taking: Reported on 7/12/2021 4/16/21   Provider, Historical   losartan-hydroCHLOROthiazide (HYZAAR) 100-12.5 mg per tablet  7/9/21   Provider, Historical   traMADoL (ULTRAM) 50 mg tablet  7/9/21   Provider, Historical   glimepiride (AMARYL) 2 mg tablet Take 4 mg by mouth Every morning. Patient not taking: Reported on 10/8/2021    Provider, Historical   losartan (COZAAR) 50 mg tablet TAKE 2 TABLETS BY MOUTH EVERY DAY 5/13/21   Provider, Historical   losartan (COZAAR) 100 mg tablet 1 tablet  Patient not taking: Reported on 7/12/2021    Provider, Historical   traZODone (DESYREL) 150 mg tablet TAKE 1 TABLET BY MOUTH EVERY DAY  Patient not taking: Reported on 7/12/2021 5/13/21   Provider, Historical   traZODone (DESYREL) 100 mg tablet 1 tablet at bedtime  Patient not taking: Reported on 7/12/2021    Provider, Historical   losartan-hydroCHLOROthiazide (HYZAAR) 100-12.5 mg per tablet Take 1 Tablet by mouth daily. Patient not taking: Reported on 10/6/2021 7/9/21   Provider, Historical   predniSONE (DELTASONE) 5 mg tablet Take 5 mg by mouth two (2) times a day.   Patient not taking: Reported on 10/8/2021    Provider, Historical   HYDROcodone-acetaminophen (NORCO) 5-325 mg per tablet  5/30/21   Provider, Historical   losartan (COZAAR) 100 mg tablet Take 100 mg by mouth daily. Patient not taking: Reported on 10/8/2021    Provider, Historical   insulin degludec (TRESIBA FLEXTOUCH U-100) 100 unit/mL (3 mL) inpn 24 Units by SubCUTAneous route daily. Patient not taking: Reported on 10/8/2021    Provider, Historical       Allergies   Allergen Reactions    Doxycycline Hives and Rash    Ticagrelor Shortness of Breath    Amoxicillin-Pot Clavulanate Unknown (comments)     Jaundice  Other reaction(s): jaundice    Codeine Itching    Oxycodone-Acetaminophen Other (comments), Itching and Hives    Sulfa (Sulfonamide Antibiotics) Rash and Hives     Other reaction(s): Hives/Swelling-Allergy, Itching-Allergy, Rash-Allergy    Tyloxapol Other (comments)     Hallucinates    Sulfa Dyne Hives    Tape [Adhesive] Other (comments)     \"takes skin off/blisters\"        Review of Systems:  A comprehensive review of systems was negative except for that written in the History of Present Illness. Objective:     Visit Vitals  BP (!) 157/91 (BP 1 Location: Right upper arm, BP Patient Position: At rest)   Pulse 75   Temp 97.8 °F (36.6 °C)   Resp 19   Ht 6' 3\" (1.905 m)   Wt 123.4 kg (272 lb)   SpO2 98%   BMI 34.00 kg/m²     Temp (24hrs), Av.1 °F (36.7 °C), Min:97.5 °F (36.4 °C), Max:98.5 °F (36.9 °C)       Lines:  Peripheral IV:       Physical Exam:    General:  Alert, cooperative, well noursished, well developed, appears stated age   Eyes:  Sclera anicteric. Pupils equally round and reactive to light. Mouth/Throat: Mucous membranes normal, oral pharynx clear   Neck: Supple   Lungs:   Clear to auscultation bilaterally, good effort   CV:  Regular rate and rhythm,no murmur, click, rub or gallop   Abdomen:   Soft, non-tender. bowel sounds normal. non-distended   Extremities: No cyanosis or edema   Skin: L foot now in postop dsg. Amputation of midfoot.     Lymph nodes: Cervical and supraclavicular normal   Musculoskeletal: No swelling or deformity   Lines/Devices:  Intact, no erythema, drainage or tenderness   Psych: Alert and oriented, normal mood affect given the setting               Data Review:     CBC:  Recent Labs     10/11/21  0341 10/10/21  0307 10/09/21  0352 10/09/21  0352   WBC 8.3 8.2  --  7.7   GRANS 75 78   < > 71   MONOS 8 7   < > 8   EOS 1 1   < > 2   ANEU 6.2 6.4   < > 5.5   ABL 1.3 1.0   < > 1.4   HGB 12.4* 12.1*  --  12.3*   HCT 37.1* 36.9*  --  37.4*    400  --  401    < > = values in this interval not displayed. BMP:  Recent Labs     10/11/21  0341 10/10/21  0307 10/09/21  0352   CREA 1.32 1.07 1.13   BUN 18 15 16   * 136 139   K 3.9 4.2 4.1    106 106   CO2 26 26 27   AGAP 7 4* 6*   * 114* 126*       LFTS:  No results for input(s): TBILI, ALT, AP, TP, ALB in the last 72 hours.     No lab exists for component: SGOT    Microbiology:     All Micro Results     Procedure Component Value Units Date/Time    CULTURE, ANAEROBIC [[id number]] Collected: 10/06/21 1444    Order Status: Completed Specimen: Foot, left Updated: 10/11/21 0905     Special Requests: NO SPECIAL REQUESTS        Culture result:       NO ANAEROBIC GROWTH IN 4 DAYS          CULTURE, ANAEROBIC [[id number]] Collected: 10/06/21 1446    Order Status: Completed Specimen: Foot, left Updated: 10/11/21 0901     Special Requests: NO SPECIAL REQUESTS        Culture result:       NO ANAEROBES ISOLATED 4 DAYS          CULTURE, WOUND Emil Enriquez STAIN [[id number]]  (Abnormal) Collected: 10/06/21 1445    Order Status: Completed Specimen: Foot, left Updated: 10/11/21 0741     Special Requests: NO SPECIAL REQUESTS        GRAM STAIN 0 TO 3 WBCS SEEN PER OIF      NO DEFINITE ORGANISM SEEN        Culture result: SCANT GRAM NEGATIVE RODS               SCANT (2ND COLONY TYPE/STRAIN) GRAM NEGATIVE RODS                  SCANT PRESUMPTIVE ENTEROCOCCUS SPECIES                  SCANT STAPHYLOCOCCUS AUREUS            REFER TO ACC [id number] AND [id number] FOR ID AND SUSCEPTIBILITY    CULTURE, WOUND W GRAM STAIN [[id number]]  (Abnormal)  (Susceptibility) Collected: 10/06/21 1446    Order Status: Completed Specimen: Foot, left Updated: 10/11/21 0739     Special Requests: NO SPECIAL REQUESTS        GRAM STAIN 2 TO 20 WBCS SEEN PER OIF      NO DEFINITE ORGANISM SEEN        Culture result:       SCANT GRAM NEGATIVE RODS REFER TO ACC [id number] FOR ID AND SUSCEPTIBILITY                  SCANT SECOND GRAM NEGATIVE RODS REFER TO [address] [id number] FOR ID AND SUSCEPTIBILITY                  SCANT ENTEROCOCCUS FAECALIS GROUP D                  SCANT STAPHYLOCOCCUS AUREUS          CULTURE, Tomie Clas STAIN [[id number]]  (Abnormal)  (Susceptibility) Collected: 10/06/21 1444    Order Status: Completed Specimen: Foot, left Updated: 10/11/21 0737     Special Requests: NO SPECIAL REQUESTS        GRAM STAIN 5 TO 15 WBCS SEEN PER OIF      NO DEFINITE ORGANISM SEEN        Culture result: LIGHT KLEBSIELLA OXYTOCA         LIGHT ESCHERICHIA COLI               SCANT STAPHYLOCOCCUS AUREUS          COVID-19 RAPID TEST [[id number]] Collected: 10/11/21 0604    Order Status: Completed Specimen: Nasopharyngeal Updated: 10/11/21 0701     Specimen source NASAL        Comment: RAPID ONLY        COVID-19 rapid test Not detected        Comment:      The specimen is NEGATIVE for SARS-CoV-2, the novel coronavirus associated with COVID-19. A negative result does not rule out COVID-19. This test has been authorized by the FDA under an Emergency Use Authorization (EUA) for use by authorized laboratories.         Fact sheet for Healthcare Providers: ConventionUpdate.co.nz  Fact sheet for Patients: ConventionUpdate.co.nz       Methodology: Isothermal Nucleic Acid Amplification         BLOOD CULTURE [[id number]] Collected: 10/05/21 1652    Order Status: Completed Specimen: Blood Updated: 10/10/21 1019     Special Requests: --        NO SPECIAL REQUESTS  LEFT  Antecubital       Culture result: NO GROWTH 5 DAYS       BLOOD CULTURE [[id number]] Collected: 10/05/21 1754    Order Status: Completed Specimen: Blood Updated: 10/10/21 1019     Special Requests: --        NO SPECIAL REQUESTS  RIGHT  Antecubital       Culture result: NO GROWTH 5 DAYS       CULTURE, WOUND Lavada Gambles STAIN [[id number]]  (Abnormal)  (Susceptibility) Collected: 10/05/21 1738    Order Status: Completed Specimen: Wound from Foot Updated: 10/09/21 0852     Special Requests: LEFT FOOT        GRAM STAIN 0 TO 6 WBC'S/OIF      FEW GRAM NEGATIVE RODS        Culture result:       MODERATE KLEBSIELLA OXYTOCA            MODERATE ESCHERICHIA COLI               STAPHYLOCOCCUS AUREUS ISOLATED FROM BROTH ONLY                  ENTEROCOCCUS FAECALIS GROUP D ISOLATED FROM BROTH ONLY          CULTURE, ANAEROBIC [[id number]] Collected: 10/06/21 1445    Order Status: Completed Specimen: Foot, left Updated: 10/08/21 0841     Special Requests: NO SPECIAL REQUESTS        Culture result:       SUBCULTURE IS NECESSARY TO DETERMINE PRESENCE OR ABSENCE OF ANAEROBIC BACTERIA IN THIS CULTURE. FURTHER REPORT TO FOLLOW AFTER INCUBATION OF SUBCULTURE.           Danette Soria [[id number]] Collected: 10/06/21 1500    Order Status: Canceled Specimen: Wound Drainage     Danette Soria [[id number]] Collected: 10/06/21 1500    Order Status: Canceled Specimen: Wound     Danette Soria [[id number]] Collected: 10/06/21 1500    Order Status: Canceled Specimen: Wound           Imaging:   See \[facility] [place] [facility]\""/EPIC     Signed By: Fela Nino NP     October 11, 2021

## 2021-10-11 NOTE — ROUTINE PROCESS
Verbal shift change report to be given to self (oncoming nurse) by Kathleen Desouza RN (offgoing nurse). Report included the following information SBAR, Kardex, Procedure Summary, Intake/Output and MAR.

## 2021-10-11 NOTE — PROGRESS NOTES
TRANSFER - IN REPORT:    Verbal report received from [facility] (name) on Shila Tamayo  being received from 9th floor (unit) for ordered procedure      Report consisted of patients Situation, Background, Assessment and   Recommendations(SBAR). Information from the following report(s) SBAR, Kardex, Intake/Output, MAR and Recent Results was reviewed with the receiving nurse. Opportunity for questions and clarification was provided. Assessment completed upon patients arrival to unit and care assumed. Primary RN to address heparin gtt with provider.

## 2021-10-11 NOTE — PERIOP NOTES
TRANSFER - OUT REPORT:    Verbal report given to HERNESTO ALBERTS on Camilla Ax  being transferred to [phone] for routine post - op       Report consisted of patients Situation, Background, Assessment and   Recommendations(SBAR). Information from the following report(s) SBAR, OR Summary, Procedure Summary, Intake/Output, MAR and Recent Results was reviewed with the receiving nurse. Lines:   Peripheral IV 10/08/21 Left Forearm (Active)   Site Assessment Clean, dry, & intact 10/11/21 1233   Phlebitis Assessment 0 10/11/21 1233   Infiltration Assessment 0 10/11/21 1233   Dressing Status Clean, dry, & intact 10/11/21 1233   Dressing Type Tape;Transparent 10/11/21 1233   Hub Color/Line Status Patent 10/11/21 1233   Alcohol Cap Used No 10/11/21 0600       Peripheral IV 10/09/21 Right;Superior Basilic (Active)   Site Assessment Clean, dry, & intact 10/11/21 1233   Phlebitis Assessment 0 10/11/21 1233   Infiltration Assessment 0 10/11/21 1233   Dressing Status Clean, dry, & intact 10/11/21 1233   Dressing Type Tape;Transparent 10/11/21 1233   Hub Color/Line Status Patent 10/11/21 1233   Alcohol Cap Used No 10/11/21 0600        Opportunity for questions and clarification was provided. Patient transported with:   O2 @ 2 liters    VTE prophylaxis orders have not been written for Camilla Ax. Patient and family given floor number and nurses name. Family updated re: pt status after security code verified.

## 2021-10-11 NOTE — ANESTHESIA PROCEDURE NOTES
Peripheral Block    Start time: 10/11/2021 8:11 AM  End time: 10/11/2021 8:12 AM  Performed by: Sonja Hernandez MD  Authorized by: Sonja Hernandez MD       Pre-procedure: Indications: at surgeon's request and post-op pain management    Preanesthetic Checklist: patient identified, risks and benefits discussed, site marked, timeout performed, anesthesia consent given and patient being monitored    Timeout Time: 08:11 EDT          Block Type:   Block Type:   Adductor canal  Laterality:  Left  Monitoring:  Standard ASA monitoring, continuous pulse ox, heart rate, responsive to questions, oxygen and frequent vital sign checks  Injection Technique:  Single shot  Procedures: ultrasound guided    Patient Position: supine  Prep: chlorhexidine    Location:  Mid thigh  Needle Type:  Stimuplex  Needle Gauge:  20 G  Needle Localization:  Ultrasound guidance  Medication Injected:  Ropivacaine (PF) (NAROPIN)(0.5%) 5 mg/mL injection, 15 mL  dexamethasone (DECADRON) 4 mg/mL injection, 4 mg  Med Admin Time: 10/11/2021 8:12 AM    Assessment:  Number of attempts:  1  Injection Assessment:  Incremental injection every 5 mL, local visualized surrounding nerve on ultrasound, negative aspiration for blood, no intravascular symptoms, no paresthesia and ultrasound image on chart  Patient tolerance:  Patient tolerated the procedure well with no immediate complications

## 2021-10-11 NOTE — PROGRESS NOTES
[facility]   Progress Note    Patient: Patrice Win MRN: [id number]  SSN: [id number]    YOB: 1945  Age: 77 y.o. Sex: male      Admit Date: 10/5/2021    LOS: 6 days     Subjective:     POD 5 from R foot I&D. We discussed and patient ready for Right midfoot amputation    Objective:     Vitals:    10/10/21 1630 10/10/21 1902 10/11/21 0543 10/11/21 0623   BP: (!) 166/59 (!) 150/65 (!) 140/77 (!) 198/81   Pulse: 78 73 69 73   Resp: 18   18   Temp: 98.5 °F (36.9 °C) 98.5 °F (36.9 °C)  98.1 °F (36.7 °C)   SpO2: 95% 95%  97%   Weight:    272 lb (123.4 kg)   Height:            Intake and Output:  Current Shift: No intake/output data recorded. Last three shifts: 10/09 1901 - 10/11 0700  In: 170 [P.O.:170]  Out: 1075 [Urine:1075]    Physical Exam:   RLE: wound vac in place w/ good seal. No spreading erythema. Wound on bottom of foot still present. Good rom at ankle. NO silt in foot.     Lab/Data Review:  BMP:   Lab Results   Component Value Date/Time     (L) 10/11/2021 03:41 AM    K 3.9 10/11/2021 03:41 AM     10/11/2021 03:41 AM    CO2 26 10/11/2021 03:41 AM    AGAP 7 10/11/2021 03:41 AM     (H) 10/11/2021 03:41 AM    BUN 18 10/11/2021 03:41 AM    CREA 1.32 10/11/2021 03:41 AM    GFRAA >60 10/11/2021 03:41 AM    GFRNA 56 (L) 10/11/2021 03:41 AM     CBC:   Lab Results   Component Value Date/Time    WBC 8.3 10/11/2021 03:41 AM    HGB 12.4 (L) 10/11/2021 03:41 AM    HCT 37.1 (L) 10/11/2021 03:41 AM     10/11/2021 03:41 AM          Assessment:     Principal Problem:    Abscess of left foot (10/5/2021)    Active Problems:    DM type 2 (diabetes mellitus, type 2) ([facility] (10/5/2021)      HTN (hypertension) (10/5/2021)      Sleep apnea (10/5/2021)      COPD (chronic obstructive pulmonary disease) ([facility] (10/5/2021)      Polymyalgia rheumatica ([facility] (10/5/2021)      Hyperlipidemia (10/5/2021)      Diabetic neuropathy associated with type 2 diabetes mellitus ([facility] (10/5/2021)      Class 1 obesity in adult (10/5/2021)      CAD (coronary artery disease) (10/5/2021)      H/O cardiac pacemaker (10/5/2021)        Plan:     1: WB status - NWB RLE  2: DVT px - hold heparin drip  3: ABX - continue vancomycin  4: I discussed in detail multiple times with the patient the surgical options. We can proceed with R midfoot amputation vs. Revision I&D w/ subsequent wound care. He states he has been in wound care, therapy and treatment for this plantar ulcer for over 5 years and he is tired of dealing with it. He wants the problem solved. In my opinion that means an amputation at the midfoot level. I discussed this is the best way to get coverage and close his wound. He understands wound healing complications, future amputation needs, and loss of function that is associated with an amputation. He agrees with amputation.     Signed By: Glen Gomes MD     October 11, 2021

## 2021-10-11 NOTE — ANESTHESIA PREPROCEDURE EVALUATION
Relevant Problems   RESPIRATORY SYSTEM   (+) COPD (chronic obstructive pulmonary disease) (HCC)   (+) Sleep apnea      CARDIOVASCULAR   (+) CAD (coronary artery disease)   (+) HTN (hypertension)      ENDOCRINE   (+) Class 1 obesity in adult   (+) DM type 2 (diabetes mellitus, type 2) (HCC)       Anesthetic History   No history of anesthetic complications            Review of Systems / Medical History  Patient summary reviewed and pertinent labs reviewed    Pulmonary    COPD (Asbestosis - mild): mild    Sleep apnea: CPAP           Neuro/Psych             Comments: neuropathy Cardiovascular    Hypertension: well controlled        Dysrhythmias (s/p ablation 2017) : atrial fibrillation  Pacemaker (pacer for bradycardia), CAD, PAD, cardiac stents (At least 10 stents - Most recent 6/18) and hyperlipidemia    Exercise tolerance: <4 METS:     Comments: Normally takes xarelto - has been held and on a heparin drip while inpatient  - stopped this AM    GI/Hepatic/Renal     GERD: well controlled          Comments: H/o renal artery stent Endo/Other    Diabetes: using insulin    Obesity, arthritis and cancer (prostate)     Other Findings   Comments: On chronic low-dose steroids for PMR         Physical Exam    Airway  Mallampati: II  TM Distance: > 6 cm  Neck ROM: normal range of motion   Mouth opening: Normal     Cardiovascular    Rhythm: regular  Rate: normal         Dental  No notable dental hx       Pulmonary  Breath sounds clear to auscultation               Abdominal  GI exam deferred       Other Findings            Anesthetic Plan    ASA: 3  Anesthesia type: total IV anesthesia      Post-op pain plan if not by surgeon: peripheral nerve block single    Induction: Intravenous  Anesthetic plan and risks discussed with: Patient and Family

## 2021-10-11 NOTE — ANESTHESIA PROCEDURE NOTES
Peripheral Block    Start time: 10/11/2021 8:07 AM  End time: 10/11/2021 8:10 AM  Performed by: Rusty Royal MD  Authorized by: Rusty Royal MD       Pre-procedure:    Indications: at surgeon's request and post-op pain management    Preanesthetic Checklist: patient identified, risks and benefits discussed, site marked, timeout performed, anesthesia consent given and patient being monitored    Timeout Time: 08:07 EDT          Block Type:   Block Type:  Popliteal  Laterality:  Left  Monitoring:  Standard ASA monitoring, continuous pulse ox, heart rate, oxygen, responsive to questions and frequent vital sign checks  Injection Technique:  Single shot  Procedures: ultrasound guided    Patient Position: supine  Prep: chlorhexidine    Location:  Lower thigh  Needle Type:  Stimuplex  Needle Gauge:  20 G  Needle Localization:  Ultrasound guidance  Medication Injected:  Ropivacaine (PF) (NAROPIN)(0.5%) 5 mg/mL injection, 20 mL  dexamethasone (DECADRON) 4 mg/mL injection, 4 mg  Med Admin Time: 10/11/2021 8:10 AM    Assessment:  Number of attempts:  1  Injection Assessment:  Incremental injection every 5 mL, local visualized surrounding nerve on ultrasound, negative aspiration for blood, no paresthesia, ultrasound image on chart and no intravascular symptoms  Patient tolerance:  Patient tolerated the procedure well with no immediate complications

## 2021-10-11 NOTE — PROGRESS NOTES
[facility]ist Progress Note     Name:  Carlitos Bai  Age:76 y.o. Sex:male   :  1945    MRN:  [id number]     Admit Date:  10/5/2021    Reason for Admission:  Abscess of left foot [L02.612]    Assessment & Plan     -Left plantar foot ulcer with dorsal abscess   10/6/2021  s/pPartial excision first metatarsal head with bone biopsy   Debridement of the left foot abscess  Patient presently has wound VAC  Initial wound culture in ER-positive for Klebsiella oxytoca, E. coli, staph aureus, Enterococcus  Repeat wound culture postop showed gram-negative rods  Patient presently on Vanco and cefepime  10/10/2021  Continue heparin drip continue vancomycin and cefepime, patient for surgery tomorrow 10/11/2021. N.p.o. after midnight  We will consult ID tomorrow. 10/11/2021  Status post left midfoot amputation. As per Dr. Ekaterina Trinidad no dressing change until patient follows up with him in 2 to 3 weeks. ID consulted for antibiotic recommendation.    --COPD exacerbation  10/11/2021  Mild bilateral wheezing and cough on taking a deep breath. We will hold patient prednisone. Start him on Solu-Medrol 40 mg every 8 hourly. DuoNebs every 4 hourly    -Mild MICHELLE  Vancomycin discontinued  Continued on LR at 100 cc/h  Held losartan  Repeat BMP in the morning.      -History of A. fib on Xarelto/history of pacemaker  Presently Xarelto held  Started on heparin drip  Continue sotalol  10/10/2021  Cont heparin drip  10/11/2021  Spoke with Dr. Ekaterina Trinidad, okay to start heparin drip from 8 PM tonight.   Okay to start patient on Xarelto from tomorrow morning.    -Diabetes mellitus type 2/diabetic neuropathy bilateral feet  On Tresiba at home  Continue on sliding scale and Lantus    -Hyperlipidemia  Continue Zetia    -Hypertension  Continue amlodipine.    -Sleep apnea  Continue CPAP    -History of polymyalgia rheumatica  Continue prednisone 5 mg twice daily  10/10/2021  We will need a stress dose of Solu-Cortef prior to surgery tomorrow.    -Obesity, BMI of 33.12    Diet:  None  DVT PPx:heparin drip  Code: Full Code        Hospital Course/Subjective:   Lyric Arias is a 77 y.o. male with medical history of chronic left foot diabetic ulcer, polymyalgia rheumatica on prednisone, A. fib on sotalol, s/p pacemaker, COPD, diabetes mellitus type 2 on Tresiba, hypertension only on losartan/amlodipine, hyperlipidemia, obesity, A. fib on Xarelto, coronary disease with multiple stents acid reflux, diabetic neuropathy and obstructive sleep apnea on CPAP    Admitted for left plantar foot ulcer and dorsal abscess  S/p partial excision first metatarsal head with bone biopsy and debridement of the left foot abscess on 10/6/2021. Ortho planning for transmetatarsal/midfoot amputation either on Sunday or Monday. On 10/11/2021 patient had left midfoot amputation. Subjective/24 hr Events (10/11/21):  10/8/2021  In bed, no complaints, has wound VAC to the left foot, has neuropathy, no pain. 10/9/2021  No complaints  Wound VAC to the left foot  As per patient surgery on Monday  On heparin drip since 10/8/2021    10/10/2021  Says doing fine, mild cough. Says the nursing staff listen to his lung, felt that some crepitation over the left lung base, was concerned about it. Not requiring any oxygen, not in any respiratory distress. On broad-spectrum antibiotic with vancomycin and cefepime. 10/11/2021  Patient presently gone for surgery  Mild elevated blood pressure on vitals  Mildly elevated creatinine  Called over to discontinue vancomycin  Ordered LR bolus, spoke to anesthesia, can be given after the procedure. Already on IV fluids LR at 100 cc/h. Saw later this after at PACU. Says doing ok  Mild cough  . Review of Systems: 14 point review of systems is otherwise negative with the exception of the elements mentioned above.     Objective:     Patient Vitals for the past 24 hrs:   Temp Pulse Resp BP SpO2   10/11/21 1352 97.8 °F (36.6 °C) 75 19 (!) 157/91 --   10/11/21 1305 97.5 °F (36.4 °C) 69 18 (!) 180/83 98 %   10/11/21 1300 -- 69 18 (!) 175/79 97 %   10/11/21 1255 -- 69 18 (!) 162/74 98 %   10/11/21 1250 -- 69 18 (!) 177/81 97 %   10/11/21 1245 -- 69 18 (!) 162/76 97 %   10/11/21 1240 -- 69 18 (!) 163/58 99 %   10/11/21 1233 98 °F (36.7 °C) 70 18 (!) 155/76 98 %   10/11/21 1230 -- 69 18 (!) 155/76 98 %   10/11/21 0827 -- 69 15 (!) 145/69 99 %   10/11/21 0822 -- 70 17 (!) 159/92 99 %   10/11/21 0817 -- (!) 59 15 (!) 145/70 99 %   10/11/21 0812 -- 69 14 (!) 144/69 98 %   10/11/21 0807 -- 69 15 -- 98 %   10/11/21 0623 98.1 °F (36.7 °C) 73 18 (!) 198/81 97 %   10/11/21 0543 -- 69 -- (!) 140/77 --   10/10/21 1902 98.5 °F (36.9 °C) 73 -- (!) 150/65 95 %   10/10/21 1630 98.5 °F (36.9 °C) 78 18 (!) 166/59 95 %     Oxygen Therapy  O2 Sat (%): 98 % (10/11/21 1305)  Pulse via Oximetry: 70 beats per minute (10/11/21 1305)  O2 Device: None (Room air) (10/11/21 1305)  Skin Assessment: Clean, dry, & intact (10/10/21 1902)  O2 Flow Rate (L/min): 3 l/min (10/11/21 1233)    Body mass index is 34 kg/m². Physical Exam:   General:     alert, awake, no acute distress. Well nourished  HENT:   normocephalic, atraumatic. Oropharynx clear with moist mucous membranes and normal hard/soft palate  Eyes:   anicteric sclerae, moist conjunctiva, PERRL, EOMI  Neck:    supple, non-tender. Trachea midline. Lungs:   Bilateral wheezing, mild coarse breath sounds   cardiac:   RRR, Normal S1 and S2. No S3, S4 or murmurs. Abdomen:   Soft, non distended, nontender, +BS, no guarding/rebound. Obese, BMI 33.12  Extremities:   Dressing to the left foot. nontender secondary neuropathy   skin:   Dressing to the left foot. Neuro:   AAOx3.  No gross focal neurological deficit,  Cranial nerves II-XII grossly intact  Neuropathy bilateral of both feet  Psychiatric:  No anxiety, calm, cooperative        Data Review:  I have reviewed all labs, meds, and studies from the last 24 hours:    Labs:  Recent Results (from the past 24 hour(s))   HEPARIN XA UFH    Collection Time: 10/10/21  3:29 PM   Result Value Ref Range    Heparin Xa UFH 0.17 (L) 0.3 - 0.7 IU/mL   GLUCOSE, POC    Collection Time: 10/10/21  4:34 PM   Result Value Ref Range    Glucose (POC) 170 (H) 65 - 100 mg/dL    Performed by Kathy    GLUCOSE, POC    Collection Time: 10/11/21 12:18 AM   Result Value Ref Range    Glucose (POC) 162 (H) 65 - 100 mg/dL    Performed by Miguel Angel    HEPARIN XA UFH    Collection Time: 10/11/21 12:26 AM   Result Value Ref Range    Heparin Xa UFH 0.67 0.3 - 0.7 IU/mL   CBC WITH AUTOMATED DIFF    Collection Time: 10/11/21  3:41 AM   Result Value Ref Range    WBC 8.3 4.3 - 11.1 K/uL    RBC 3.98 (L) 4.23 - 5.6 M/uL    HGB 12.4 (L) 13.6 - 17.2 g/dL    HCT 37.1 (L) 41.1 - 50.3 %    MCV 93.2 79.6 - 97.8 FL    MCH 31.2 26.1 - 32.9 PG    MCHC 33.4 31.4 - 35.0 g/dL    RDW 12.1 11.9 - 14.6 %    PLATELET 308 145 - 559 K/uL    MPV 9.0 (L) 9.4 - 12.3 FL    ABSOLUTE NRBC 0.00 0.0 - 0.2 K/uL    DF AUTOMATED      NEUTROPHILS 75 43 - 78 %    LYMPHOCYTES 15 13 - 44 %    MONOCYTES 8 4.0 - 12.0 %    EOSINOPHILS 1 0.5 - 7.8 %    BASOPHILS 1 0.0 - 2.0 %    IMMATURE GRANULOCYTES 1 0.0 - 5.0 %    ABS. NEUTROPHILS 6.2 1.7 - 8.2 K/UL    ABS. LYMPHOCYTES 1.3 0.5 - 4.6 K/UL    ABS. MONOCYTES 0.6 0.1 - 1.3 K/UL    ABS. EOSINOPHILS 0.1 0.0 - 0.8 K/UL    ABS. BASOPHILS 0.1 0.0 - 0.2 K/UL    ABS. IMM.  GRANS. 0.1 0.0 - 0.5 K/UL   METABOLIC PANEL, BASIC    Collection Time: 10/11/21  3:41 AM   Result Value Ref Range    Sodium 137 (L) 138 - 145 mmol/L    Potassium 3.9 3.5 - 5.1 mmol/L    Chloride 104 98 - 107 mmol/L    CO2 26 21 - 32 mmol/L    Anion gap 7 7 - 16 mmol/L    Glucose 147 (H) 65 - 100 mg/dL    BUN 18 8 - 23 MG/DL    Creatinine 1.32 0.8 - 1.5 MG/DL    GFR est AA >60 >60 ml/min/1.73m2    GFR est non-AA 56 (L) >60 ml/min/1.73m2    Calcium 8.7 8.3 - 10.4 MG/DL   COVID-19 RAPID TEST    Collection Time: 10/11/21  6:04 AM Result Value Ref Range    Specimen source NASAL      COVID-19 rapid test Not detected NOTD     GLUCOSE, POC    Collection Time: 10/11/21  7:54 AM   Result Value Ref Range    Glucose (POC) 117 (H) 65 - 100 mg/dL    Performed by [facility]        All Micro Results     Procedure Component Value Units Date/Time    CULTURE, ANAEROBIC [[id number]] Collected: 10/06/21 1444    Order Status: Completed Specimen: Foot, left Updated: 10/11/21 0905     Special Requests: NO SPECIAL REQUESTS        Culture result:       NO ANAEROBIC GROWTH IN 4 DAYS          CULTURE, ANAEROBIC [[id number]] Collected: 10/06/21 1446    Order Status: Completed Specimen: Foot, left Updated: 10/11/21 0901     Special Requests: NO SPECIAL REQUESTS        Culture result:       NO ANAEROBES ISOLATED 4 DAYS          CULTURE, WOUND Conway Bane STAIN [[id number]]  (Abnormal) Collected: 10/06/21 1445    Order Status: Completed Specimen: Foot, left Updated: 10/11/21 0741     Special Requests: NO SPECIAL REQUESTS        GRAM STAIN 0 TO 3 WBCS SEEN PER OIF      NO DEFINITE ORGANISM SEEN        Culture result: SCANT GRAM NEGATIVE RODS               SCANT (2ND COLONY TYPE/STRAIN) GRAM NEGATIVE RODS                  SCANT PRESUMPTIVE ENTEROCOCCUS SPECIES                  SCANT STAPHYLOCOCCUS AUREUS            REFER TO [address] [id number] AND [id number] FOR ID AND SUSCEPTIBILITY    CULTURE, Daren Welch STAIN [[id number]]  (Abnormal)  (Susceptibility) Collected: 10/06/21 1446    Order Status: Completed Specimen: Foot, left Updated: 10/11/21 0739     Special Requests: NO SPECIAL REQUESTS        GRAM STAIN 2 TO 20 WBCS SEEN PER OIF      NO DEFINITE ORGANISM SEEN        Culture result:       SCANT GRAM NEGATIVE RODS REFER TO ACC [id number] FOR ID AND SUSCEPTIBILITY                  SCANT SECOND GRAM NEGATIVE RODS REFER TO ACC [id number] FOR ID AND SUSCEPTIBILITY                  SCANT ENTEROCOCCUS FAECALIS GROUP D                  SCANT STAPHYLOCOCCUS AUREUS          CULTURE, WOUND W Alfonso Nasreen STAIN [[id number]]  (Abnormal)  (Susceptibility) Collected: 10/06/21 1444    Order Status: Completed Specimen: Foot, left Updated: 10/11/21 0737     Special Requests: NO SPECIAL REQUESTS        GRAM STAIN 5 TO 15 WBCS SEEN PER OIF      NO DEFINITE ORGANISM SEEN        Culture result: LIGHT KLEBSIELLA OXYTOCA         LIGHT ESCHERICHIA COLI               SCANT STAPHYLOCOCCUS AUREUS          COVID-19 RAPID TEST [[id number]] Collected: 10/11/21 0604    Order Status: Completed Specimen: Nasopharyngeal Updated: 10/11/21 0701     Specimen source NASAL        Comment: RAPID ONLY        COVID-19 rapid test Not detected        Comment:      The specimen is NEGATIVE for SARS-CoV-2, the novel coronavirus associated with COVID-19. A negative result does not rule out COVID-19. This test has been authorized by the FDA under an Emergency Use Authorization (EUA) for use by authorized laboratories.         Fact sheet for Healthcare Providers: [place] ([employer][email or web address]  Fact sheet for Patients: Speed Dating by [email or web address] [email or web address]       Methodology: Isothermal Nucleic Acid Amplification         BLOOD CULTURE [[id number]] Collected: 10/05/21 1652    Order Status: Completed Specimen: Blood Updated: 10/10/21 1019     Special Requests: --        NO SPECIAL REQUESTS  LEFT  Antecubital       Culture result: NO GROWTH 5 DAYS       BLOOD CULTURE [[id number]] Collected: 10/05/21 1754    Order Status: Completed Specimen: Blood Updated: 10/10/21 1019     Special Requests: --        NO SPECIAL REQUESTS  RIGHT  Antecubital       Culture result: NO GROWTH 5 DAYS       CULTURE, WOUND Weyman Prima STAIN [[id number]]  (Abnormal)  (Susceptibility) Collected: 10/05/21 1738    Order Status: Completed Specimen: Wound from Foot Updated: 10/09/21 0852     Special Requests: LEFT FOOT        GRAM STAIN 0 TO 6 WBC'S/OIF      FEW GRAM NEGATIVE RODS        Culture result:       MODERATE KLEBSIELLA OXYTOCA            MODERATE ESCHERICHIA COLI STAPHYLOCOCCUS AUREUS ISOLATED FROM BROTH ONLY                  ENTEROCOCCUS FAECALIS GROUP D ISOLATED FROM BROTH ONLY          CULTURE, ANAEROBIC [[id number]] Collected: 10/06/21 1445    Order Status: Completed Specimen: Foot, left Updated: 10/08/21 0841     Special Requests: NO SPECIAL REQUESTS        Culture result:       SUBCULTURE IS NECESSARY TO DETERMINE PRESENCE OR ABSENCE OF ANAEROBIC BACTERIA IN THIS CULTURE. FURTHER REPORT TO FOLLOW AFTER INCUBATION OF SUBCULTURE. Lillian Pile STAIN [[id number]] Collected: 10/06/21 1500    Order Status: Canceled Specimen: Wound Drainage     GRAM STAIN [[id number]] Collected: 10/06/21 1500    Order Status: Canceled Specimen: Wound     GRAM STAIN [[id number]] Collected: 10/06/21 1500    Order Status: Canceled Specimen: Wound           EKG Results     Procedure 720 Value Units Date/Time    EKG, 12 LEAD, INITIAL [[id number]] Collected: 10/05/21 1658    Order Status: Completed Updated: 10/06/21 4293     Ventricular Rate 73 BPM      Atrial Rate 73 BPM      P-R Interval 138 ms      QRS Duration 84 ms      Q-T Interval 400 ms      QTC Calculation (Bezet) 440 ms      Calculated P Axis 74 degrees      Calculated R Axis 69 degrees      Calculated T Axis 70 degrees      Diagnosis --     Normal sinus rhythm  Normal ECG  No previous ECGs available  Confirmed by [facility] [place]  MD (UC), [facility]  ([id number]) on 10/6/2021 6:32:25 AM            Other Studies:  No results found.     Current Meds:   Current Facility-Administered Medications   Medication Dose Route Frequency    labetaloL (NORMODYNE;TRANDATE) injection 10 mg  10 mg IntraVENous Q6H PRN    lactated ringers bolus infusion 1,000 mL  1,000 mL IntraVENous ONCE    diphenhydrAMINE (BENADRYL) injection 12.5 mg  12.5 mg IntraVENous Q15MIN PRN    flumazeniL (ROMAZICON) 0.1 mg/mL injection 0.2 mg  0.2 mg IntraVENous Multiple    HYDROmorphone (DILAUDID) injection 0.5 mg  0.5 mg IntraVENous Q15MIN PRN    lactated Ringers infusion  75 mL/hr IntraVENous CONTINUOUS    naloxone (NARCAN) injection 0.1 mg  0.1 mg IntraVENous PRN    promethazine (PHENERGAN) with saline injection 6.25 mg  6.25 mg IntraVENous Q15MIN PRN    albuterol-ipratropium (DUO-NEB) 2.5 MG-0.5 MG/3 ML  3 mL Nebulization Q4H RT    methylPREDNISolone (PF) (SOLU-MEDROL) injection 40 mg  40 mg IntraVENous Q8H    heparin 25,000 units in dextrose 500 mL infusion  12-25 Units/kg/hr (Adjusted) IntraVENous TITRATE    guaiFENesin ER (MUCINEX) tablet 1,200 mg  1,200 mg Oral Q12H    amLODIPine (NORVASC) tablet 10 mg  10 mg Oral DAILY    HYDROcodone-acetaminophen (NORCO) 5-325 mg per tablet 1 Tablet  1 Tablet Oral Q4H PRN    insulin glargine (LANTUS) injection 30 Units  30 Units SubCUTAneous DAILY    sodium chloride (NS) flush 5-10 mL  5-10 mL IntraVENous PRN    ezetimibe (ZETIA) tablet 10 mg  10 mg Oral DAILY    famotidine (PEPCID) tablet 20 mg  20 mg Oral BID    fluticasone propionate (FLONASE) 50 mcg/actuation nasal spray 2 Spray  2 Spray Both Nostrils DAILY    nitroglycerin (NITROSTAT) tablet 0.4 mg  0.4 mg SubLINGual PRN    [Held by provider] predniSONE (DELTASONE) tablet 5 mg  5 mg Oral BID WITH MEALS    sotaloL (BETAPACE) tablet 40 mg  40 mg Oral BID    sodium chloride (NS) flush 5-40 mL  5-40 mL IntraVENous Q8H    sodium chloride (NS) flush 5-40 mL  5-40 mL IntraVENous PRN    acetaminophen (TYLENOL) tablet 650 mg  650 mg Oral Q6H PRN    Or    acetaminophen (TYLENOL) suppository 650 mg  650 mg Rectal Q6H PRN    polyethylene glycol (MIRALAX) packet 17 g  17 g Oral DAILY PRN    ondansetron (ZOFRAN ODT) tablet 4 mg  4 mg Oral Q8H PRN    Or    ondansetron (ZOFRAN) injection 4 mg  4 mg IntraVENous Q6H PRN    traZODone (DESYREL) tablet 75 mg  75 mg Oral QHS    [Held by provider] losartan (COZAAR) tablet 100 mg  100 mg Oral DAILY    cefepime (MAXIPIME) 1 g in 0.9% sodium chloride (MBP/ADV) 50 mL MBP  1 g IntraVENous Q12H    dextrose 40% (GLUTOSE) oral gel 1 Tube  15 g Oral PRN    glucagon (GLUCAGEN) injection 1 mg  1 mg IntraMUSCular PRN    dextrose (D50W) injection syrg 12.5-25 g  25-50 mL IntraVENous PRN    insulin lispro (HUMALOG) injection   SubCUTAneous AC&HS       Problem List:  Hospital Problems as of 10/11/2021 Date Reviewed: 10/5/2021        Codes Class Noted - Resolved POA    MICHELLE (acute kidney injury) ([place] [address].) ICD-10-CM: N17.9  ICD-9-CM: 584.9  10/11/2021 - Present Unknown        COPD with acute exacerbation ([place] [address].) ICD-10-CM: J44.1  ICD-9-CM: 491.21  10/11/2021 - Present Unknown        * (Principal) Abscess of left foot ICD-10-CM: I93.339  ICD-9-CM: 682.7  10/5/2021 - Present Unknown        DM type 2 (diabetes mellitus, type 2) ([place] [address].) ICD-10-CM: E11.9  ICD-9-CM: 250.00  10/5/2021 - Present Unknown        HTN (hypertension) ICD-10-CM: I10  ICD-9-CM: 401.9  10/5/2021 - Present Unknown        Sleep apnea ICD-10-CM: G47.30  ICD-9-CM: 780.57  10/5/2021 - Present Unknown        COPD (chronic obstructive pulmonary disease) ([place] [address].) ICD-10-CM: J44.9  ICD-9-CM: 496  10/5/2021 - Present Unknown        Polymyalgia rheumatica ([place] [address].) ICD-10-CM: M35.3  ICD-9-CM: 381  10/5/2021 - Present Unknown        Hyperlipidemia ICD-10-CM: E78.5  ICD-9-CM: 272.4  10/5/2021 - Present Unknown        Diabetic neuropathy associated with type 2 diabetes mellitus ([place] [address].) ICD-10-CM: E11.40  ICD-9-CM: 250.60, 357.2  10/5/2021 - Present Unknown        Class 1 obesity in adult ICD-10-CM: E66.9  ICD-9-CM: 278.00  10/5/2021 - Present Unknown        CAD (coronary artery disease) ICD-10-CM: I25.10  ICD-9-CM: 414.00  10/5/2021 - Present Unknown        H/O cardiac pacemaker ICD-10-CM: Z95.0  ICD-9-CM: V12.50, V45.01  10/5/2021 - Present Unknown                 Signed By: Pro Santos MD   [facility] Hospitalist Service    October 11, 2021  3:31 PM

## 2021-10-11 NOTE — PROGRESS NOTES
Pt is currently off floor for scheduled partial foot amputation. Will follow needs after surgery. CM to continue to follow and monitor for any further needs.

## 2021-10-12 LAB
ANION GAP SERPL CALC-SCNC: 6 MMOL/L (ref 7–16)
BUN SERPL-MCNC: 21 MG/DL (ref 8–23)
CALCIUM SERPL-MCNC: 8.7 MG/DL (ref 8.3–10.4)
CHLORIDE SERPL-SCNC: 103 MMOL/L (ref 98–107)
CO2 SERPL-SCNC: 26 MMOL/L (ref 21–32)
CREAT SERPL-MCNC: 1.23 MG/DL (ref 0.8–1.5)
GLUCOSE BLD STRIP.AUTO-MCNC: 177 MG/DL (ref 65–100)
GLUCOSE BLD STRIP.AUTO-MCNC: 179 MG/DL (ref 65–100)
GLUCOSE BLD STRIP.AUTO-MCNC: 241 MG/DL (ref 65–100)
GLUCOSE BLD STRIP.AUTO-MCNC: 266 MG/DL (ref 65–100)
GLUCOSE BLD STRIP.AUTO-MCNC: 287 MG/DL (ref 65–100)
GLUCOSE BLD STRIP.AUTO-MCNC: 361 MG/DL (ref 65–100)
GLUCOSE SERPL-MCNC: 277 MG/DL (ref 65–100)
POTASSIUM SERPL-SCNC: 4.5 MMOL/L (ref 3.5–5.1)
SERVICE CMNT-IMP: ABNORMAL
SODIUM SERPL-SCNC: 135 MMOL/L (ref 138–145)
UFH PPP CHRO-ACNC: <0.1 IU/ML (ref 0.3–0.7)

## 2021-10-12 PROCEDURE — 2709999900 HC NON-CHARGEABLE SUPPLY

## 2021-10-12 PROCEDURE — 97530 THERAPEUTIC ACTIVITIES: CPT

## 2021-10-12 PROCEDURE — 82962 GLUCOSE BLOOD TEST: CPT

## 2021-10-12 PROCEDURE — 74011000258 HC RX REV CODE- 258: Performed by: FAMILY MEDICINE

## 2021-10-12 PROCEDURE — 74011250636 HC RX REV CODE- 250/636: Performed by: FAMILY MEDICINE

## 2021-10-12 PROCEDURE — 94640 AIRWAY INHALATION TREATMENT: CPT

## 2021-10-12 PROCEDURE — 94760 N-INVAS EAR/PLS OXIMETRY 1: CPT

## 2021-10-12 PROCEDURE — 65660000000 HC RM CCU STEPDOWN

## 2021-10-12 PROCEDURE — 74011636637 HC RX REV CODE- 636/637: Performed by: FAMILY MEDICINE

## 2021-10-12 PROCEDURE — 74011000250 HC RX REV CODE- 250: Performed by: FAMILY MEDICINE

## 2021-10-12 PROCEDURE — 74011250637 HC RX REV CODE- 250/637: Performed by: NURSE PRACTITIONER

## 2021-10-12 PROCEDURE — 97162 PT EVAL MOD COMPLEX 30 MIN: CPT

## 2021-10-12 PROCEDURE — 80048 BASIC METABOLIC PNL TOTAL CA: CPT

## 2021-10-12 PROCEDURE — 85520 HEPARIN ASSAY: CPT

## 2021-10-12 PROCEDURE — 74011250637 HC RX REV CODE- 250/637: Performed by: FAMILY MEDICINE

## 2021-10-12 PROCEDURE — 74011636637 HC RX REV CODE- 636/637: Performed by: NURSE PRACTITIONER

## 2021-10-12 PROCEDURE — 36415 COLL VENOUS BLD VENIPUNCTURE: CPT

## 2021-10-12 RX ORDER — LOSARTAN POTASSIUM 50 MG/1
100 TABLET ORAL DAILY
Status: DISCONTINUED | OUTPATIENT
Start: 2021-10-12 | End: 2021-10-13 | Stop reason: HOSPADM

## 2021-10-12 RX ORDER — HEPARIN SODIUM 5000 [USP'U]/ML
40 INJECTION, SOLUTION INTRAVENOUS; SUBCUTANEOUS ONCE
Status: COMPLETED | OUTPATIENT
Start: 2021-10-12 | End: 2021-10-12

## 2021-10-12 RX ADMIN — IPRATROPIUM BROMIDE AND ALBUTEROL SULFATE 3 ML: .5; 3 SOLUTION RESPIRATORY (INHALATION) at 08:26

## 2021-10-12 RX ADMIN — LOSARTAN POTASSIUM 100 MG: 50 TABLET, FILM COATED ORAL at 16:09

## 2021-10-12 RX ADMIN — HYDROCODONE BITARTRATE AND ACETAMINOPHEN 1 TABLET: 5; 325 TABLET ORAL at 16:18

## 2021-10-12 RX ADMIN — RIVAROXABAN 20 MG: 20 TABLET, FILM COATED ORAL at 09:29

## 2021-10-12 RX ADMIN — EZETIMIBE 10 MG: 10 TABLET ORAL at 08:35

## 2021-10-12 RX ADMIN — INSULIN LISPRO 3 UNITS: 100 INJECTION, SOLUTION INTRAVENOUS; SUBCUTANEOUS at 17:22

## 2021-10-12 RX ADMIN — SOTALOL HYDROCHLORIDE 40 MG: 80 TABLET ORAL at 17:22

## 2021-10-12 RX ADMIN — FLUTICASONE PROPIONATE 2 SPRAY: 50 SPRAY, METERED NASAL at 09:00

## 2021-10-12 RX ADMIN — Medication 10 ML: at 05:13

## 2021-10-12 RX ADMIN — INSULIN LISPRO 6 UNITS: 100 INJECTION, SOLUTION INTRAVENOUS; SUBCUTANEOUS at 08:36

## 2021-10-12 RX ADMIN — AMLODIPINE BESYLATE 10 MG: 10 TABLET ORAL at 08:35

## 2021-10-12 RX ADMIN — FAMOTIDINE 20 MG: 20 TABLET ORAL at 17:22

## 2021-10-12 RX ADMIN — INSULIN LISPRO 9 UNITS: 100 INJECTION, SOLUTION INTRAVENOUS; SUBCUTANEOUS at 12:31

## 2021-10-12 RX ADMIN — SOTALOL HYDROCHLORIDE 40 MG: 80 TABLET ORAL at 08:36

## 2021-10-12 RX ADMIN — Medication 10 ML: at 13:29

## 2021-10-12 RX ADMIN — INSULIN GLARGINE 30 UNITS: 100 INJECTION, SOLUTION SUBCUTANEOUS at 08:37

## 2021-10-12 RX ADMIN — FAMOTIDINE 20 MG: 20 TABLET ORAL at 08:36

## 2021-10-12 RX ADMIN — IPRATROPIUM BROMIDE AND ALBUTEROL SULFATE 3 ML: .5; 3 SOLUTION RESPIRATORY (INHALATION) at 15:00

## 2021-10-12 RX ADMIN — HEPARIN SODIUM 4000 UNITS: 5000 INJECTION INTRAVENOUS; SUBCUTANEOUS at 06:10

## 2021-10-12 RX ADMIN — METHYLPREDNISOLONE SODIUM SUCCINATE 40 MG: 40 INJECTION, POWDER, FOR SOLUTION INTRAMUSCULAR; INTRAVENOUS at 05:12

## 2021-10-12 RX ADMIN — GUAIFENESIN 1200 MG: 600 TABLET ORAL at 21:09

## 2021-10-12 RX ADMIN — CEFEPIME HYDROCHLORIDE 1 G: 1 INJECTION, POWDER, FOR SOLUTION INTRAMUSCULAR; INTRAVENOUS at 06:32

## 2021-10-12 RX ADMIN — CEFEPIME HYDROCHLORIDE 1 G: 1 INJECTION, POWDER, FOR SOLUTION INTRAMUSCULAR; INTRAVENOUS at 17:21

## 2021-10-12 RX ADMIN — IPRATROPIUM BROMIDE AND ALBUTEROL SULFATE 3 ML: .5; 3 SOLUTION RESPIRATORY (INHALATION) at 22:07

## 2021-10-12 RX ADMIN — TRAZODONE HYDROCHLORIDE 75 MG: 50 TABLET ORAL at 21:09

## 2021-10-12 RX ADMIN — INSULIN LISPRO 3 UNITS: 100 INJECTION, SOLUTION INTRAVENOUS; SUBCUTANEOUS at 21:09

## 2021-10-12 RX ADMIN — Medication 10 ML: at 21:10

## 2021-10-12 RX ADMIN — HYDROCODONE BITARTRATE AND ACETAMINOPHEN 1 TABLET: 5; 325 TABLET ORAL at 07:26

## 2021-10-12 RX ADMIN — GUAIFENESIN 1200 MG: 600 TABLET ORAL at 08:36

## 2021-10-12 RX ADMIN — HYDROCODONE BITARTRATE AND ACETAMINOPHEN 1 TABLET: 5; 325 TABLET ORAL at 21:09

## 2021-10-12 NOTE — PROGRESS NOTES
Problem: Falls - Risk of  Goal: *Absence of Falls  Description: Document Mraley Zavala Fall Risk and appropriate interventions in the flowsheet. Outcome: Progressing Towards Goal  Note: Fall Risk Interventions:  Mobility Interventions: Bed/chair exit alarm, OT consult for ADLs, Patient to call before getting OOB, PT Consult for mobility concerns         Medication Interventions: Bed/chair exit alarm, Patient to call before getting OOB, Teach patient to arise slowly    Elimination Interventions: Bed/chair exit alarm, Call light in reach              Problem: Patient Education: Go to Patient Education Activity  Goal: Patient/Family Education  Outcome: Progressing Towards Goal     Problem: Diabetes Self-Management  Goal: *Disease process and treatment process  Description: Define diabetes and identify own type of diabetes; list 3 options for treating diabetes. Outcome: Progressing Towards Goal  Goal: *Incorporating nutritional management into lifestyle  Description: Describe effect of type, amount and timing of food on blood glucose; list 3 methods for planning meals. Outcome: Progressing Towards Goal  Goal: *Incorporating physical activity into lifestyle  Description: State effect of exercise on blood glucose levels. Outcome: Progressing Towards Goal  Goal: *Developing strategies to promote health/change behavior  Description: Define the ABC's of diabetes; identify appropriate screenings, schedule and personal plan for screenings. Outcome: Progressing Towards Goal  Goal: *Using medications safely  Description: State effect of diabetes medications on diabetes; name diabetes medication taking, action and side effects. Outcome: Progressing Towards Goal  Goal: *Monitoring blood glucose, interpreting and using results  Description: Identify recommended blood glucose targets  and personal targets.   Outcome: Progressing Towards Goal  Goal: *Prevention, detection, treatment of acute complications  Description: List symptoms of hyper- and hypoglycemia; describe how to treat low blood sugar and actions for lowering  high blood glucose level. Outcome: Progressing Towards Goal  Goal: *Prevention, detection and treatment of chronic complications  Description: Define the natural course of diabetes and describe the relationship of blood glucose levels to long term complications of diabetes.   Outcome: Progressing Towards Goal  Goal: *Developing strategies to address psychosocial issues  Description: Describe feelings about living with diabetes; identify support needed and support network  Outcome: Progressing Towards Goal  Goal: *Insulin pump training  Outcome: Progressing Towards Goal  Goal: *Sick day guidelines  Outcome: Progressing Towards Goal  Goal: *Patient Specific Goal (EDIT GOAL, INSERT TEXT)  Outcome: Progressing Towards Goal     Problem: Patient Education: Go to Patient Education Activity  Goal: Patient/Family Education  Outcome: Progressing Towards Goal     Problem: Patient Education: Go to Patient Education Activity  Goal: Patient/Family Education  Outcome: Progressing Towards Goal

## 2021-10-12 NOTE — PROGRESS NOTES
[facility]ist Progress Note     Name:  Eric Padilla  Age:76 y.o. Sex:male   :  1945    MRN:  [id number]     Admit Date:  10/5/2021    Reason for Admission:  Abscess of left foot [L02.612]    Assessment & Plan     -Left plantar foot ulcer with dorsal abscess   10/6/2021  s/pPartial excision first metatarsal head with bone biopsy   Debridement of the left foot abscess  Patient presently has wound VAC  Initial wound culture in ER-positive for Klebsiella oxytoca, E. coli, staph aureus, Enterococcus  Repeat wound culture postop showed gram-negative rods  Patient presently on Vanco and cefepime  10/10/2021  Continue heparin drip continue vancomycin and cefepime, patient for surgery tomorrow 10/11/2021. N.p.o. after midnight  We will consult ID tomorrow. 10/11/2021  Status post left midfoot amputation. As per Dr. Cynthia Mir no dressing change until patient follows up with him in 2 to 3 weeks. ID consulted for antibiotic recommendation. 10/12/2021  ID evaluated, continue cefepime and gradually transition to Keflex and ciprofloxacin.      --COPD exacerbation  10/11/2021  Mild bilateral wheezing and cough on taking a deep breath. We will hold patient prednisone. Start him on Solu-Medrol 40 mg every 8 hourly. DuoNebs every 4 hourly  10/12/2021  Improved wheezing, de-escalate steroids to Solu-Medrol 40 daily  Continue breathing treatments    -Mild MICHELLE  Vancomycin discontinued  Continued on LR at 100 cc/h  Held losartan  Repeat BMP in the morning. 10/12/2021  Improving kidney functions      -History of A. fib on Xarelto/history of pacemaker  Presently Xarelto held  Started on heparin drip  Continue sotalol  10/10/2021  Cont heparin drip  10/11/2021  Spoke with Dr. Cynthia Mir, letty to start heparin drip from 8 PM tonight. Okay to start patient on Xarelto from tomorrow morning. 10/12/2021  DC heparin drip, start patient home dose of Xarelto.     -Diabetes mellitus type 2/diabetic neuropathy bilateral feet  On Tresiba at home  Continue on sliding scale and Lantus    -Hyperlipidemia  Continue Zetia    -Hypertension  Continue amlodipine.    -Sleep apnea  Continue CPAP    -History of polymyalgia rheumatica  Continue prednisone 5 mg twice daily  10/10/2021  We will need a stress dose of Solu-Cortef prior to surgery tomorrow.    -Obesity, BMI of 33.12    Diet:  DIET ADULT  DVT PPx:heparin drip  Code: Full Code        Hospital Course/Subjective:   Arturo Guardado is a 77 y.o. male with medical history of chronic left foot diabetic ulcer, polymyalgia rheumatica on prednisone, A. fib on sotalol, s/p pacemaker, COPD, diabetes mellitus type 2 on Tresiba, hypertension only on losartan/amlodipine, hyperlipidemia, obesity, A. fib on Xarelto, coronary disease with multiple stents acid reflux, diabetic neuropathy and obstructive sleep apnea on CPAP    Admitted for left plantar foot ulcer and dorsal abscess  S/p partial excision first metatarsal head with bone biopsy and debridement of the left foot abscess on 10/6/2021. Ortho planning for transmetatarsal/midfoot amputation either on Sunday or Monday. On 10/11/2021 patient had left midfoot amputation. Subjective/24 hr Events (10/12/21):  10/8/2021  In bed, no complaints, has wound VAC to the left foot, has neuropathy, no pain. 10/9/2021  No complaints  Wound VAC to the left foot  As per patient surgery on Monday  On heparin drip since 10/8/2021    10/10/2021  Says doing fine, mild cough. Says the nursing staff listen to his lung, felt that some crepitation over the left lung base, was concerned about it. Not requiring any oxygen, not in any respiratory distress. On broad-spectrum antibiotic with vancomycin and cefepime. 10/11/2021  Patient presently gone for surgery  Mild elevated blood pressure on vitals  Mildly elevated creatinine  Called over to discontinue vancomycin  Ordered LR bolus, spoke to anesthesia, can be given after the procedure.   Already on IV fluids LR at 100 cc/h. Saw later this after at PACU. Says doing ok  Mild cough    10/12/2021  Says doing fine, cough better  On heparin drip  Dressing intact left foot  . Review of Systems: 14 point review of systems is otherwise negative with the exception of the elements mentioned above. Objective:     Patient Vitals for the past 24 hrs:   Temp Pulse Resp BP SpO2   10/12/21 0829 -- -- -- -- 98 %   10/12/21 0755 97.8 °F (36.6 °C) 69 16 (!) 162/57 92 %   10/12/21 0358 97.9 °F (36.6 °C) 69 12 (!) 156/65 94 %   10/11/21 2345 98.1 °F (36.7 °C) 70 20 (!) 156/63 95 %   10/11/21 2053 98.4 °F (36.9 °C) 70 20 (!) 163/63 94 %   10/11/21 1947 -- -- -- -- 97 %   10/11/21 1615 97.8 °F (36.6 °C) 70 17 (!) 147/48 94 %   10/11/21 1506 -- -- -- -- 100 %   10/11/21 1352 97.8 °F (36.6 °C) 75 19 (!) 157/91 --   10/11/21 1305 97.5 °F (36.4 °C) 69 18 (!) 180/83 98 %   10/11/21 1300 -- 69 18 (!) 175/79 97 %   10/11/21 1255 -- 69 18 (!) 162/74 98 %   10/11/21 1250 -- 69 18 (!) 177/81 97 %   10/11/21 1245 -- 69 18 (!) 162/76 97 %   10/11/21 1240 -- 69 18 (!) 163/58 99 %   10/11/21 1233 98 °F (36.7 °C) 70 18 (!) 155/76 98 %   10/11/21 1230 -- 69 18 (!) 155/76 98 %     Oxygen Therapy  O2 Sat (%): 98 % (10/12/21 0829)  Pulse via Oximetry: 72 beats per minute (10/12/21 0829)  O2 Device: None (Room air) (10/12/21 0829)  Skin Assessment: Clean, dry, & intact (10/10/21 1902)  O2 Flow Rate (L/min): 0 l/min (10/11/21 1506)    Body mass index is 34 kg/m². Physical Exam:   General:     alert, awake, no acute distress. Well nourished  HENT:   normocephalic, atraumatic. Oropharynx clear with moist mucous membranes and normal hard/soft palate  Eyes:   anicteric sclerae, moist conjunctiva, PERRL, EOMI  Neck:    supple, non-tender. Trachea midline. Lungs:   Bilateral wheezing, mild coarse breath sounds   cardiac:   RRR, Normal S1 and S2. No S3, S4 or murmurs. Abdomen:   Soft, non distended, nontender, +BS, no guarding/rebound. Obese, BMI 33.12  Extremities:   Dressing to the left foot. nontender secondary neuropathy   skin:   Dressing to the left foot. Neuro:   AAOx3.  No gross focal neurological deficit,  Cranial nerves II-XII grossly intact  Neuropathy bilateral of both feet  Psychiatric:  No anxiety, calm, cooperative        Data Review:  I have reviewed all labs, meds, and studies from the last 24 hours:    Labs:  Recent Results (from the past 24 hour(s))   GLUCOSE, POC    Collection Time: 10/11/21  3:45 PM   Result Value Ref Range    Glucose (POC) 232 (H) 65 - 100 mg/dL    Performed by JahMARY GRACE    HEPARIN XA UFH    Collection Time: 10/11/21  8:39 PM   Result Value Ref Range    Heparin Xa UFH <0.10 (L) 0.3 - 0.7 IU/mL   GLUCOSE, POC    Collection Time: 10/11/21 10:25 PM   Result Value Ref Range    Glucose (POC) 410 (H) 65 - 100 mg/dL    Performed by Code ScoutsDN    GLUCOSE, POC    Collection Time: 10/11/21 11:42 PM   Result Value Ref Range    Glucose (POC) 371 (H) 65 - 100 mg/dL    Performed by VormetricurADN    GLUCOSE, POC    Collection Time: 10/12/21 12:59 AM   Result Value Ref Range    Glucose (POC) 361 (H) 65 - 100 mg/dL    Performed by Code ScoutsDN    HEPARIN XA UFH    Collection Time: 10/12/21  4:14 AM   Result Value Ref Range    Heparin Xa UFH <0.10 (L) 0.3 - 0.7 IU/mL   METABOLIC PANEL, BASIC    Collection Time: 10/12/21  4:14 AM   Result Value Ref Range    Sodium 135 (L) 138 - 145 mmol/L    Potassium 4.5 3.5 - 5.1 mmol/L    Chloride 103 98 - 107 mmol/L    CO2 26 21 - 32 mmol/L    Anion gap 6 (L) 7 - 16 mmol/L    Glucose 277 (H) 65 - 100 mg/dL    BUN 21 8 - 23 MG/DL    Creatinine 1.23 0.8 - 1.5 MG/DL    GFR est AA >60 >60 ml/min/1.73m2    GFR est non-AA >60 >60 ml/min/1.73m2    Calcium 8.7 8.3 - 10.4 MG/DL   GLUCOSE, POC    Collection Time: 10/12/21  4:55 AM   Result Value Ref Range    Glucose (POC) 266 (H) 65 - 100 mg/dL    Performed by Shanelle Zapata 55, POC    Collection Time: 10/12/21  7:51 AM Result Value Ref Range    Glucose (POC) 241 (H) 65 - 100 mg/dL    Performed by Jai Mayfield        All Micro Results     Procedure Component Value Units Date/Time    CULTURE, ANAEROBIC [[id number]] Collected: 10/06/21 1444    Order Status: Completed Specimen: Foot, left Updated: 10/11/21 0905     Special Requests: NO SPECIAL REQUESTS        Culture result:       NO ANAEROBIC GROWTH IN 4 DAYS          CULTURE, ANAEROBIC [[id number]] Collected: 10/06/21 1446    Order Status: Completed Specimen: Foot, left Updated: 10/11/21 0901     Special Requests: NO SPECIAL REQUESTS        Culture result:       NO ANAEROBES ISOLATED 4 DAYS          CULTURE, WOUND Saima Restorationist STAIN [[id number]]  (Abnormal) Collected: 10/06/21 1445    Order Status: Completed Specimen: Foot, left Updated: 10/11/21 0741     Special Requests: NO SPECIAL REQUESTS        GRAM STAIN 0 TO 3 WBCS SEEN PER OIF      NO DEFINITE ORGANISM SEEN        Culture result: SCANT GRAM NEGATIVE RODS               SCANT (2ND COLONY TYPE/STRAIN) GRAM NEGATIVE RODS                  SCANT PRESUMPTIVE ENTEROCOCCUS SPECIES                  SCANT STAPHYLOCOCCUS AUREUS            REFER TO JAYS [id number] AND [id number] FOR ID AND SUSCEPTIBILITY    CULTURE, Rita Bigger STAIN [[id number]]  (Abnormal)  (Susceptibility) Collected: 10/06/21 1446    Order Status: Completed Specimen: Foot, left Updated: 10/11/21 0739     Special Requests: NO SPECIAL REQUESTS        GRAM STAIN 2 TO 20 WBCS SEEN PER OIF      NO DEFINITE ORGANISM SEEN        Culture result:       SCANT GRAM NEGATIVE RODS REFER TO ACC [id number] FOR ID AND SUSCEPTIBILITY                  SCANT SECOND GRAM NEGATIVE RODS REFER TO JAYS [id number] FOR ID AND SUSCEPTIBILITY                  SCANT ENTEROCOCCUS FAECALIS GROUP D                  SCANT STAPHYLOCOCCUS AUREUS          CULTURE, Rita Bigger STAIN [[id number]]  (Abnormal)  (Susceptibility) Collected: 10/06/21 1444    Order Status: Completed Specimen: Foot, left Updated: 10/11/21 2725     Special Requests: NO SPECIAL REQUESTS        GRAM STAIN 5 TO 15 WBCS SEEN PER OIF      NO DEFINITE ORGANISM SEEN        Culture result: LIGHT KLEBSIELLA OXYTOCA         LIGHT ESCHERICHIA COLI               SCANT STAPHYLOCOCCUS AUREUS          COVID-19 RAPID TEST [[id number]] Collected: 10/11/21 0604    Order Status: Completed Specimen: Nasopharyngeal Updated: 10/11/21 0701     Specimen source NASAL        Comment: RAPID ONLY        COVID-19 rapid test Not detected        Comment:      The specimen is NEGATIVE for SARS-CoV-2, the novel coronavirus associated with COVID-19. A negative result does not rule out COVID-19. This test has been authorized by the FDA under an Emergency Use Authorization (EUA) for use by authorized laboratories.         Fact sheet for Healthcare Providers: [email or web address]nz  Fact sheet for Patients: [employer]       Methodology: Isothermal Nucleic Acid Amplification         BLOOD CULTURE [[id number]] Collected: 10/05/21 1652    Order Status: Completed Specimen: Blood Updated: 10/10/21 1019     Special Requests: --        NO SPECIAL REQUESTS  LEFT  Antecubital       Culture result: NO GROWTH 5 DAYS       BLOOD CULTURE [[id number]] Collected: 10/05/21 1754    Order Status: Completed Specimen: Blood Updated: 10/10/21 1019     Special Requests: --        NO SPECIAL REQUESTS  RIGHT  Antecubital       Culture result: NO GROWTH 5 DAYS       CULTURE, WOUND Delmi Sprout STAIN [[id number]]  (Abnormal)  (Susceptibility) Collected: 10/05/21 1738    Order Status: Completed Specimen: Wound from Foot Updated: 10/09/21 0852     Special Requests: LEFT FOOT        GRAM STAIN 0 TO 6 WBC'S/OIF      FEW GRAM NEGATIVE RODS        Culture result:       MODERATE KLEBSIELLA OXYTOCA            MODERATE ESCHERICHIA COLI               STAPHYLOCOCCUS AUREUS ISOLATED FROM BROTH ONLY                  ENTEROCOCCUS FAECALIS GROUP D ISOLATED FROM BROTH ONLY CULTURE, ANAEROBIC [[id number]] Collected: 10/06/21 1445    Order Status: Completed Specimen: Foot, left Updated: 10/08/21 0841     Special Requests: NO SPECIAL REQUESTS        Culture result:       SUBCULTURE IS NECESSARY TO DETERMINE PRESENCE OR ABSENCE OF ANAEROBIC BACTERIA IN THIS CULTURE. FURTHER REPORT TO FOLLOW AFTER INCUBATION OF SUBCULTURE. Kiley Madison STAIN [[id number]] Collected: 10/06/21 1500    Order Status: Canceled Specimen: Wound Drainage     GRAM STAIN [[id number]] Collected: 10/06/21 1500    Order Status: Canceled Specimen: Wound     GRAM STAIN [[id number]] Collected: 10/06/21 1500    Order Status: Canceled Specimen: Wound           EKG Results     Procedure 720 Value Units Date/Time    EKG, 12 LEAD, INITIAL [[id number]] Collected: 10/05/21 1658    Order Status: Completed Updated: 10/06/21 7377     Ventricular Rate 73 BPM      Atrial Rate 73 BPM      P-R Interval 138 ms      QRS Duration 84 ms      Q-T Interval 400 ms      QTC Calculation (Bezet) 440 ms      Calculated P Axis 74 degrees      Calculated R Axis 69 degrees      Calculated T Axis 70 degrees      Diagnosis --     Normal sinus rhythm  Normal ECG  No previous ECGs available  Confirmed by Lucero Christine MD (), Miguel Angel Soto ([id number]) on 10/6/2021 6:32:25 AM            Other Studies:  No results found.     Current Meds:   Current Facility-Administered Medications   Medication Dose Route Frequency    labetaloL (NORMODYNE;TRANDATE) injection 10 mg  10 mg IntraVENous Q6H PRN    diphenhydrAMINE (BENADRYL) injection 12.5 mg  12.5 mg IntraVENous Q15MIN PRN    flumazeniL (ROMAZICON) 0.1 mg/mL injection 0.2 mg  0.2 mg IntraVENous Multiple    HYDROmorphone (DILAUDID) injection 0.5 mg  0.5 mg IntraVENous Q15MIN PRN    lactated Ringers infusion  75 mL/hr IntraVENous CONTINUOUS    naloxone (NARCAN) injection 0.1 mg  0.1 mg IntraVENous PRN    promethazine (PHENERGAN) with saline injection 6.25 mg  6.25 mg IntraVENous Q15MIN PRN    albuterol-ipratropium (DUO-NEB) 2.5 MG-0.5 MG/3 ML  3 mL Nebulization Q4H RT    methylPREDNISolone (PF) (SOLU-MEDROL) injection 40 mg  40 mg IntraVENous Q8H    guaiFENesin ER (MUCINEX) tablet 1,200 mg  1,200 mg Oral Q12H    amLODIPine (NORVASC) tablet 10 mg  10 mg Oral DAILY    HYDROcodone-acetaminophen (NORCO) 5-325 mg per tablet 1 Tablet  1 Tablet Oral Q4H PRN    insulin glargine (LANTUS) injection 30 Units  30 Units SubCUTAneous DAILY    sodium chloride (NS) flush 5-10 mL  5-10 mL IntraVENous PRN    ezetimibe (ZETIA) tablet 10 mg  10 mg Oral DAILY    famotidine (PEPCID) tablet 20 mg  20 mg Oral BID    fluticasone propionate (FLONASE) 50 mcg/actuation nasal spray 2 Spray  2 Spray Both Nostrils DAILY    nitroglycerin (NITROSTAT) tablet 0.4 mg  0.4 mg SubLINGual PRN    [Held by provider] predniSONE (DELTASONE) tablet 5 mg  5 mg Oral BID WITH MEALS    sotaloL (BETAPACE) tablet 40 mg  40 mg Oral BID    sodium chloride (NS) flush 5-40 mL  5-40 mL IntraVENous Q8H    sodium chloride (NS) flush 5-40 mL  5-40 mL IntraVENous PRN    acetaminophen (TYLENOL) tablet 650 mg  650 mg Oral Q6H PRN    Or    acetaminophen (TYLENOL) suppository 650 mg  650 mg Rectal Q6H PRN    polyethylene glycol (MIRALAX) packet 17 g  17 g Oral DAILY PRN    ondansetron (ZOFRAN ODT) tablet 4 mg  4 mg Oral Q8H PRN    Or    ondansetron (ZOFRAN) injection 4 mg  4 mg IntraVENous Q6H PRN    traZODone (DESYREL) tablet 75 mg  75 mg Oral QHS    [Held by provider] losartan (COZAAR) tablet 100 mg  100 mg Oral DAILY    cefepime (MAXIPIME) 1 g in 0.9% sodium chloride (MBP/ADV) 50 mL MBP  1 g IntraVENous Q12H    dextrose 40% (GLUTOSE) oral gel 1 Tube  15 g Oral PRN    glucagon (GLUCAGEN) injection 1 mg  1 mg IntraMUSCular PRN    dextrose (D50W) injection syrg 12.5-25 g  25-50 mL IntraVENous PRN    insulin lispro (HUMALOG) injection   SubCUTAneous AC&HS       Problem List:  Hospital Problems as of 10/12/2021 Date Reviewed: 10/5/2021        Codes Class Noted - Resolved POA    MICHELLE (acute kidney injury) ([place] [id number].) ICD-10-CM: N17.9  ICD-9-CM: 584.9  10/11/2021 - Present Unknown        COPD with acute exacerbation ([place] [address].) ICD-10-CM: J44.1  ICD-9-CM: 491.21  10/11/2021 - Present Unknown        * (Principal) Abscess of left foot ICD-10-CM: H83.580  ICD-9-CM: 682.7  10/5/2021 - Present Unknown        DM type 2 (diabetes mellitus, type 2) ([place] [address].) ICD-10-CM: E11.9  ICD-9-CM: 250.00  10/5/2021 - Present Unknown        HTN (hypertension) ICD-10-CM: I10  ICD-9-CM: 401.9  10/5/2021 - Present Unknown        Sleep apnea ICD-10-CM: G47.30  ICD-9-CM: 780.57  10/5/2021 - Present Unknown        COPD (chronic obstructive pulmonary disease) ([place] [address].) ICD-10-CM: J44.9  ICD-9-CM: 391  10/5/2021 - Present Unknown        Polymyalgia rheumatica ([facility] ICD-10-CM: M35.3  ICD-9-CM: 742  10/5/2021 - Present Unknown        Hyperlipidemia ICD-10-CM: E78.5  ICD-9-CM: 272.4  10/5/2021 - Present Unknown        Diabetic neuropathy associated with type 2 diabetes mellitus ([place] [address].) ICD-10-CM: E11.40  ICD-9-CM: 250.60, 357.2  10/5/2021 - Present Unknown        Class 1 obesity in adult ICD-10-CM: E66.9  ICD-9-CM: 278.00  10/5/2021 - Present Unknown        CAD (coronary artery disease) ICD-10-CM: I25.10  ICD-9-CM: 414.00  10/5/2021 - Present Unknown        H/O cardiac pacemaker ICD-10-CM: Z95.0  ICD-9-CM: V12.50, V45.01  10/5/2021 - Present Unknown                 Signed By: Regina Bueno MD   [employer] [facility]    October 12, 2021  3:31 PM

## 2021-10-12 NOTE — PROGRESS NOTES
Heparin restarted at 12 at 2047,  New bag was administered. Xa was drawn right before administration. Called pharmacy and spoke with Gagan Garcia, asked if IV bolus was needed to start the heparin infusion again, was informed that a bolus IV was not needed, Just to start heparin at 12. Xa scheduled for 0300.

## 2021-10-12 NOTE — PROGRESS NOTES
10/11/21 5616   CPAP/BIPAP   CPAP/BIPAP Start/Stop On   Device Mode CPAP   Mask Type and Size Nasal mask   Skin Condition intact   Pt's Home Machine Yes (type/vendor)

## 2021-10-12 NOTE — PROGRESS NOTES
ACUTE PHYSICAL THERAPY GOALS:  (Developed with and agreed upon by patient and/or caregiver. )  LTG:  (1.)Mr. Sharan Collet will move from supine to sit and sit to supine , scoot up and down and roll side to side in bed with INDEPENDENT within 7 treatment day(s). (2.)Mr. Sharan Collet will transfer from bed to chair and chair to bed with MODIFIED INDEPENDENCE using the least restrictive device within 7 treatment day(s). (3.)Mr. Sharan Collet will ambulate with MODIFIED INDEPENDENCE for 15+ feet with the least restrictive device within 7 treatment day(s). (4.)Mr. Sharan Collet will perform 2 stairs with HR and CGA within 7 treatment days for ascending and descending stairs for home.   ________________________________________________________________________________________________       PHYSICAL THERAPY ASSESSMENT: Initial Assessment and PM PT Treatment Day # 1     EMILY Estebandy Sacks is a 77 y.o. male   PRIMARY DIAGNOSIS: Abscess of left foot  Abscess of left foot [L02.612]  Procedure(s) (LRB):  LEFT FOOT AMPUTATION ROOM S914 (Left)  AMPUTATION TOE (Left)  1 Day Post-Op  Reason for Referral:    ICD-10: Treatment Diagnosis: Difficulty in walking, Not elsewhere classified (R26.2)  [place]PAT[place]: [place] / [place] B / Product Type: Medicare /     ASSESSMENT:     REHAB RECOMMENDATIONS:   Recommendation to date pending progress:  Setting:   No further skilled therapy   Equipment:    None     PRIOR LEVEL OF FUNCTION:  (Prior to Hospitalization) INITIAL/CURRENT LEVEL OF FUNCTION:  (Most Recently Demonstrated)   Bed Mobility:   Independent  Sit to Stand:   Independent  Transfers:   Independent  Gait/Mobility:   Modified Independent Bed Mobility:   Independent  Sit to Stand:   Modified Independent  Transfers:  [facility] [employer] Assistance  Gait/Mobility:   Standby Assistance     ASSESSMENT:  Mr. Sharan Collet presents s/p L midfoot amputation on 10/11/21, NWB L LE.  Pt independent with ADLs, driving, ambulation until 2 weeks ago. Pt lives with spouse who is retired RN and can assist pt as needed. Pt expresses has all DME needed at home, has been doing various WBS for L LE for over 5 years due to chronic wound. Pt today independent with bed mobility, mod I with transfer to standing using bed for support. Pt has his own way of standing from EOB, but is able to maintain NWB through transfer. Pt then ambulation by hopping 5' bed to chair in room using RW for support, maintaining NWB throughout. PT to follow for 1-2 more visits to continue to assess transfers, ambulation and safety with mobility. Anticipate no needs at discharge. SUBJECTIVE:   Mr. Nandini Mejia states, \"I have been doing this for years. \"    SOCIAL HISTORY/LIVING ENVIRONMENT: lives spouse, independent to mod I at baseline, no falls  Home Environment: Private residence  # Steps to Enter: 1  One/Two Story Residence: One story  Living Alone: No  Support Systems: Spouse/Significant Other  OBJECTIVE:     PAIN: VITAL SIGNS: LINES/DRAINS:   Pre Treatment: Pain Screen  Pain Scale 1: Numeric (0 - 10)  Pain Intensity 1: 0  Post Treatment: 0 Vital Signs  O2 Device: None (Room air) none  O2 Device: None (Room air)     GROSS EVALUATION:  B LE Within Functional Limits Abnormal/ Functional Abnormal/ Non-Functional (see comments) Not Tested Comments:   AROM [x] [] [] [] NT L ankle/foot   PROM [x] [] [] [] NT L ankle/foot   Strength [x] [] [] [] Grossly 5/5   Balance [] [x] [] []    Posture [x] [] [] []    Sensation [] [x] [] [] Neuropathy R foot, phantom pain L   Coordination [x] [] [] []    Tone [x] [] [] []    Edema [x] [] [] []    Activity Tolerance [] [x] [] []     [] [] [] []      COGNITION/  PERCEPTION: Intact Impaired   (see comments) Comments:   Orientation [x] []    Vision [x] []    Hearing [x] []    Command Following [x] []    Safety Awareness [x] []     [] []      MOBILITY: I Mod I S SBA CGA Min Mod Max Total  NT x2 Comments:   Bed Mobility    Rolling [x] [] [] [] [] [] [] [] [] [] []    Supine to Sit [] [x] [] [] [] [] [] [] [] [] []    Scooting [x] [] [] [] [] [] [] [] [] [] []    Sit to Supine [] [] [] [] [] [] [] [] [] [x] [] chair   Transfers    Sit to Stand [] [] [] [x] [x] [] [] [] [] [] []    Bed to Chair [] [] [] [] [x] [] [] [] [] [] []    Stand to Sit [] [] [] [x] [] [] [] [] [] [] []    I=Independent, Mod I=Modified Independent, S=Supervision, SBA=Standby Assistance, CGA=Contact Guard Assistance,   Min=Minimal Assistance, Mod=Moderate Assistance, Max=Maximal Assistance, Total=Total Assistance, NT=Not Tested  GAIT: I Mod I S SBA CGA Min Mod Max Total  NT x2 Comments:   Level of Assistance [] [] [] [] [x] [] [] [] [] [] []    Distance 5    DME Rolling Walker    Gait Quality Hopping    Weightbearing Status NWB     I=Independent, Mod I=Modified Independent, S=Supervision, SBA=Standby Assistance, CGA=Contact Guard Assistance,   Min=Minimal Assistance, Mod=Moderate Assistance, Max=Maximal Assistance, Total=Total Assistance, NT=Not Tested    [address] [facility] [address] [address] [facility]       How much difficulty does the patient currently have. .. Unable A Lot A Little None   1. Turning over in bed (including adjusting bedclothes, sheets and blankets)? [] 1   [] 2   [] 3   [x] 4   2. Sitting down on and standing up from a chair with arms ( e.g., wheelchair, bedside commode, etc.)   [] 1   [] 2   [] 3   [x] 4   3. Moving from lying on back to sitting on the side of the bed? [] 1   [] 2   [] 3   [x] 4   How much help from another person does the patient currently need. .. Total A Lot A Little None   4. Moving to and from a bed to a chair (including a wheelchair)? [] 1   [] 2   [x] 3   [] 4   5. Need to walk in hospital room? [] 1   [] 2   [x] 3   [] 4   6. Climbing 3-5 steps with a railing? [] 1   [x] 2   [] 3   [] 4   © 2007, Trustees of [address] [place] [address] [address] [id number], under license to [facility].  All rights reserved     Score:  Initial: 20 Most Recent: X (Date: -- )    Interpretation of Tool:  Represents activities that are increasingly more difficult (i.e. Bed mobility, Transfers, Gait). PLAN:   FREQUENCY/DURATION: PT Plan of Care: 3 times/week for duration of hospital stay or until stated goals are met, whichever comes first.    PROBLEM LIST:   (Skilled intervention is medically necessary to address:)  1. Decreased ADL/Functional Activities  2. Decreased Activity Tolerance  3. Decreased Balance  4. Decreased Gait Ability  5. Decreased Transfer Abilities   INTERVENTIONS PLANNED:   (Benefits and precautions of physical therapy have been discussed with the patient.)  1. Therapeutic Activity  2. Therapeutic Exercise/HEP  3. Neuromuscular Re-education  4. Gait Training  5. Education     TREATMENT:     EVALUATION: Moderate Complexity : (Untimed Charge)    TREATMENT:   ($$ Therapeutic Activity: 8-22 mins    )  Therapeutic Activity (10 Minutes): Therapeutic activity included Supine to Sit, Scooting, Transfer Training, Ambulation on level ground, Sitting balance , Standing balance and walker safety, WBS to improve functional Mobility, Strength, Activity tolerance and balance.     TREATMENT GRID:  N/A    AFTER TREATMENT POSITION/PRECAUTIONS:  Chair, Needs within reach and RN notified    INTERDISCIPLINARY COLLABORATION:  RN/PCT and PT/PTA    TOTAL TREATMENT DURATION:  PT Patient Time In/Time Out  Time In: 1413  Time Out: 67 [facility], PT

## 2021-10-12 NOTE — PROGRESS NOTES
ORTH FRACTURE PROGRESS NOTE    2021  Admit Date:   10/5/2021    Post Op day: 1 Day Post-Op    Subjective:    Merlinda Goring     PT/OT:   Gait:                    Vital Signs:    Patient Vitals for the past 8 hrs:   BP Temp Pulse Resp SpO2   10/12/21 1213 (!) 152/70 97.4 °F (36.3 °C) 69 17 94 %   10/12/21 0829 -- -- -- -- 98 %   10/12/21 0755 (!) 162/57 97.8 °F (36.6 °C) 69 16 92 %     Temp (24hrs), Av.9 °F (36.6 °C), Min:97.4 °F (36.3 °C), Max:98.4 °F (36.9 °C)      Pain Control:   Pain Assessment  Pain Scale 1: Numeric (0 - 10)  Pain Intensity 1: 0  Pain Onset 1: ongoing  Pain Location 1: Foot  Pain Orientation 1: Left  Pain Description 1: Sharp  Pain Intervention(s) 1: Medication (see MAR)    Meds:    Current Facility-Administered Medications   Medication Dose Route Frequency    [START ON 10/13/2021] methylPREDNISolone (PF) (SOLU-MEDROL) injection 40 mg  40 mg IntraVENous Q24H    rivaroxaban (XARELTO) tablet 20 mg  20 mg Oral DAILY WITH BREAKFAST    labetaloL (NORMODYNE;TRANDATE) injection 10 mg  10 mg IntraVENous Q6H PRN    diphenhydrAMINE (BENADRYL) injection 12.5 mg  12.5 mg IntraVENous Q15MIN PRN    flumazeniL (ROMAZICON) 0.1 mg/mL injection 0.2 mg  0.2 mg IntraVENous Multiple    HYDROmorphone (DILAUDID) injection 0.5 mg  0.5 mg IntraVENous Q15MIN PRN    lactated Ringers infusion  75 mL/hr IntraVENous CONTINUOUS    naloxone (NARCAN) injection 0.1 mg  0.1 mg IntraVENous PRN    promethazine (PHENERGAN) with saline injection 6.25 mg  6.25 mg IntraVENous Q15MIN PRN    albuterol-ipratropium (DUO-NEB) 2.5 MG-0.5 MG/3 ML  3 mL Nebulization Q4H RT    guaiFENesin ER (MUCINEX) tablet 1,200 mg  1,200 mg Oral Q12H    amLODIPine (NORVASC) tablet 10 mg  10 mg Oral DAILY    HYDROcodone-acetaminophen (NORCO) 5-325 mg per tablet 1 Tablet  1 Tablet Oral Q4H PRN    insulin glargine (LANTUS) injection 30 Units  30 Units SubCUTAneous DAILY    sodium chloride (NS) flush 5-10 mL  5-10 mL IntraVENous PRN    ezetimibe (ZETIA) tablet 10 mg  10 mg Oral DAILY    famotidine (PEPCID) tablet 20 mg  20 mg Oral BID    fluticasone propionate (FLONASE) 50 mcg/actuation nasal spray 2 Spray  2 Spray Both Nostrils DAILY    nitroglycerin (NITROSTAT) tablet 0.4 mg  0.4 mg SubLINGual PRN    [Held by provider] predniSONE (DELTASONE) tablet 5 mg  5 mg Oral BID WITH MEALS    sotaloL (BETAPACE) tablet 40 mg  40 mg Oral BID    sodium chloride (NS) flush 5-40 mL  5-40 mL IntraVENous Q8H    sodium chloride (NS) flush 5-40 mL  5-40 mL IntraVENous PRN    acetaminophen (TYLENOL) tablet 650 mg  650 mg Oral Q6H PRN    Or    acetaminophen (TYLENOL) suppository 650 mg  650 mg Rectal Q6H PRN    polyethylene glycol (MIRALAX) packet 17 g  17 g Oral DAILY PRN    ondansetron (ZOFRAN ODT) tablet 4 mg  4 mg Oral Q8H PRN    Or    ondansetron (ZOFRAN) injection 4 mg  4 mg IntraVENous Q6H PRN    traZODone (DESYREL) tablet 75 mg  75 mg Oral QHS    [Held by provider] losartan (COZAAR) tablet 100 mg  100 mg Oral DAILY    cefepime (MAXIPIME) 1 g in 0.9% sodium chloride (MBP/ADV) 50 mL MBP  1 g IntraVENous Q12H    dextrose 40% (GLUTOSE) oral gel 1 Tube  15 g Oral PRN    glucagon (GLUCAGEN) injection 1 mg  1 mg IntraMUSCular PRN    dextrose (D50W) injection syrg 12.5-25 g  25-50 mL IntraVENous PRN    insulin lispro (HUMALOG) injection   SubCUTAneous AC&HS       LAB:    Recent Labs     10/11/21  0341   HCT 37.1*   HGB 12.4*       24 Hour Assessment Issues:    Oriented    Discharge Planning: SNF    Transfuse PRBC's:      Assessment & Physician's Comment:  Dressing is clean, dry, and intact    Principal Problem:    Abscess of left foot (10/5/2021)    Active Problems:    DM type 2 (diabetes mellitus, type 2) ([facility] (10/5/2021)      HTN (hypertension) (10/5/2021)      Sleep apnea (10/5/2021)      COPD (chronic obstructive pulmonary disease) ([facility] (10/5/2021)      Polymyalgia rheumatica ([facility] (10/5/2021)      Hyperlipidemia (10/5/2021)      Diabetic neuropathy associated with type 2 diabetes mellitus ([facility] (10/5/2021)      Class 1 obesity in adult (10/5/2021)      CAD (coronary artery disease) (10/5/2021)      H/O cardiac pacemaker (10/5/2021)      MICHELLE (acute kidney injury) ([facility] (10/11/2021)      COPD with acute exacerbation ([facility] (10/11/2021)        Plan:  Patient is stable from orthopedic standpoint. He is able to be discharged from orthopedic standpoint. Continue nonweightbearing left lower extremity.     Carlos Manuel Harris MD

## 2021-10-13 VITALS
HEIGHT: 75 IN | RESPIRATION RATE: 18 BRPM | OXYGEN SATURATION: 96 % | DIASTOLIC BLOOD PRESSURE: 65 MMHG | HEART RATE: 70 BPM | WEIGHT: 272 LBS | BODY MASS INDEX: 33.82 KG/M2 | TEMPERATURE: 98.8 F | SYSTOLIC BLOOD PRESSURE: 150 MMHG

## 2021-10-13 PROBLEM — M86.9 OSTEOMYELITIS OF LEFT FOOT (HCC): Status: ACTIVE | Noted: 2021-10-13

## 2021-10-13 PROBLEM — Z89.432 STATUS POST AMPUTATION OF LEFT FOOT THROUGH METATARSAL BONE (HCC): Status: ACTIVE | Noted: 2021-10-13

## 2021-10-13 LAB
ANION GAP SERPL CALC-SCNC: 7 MMOL/L (ref 7–16)
BUN SERPL-MCNC: 20 MG/DL (ref 8–23)
CALCIUM SERPL-MCNC: 9 MG/DL (ref 8.3–10.4)
CHLORIDE SERPL-SCNC: 106 MMOL/L (ref 98–107)
CO2 SERPL-SCNC: 26 MMOL/L (ref 21–32)
CREAT SERPL-MCNC: 1.23 MG/DL (ref 0.8–1.5)
GLUCOSE BLD STRIP.AUTO-MCNC: 112 MG/DL (ref 65–100)
GLUCOSE BLD STRIP.AUTO-MCNC: 251 MG/DL (ref 65–100)
GLUCOSE SERPL-MCNC: 89 MG/DL (ref 65–100)
POTASSIUM SERPL-SCNC: 3.8 MMOL/L (ref 3.5–5.1)
SERVICE CMNT-IMP: ABNORMAL
SERVICE CMNT-IMP: ABNORMAL
SODIUM SERPL-SCNC: 139 MMOL/L (ref 138–145)

## 2021-10-13 PROCEDURE — 94760 N-INVAS EAR/PLS OXIMETRY 1: CPT

## 2021-10-13 PROCEDURE — 94640 AIRWAY INHALATION TREATMENT: CPT

## 2021-10-13 PROCEDURE — 36415 COLL VENOUS BLD VENIPUNCTURE: CPT

## 2021-10-13 PROCEDURE — 80048 BASIC METABOLIC PNL TOTAL CA: CPT

## 2021-10-13 PROCEDURE — 74011636637 HC RX REV CODE- 636/637: Performed by: NURSE PRACTITIONER

## 2021-10-13 PROCEDURE — 74011250637 HC RX REV CODE- 250/637: Performed by: FAMILY MEDICINE

## 2021-10-13 PROCEDURE — 74011000250 HC RX REV CODE- 250: Performed by: FAMILY MEDICINE

## 2021-10-13 PROCEDURE — 82962 GLUCOSE BLOOD TEST: CPT

## 2021-10-13 PROCEDURE — 74011636637 HC RX REV CODE- 636/637: Performed by: FAMILY MEDICINE

## 2021-10-13 PROCEDURE — 74011250637 HC RX REV CODE- 250/637: Performed by: NURSE PRACTITIONER

## 2021-10-13 PROCEDURE — 97165 OT EVAL LOW COMPLEX 30 MIN: CPT

## 2021-10-13 PROCEDURE — 74011250636 HC RX REV CODE- 250/636: Performed by: FAMILY MEDICINE

## 2021-10-13 PROCEDURE — 74011000258 HC RX REV CODE- 258: Performed by: FAMILY MEDICINE

## 2021-10-13 PROCEDURE — 97530 THERAPEUTIC ACTIVITIES: CPT

## 2021-10-13 RX ORDER — CEFPODOXIME PROXETIL 200 MG/1
200 TABLET, FILM COATED ORAL 2 TIMES DAILY
Qty: 8 TABLET | Refills: 0 | Status: SHIPPED | OUTPATIENT
Start: 2021-10-13 | End: 2021-10-13

## 2021-10-13 RX ORDER — CEPHALEXIN 500 MG/1
500 CAPSULE ORAL EVERY 6 HOURS
Status: DISCONTINUED | OUTPATIENT
Start: 2021-10-13 | End: 2021-10-13 | Stop reason: HOSPADM

## 2021-10-13 RX ORDER — NALOXONE HYDROCHLORIDE 4 MG/.1ML
SPRAY NASAL
Qty: 1 EACH | Refills: 0 | Status: SHIPPED | OUTPATIENT
Start: 2021-10-13 | End: 2021-10-13

## 2021-10-13 RX ORDER — AMLODIPINE BESYLATE 10 MG/1
10 TABLET ORAL DAILY
Qty: 30 TABLET | Refills: 0 | Status: SHIPPED | OUTPATIENT
Start: 2021-10-14 | End: 2021-10-13

## 2021-10-13 RX ORDER — HYDROCODONE BITARTRATE AND ACETAMINOPHEN 5; 325 MG/1; MG/1
1 TABLET ORAL
Qty: 30 TABLET | Refills: 0 | Status: SHIPPED | OUTPATIENT
Start: 2021-10-13 | End: 2021-10-18

## 2021-10-13 RX ORDER — ALBUTEROL SULFATE 90 UG/1
1 AEROSOL, METERED RESPIRATORY (INHALATION)
Qty: 18 G | Refills: 0 | Status: SHIPPED | OUTPATIENT
Start: 2021-10-13

## 2021-10-13 RX ORDER — INSULIN DEGLUDEC INJECTION 200 U/ML
50 INJECTION, SOLUTION SUBCUTANEOUS DAILY
Qty: 1 PEN | Refills: 0 | Status: SHIPPED
Start: 2021-10-13

## 2021-10-13 RX ORDER — NALOXONE HYDROCHLORIDE 4 MG/.1ML
SPRAY NASAL
Qty: 1 EACH | Refills: 0 | Status: SHIPPED | OUTPATIENT
Start: 2021-10-13

## 2021-10-13 RX ORDER — CIPROFLOXACIN 250 MG/1
500 TABLET, FILM COATED ORAL EVERY 12 HOURS
Status: DISCONTINUED | OUTPATIENT
Start: 2021-10-13 | End: 2021-10-13 | Stop reason: HOSPADM

## 2021-10-13 RX ORDER — GUAIFENESIN 600 MG/1
600 TABLET, EXTENDED RELEASE ORAL 2 TIMES DAILY
Qty: 14 TABLET | Refills: 0 | Status: SHIPPED | OUTPATIENT
Start: 2021-10-13

## 2021-10-13 RX ORDER — CEFPODOXIME PROXETIL 200 MG/1
200 TABLET, FILM COATED ORAL 2 TIMES DAILY
Qty: 8 TABLET | Refills: 0 | Status: SHIPPED | OUTPATIENT
Start: 2021-10-13

## 2021-10-13 RX ADMIN — FAMOTIDINE 20 MG: 20 TABLET ORAL at 08:31

## 2021-10-13 RX ADMIN — INSULIN LISPRO 9 UNITS: 100 INJECTION, SOLUTION INTRAVENOUS; SUBCUTANEOUS at 11:49

## 2021-10-13 RX ADMIN — LOSARTAN POTASSIUM 100 MG: 50 TABLET, FILM COATED ORAL at 08:31

## 2021-10-13 RX ADMIN — GUAIFENESIN 1200 MG: 600 TABLET ORAL at 08:31

## 2021-10-13 RX ADMIN — IPRATROPIUM BROMIDE AND ALBUTEROL SULFATE 3 ML: .5; 3 SOLUTION RESPIRATORY (INHALATION) at 09:59

## 2021-10-13 RX ADMIN — CIPROFLOXACIN 500 MG: 250 TABLET ORAL at 08:58

## 2021-10-13 RX ADMIN — CEPHALEXIN 500 MG: 500 CAPSULE ORAL at 11:39

## 2021-10-13 RX ADMIN — METHYLPREDNISOLONE SODIUM SUCCINATE 40 MG: 40 INJECTION, POWDER, FOR SOLUTION INTRAMUSCULAR; INTRAVENOUS at 05:15

## 2021-10-13 RX ADMIN — EZETIMIBE 10 MG: 10 TABLET ORAL at 08:31

## 2021-10-13 RX ADMIN — IPRATROPIUM BROMIDE AND ALBUTEROL SULFATE 3 ML: .5; 3 SOLUTION RESPIRATORY (INHALATION) at 04:00

## 2021-10-13 RX ADMIN — CEFEPIME HYDROCHLORIDE 1 G: 1 INJECTION, POWDER, FOR SOLUTION INTRAMUSCULAR; INTRAVENOUS at 05:16

## 2021-10-13 RX ADMIN — SOTALOL HYDROCHLORIDE 40 MG: 80 TABLET ORAL at 08:31

## 2021-10-13 RX ADMIN — RIVAROXABAN 20 MG: 20 TABLET, FILM COATED ORAL at 08:31

## 2021-10-13 RX ADMIN — Medication 10 ML: at 05:15

## 2021-10-13 RX ADMIN — INSULIN GLARGINE 30 UNITS: 100 INJECTION, SOLUTION SUBCUTANEOUS at 09:00

## 2021-10-13 RX ADMIN — FLUTICASONE PROPIONATE 2 SPRAY: 50 SPRAY, METERED NASAL at 09:00

## 2021-10-13 RX ADMIN — AMLODIPINE BESYLATE 10 MG: 10 TABLET ORAL at 08:31

## 2021-10-13 RX ADMIN — HYDROCODONE BITARTRATE AND ACETAMINOPHEN 1 TABLET: 5; 325 TABLET ORAL at 05:16

## 2021-10-13 NOTE — DISCHARGE INSTRUCTIONS
Patient Education   Foot Amputation: What to Expect at [facility] amputation is surgery to remove part or all of your foot. Your doctor left as much healthy bone, skin, blood vessel, and nerve tissue as possible. After a foot amputation, you will probably have bandages, a rigid dressing, or a cast over the remaining part of your leg or foot. The leg or foot may be swollen for 4 weeks or longer after your surgery. If you have a rigid dressing or cast, your doctor will set up regular visits to change the dressing or cast and check the healing. If you have elastic bandages, your doctor will tell you how to change them. You may have pain in the remaining part of your foot. You also may think you have feeling or pain where your foot was. This is called phantom pain. It is common and may come and go for a year or longer. Your doctor can give you medicine for both types of pain. You may have been fitted with a temporary artificial foot while you were still in the hospital. If this is the case, your doctor will teach you how to care for it. If you are getting an artificial foot or prosthesis, you may need to get used to it before you go back to work and your other activities. You will probably not wear it all the time, so you may need to learn how to use a wheelchair, crutches, or other device. You may have to make changes in your home. Your workplace may be able to make allowances for you. Having part or all of your foot removed can be traumatic. And learning to live with new limits can be hard and frustrating. Many people feel depressed and may grieve for their former lifestyle. It's important to understand these feelings. Talking with your family, friends, and health professionals about your frustrations is an important part of your recovery. You may also find that it helps to talk with a person who has had an amputation.   Remember that even though you've lost a foot, it doesn't change who you are or prevent you from enjoying life. You'll have to adapt and learn new ways to do things. But you can still work and take part in sports and activities. And you can still learn, love, play, and live life to its fullest.  Many organizations can help you adjust to your new life. For example, you can find information at amputee-coalition.org. This care sheet gives you a general idea about how long it will take for you to recover. But each person recovers at a different pace. Follow the steps below to get better as quickly as possible. How can you care for yourself at home? Activity    · Be active. Talk to your doctor about what you can do. If you are active and use your remaining limb or foot, it will heal faster.     · You may shower when your doctor okays it. Wash the remaining limb or foot with mild soap and water, and pat it dry. You may need help doing this at first.     · You may be able to drive when you finish your rehab and have an artificial foot or prosthesis. You may need to adapt your car to your situation.     · You will probably be able to return to work and your usual routine when your remaining limb or foot heals. This may be as soon as 4 to 8 weeks after surgery. Diet    · You can eat your normal diet. If your stomach is upset, try bland, low-fat foods like plain rice, broiled chicken, toast, and yogurt.     · You may notice that your bowel movements are not regular right after your surgery. This is common. Try to avoid constipation and straining with bowel movements. Take a fiber supplement every day. If you have not had a bowel movement after a couple of days, ask your doctor about taking a mild laxative. Medicines    · Your doctor will tell you if and when you can restart your medicines. He or she will also give you instructions about taking any new medicines.     · If you take aspirin or some other blood thinner, ask your doctor if and when to start taking it again.  Make sure that you understand exactly what your doctor wants you to do.     · Be safe with medicines. Take pain medicines exactly as directed. ? If the doctor gave you a prescription medicine for pain, take it as prescribed. ? If you are not taking a prescription pain medicine, ask your doctor if you can take an over-the-counter medicine.     · If you think your pain medicine is making you sick to your stomach:  ? Take your medicine after meals (unless your doctor has told you not to). ? Ask your doctor for a different pain medicine.     · If your doctor prescribed antibiotics, take them as directed. Do not stop taking them just because you feel better. You need to take the full course of antibiotics. Remaining limb or foot care    · You may have bandages, a rigid dressing, or a cast on your remaining limb or foot. Your doctor will tell you how to take care of it. Depending on your dressing and the doctor's instructions:  ? Check your remaining limb or foot daily for irritation, skin breaks, and redness. Tell your doctor about any problems you see. ? Wash your remaining limb or foot with mild soap and warm water every night. Pat it dry.     · If you have a temporary artificial foot, remove it before you go to sleep. Follow-up care is a key part of your treatment and safety. Be sure to make and go to all appointments, and call your doctor if you are having problems. It's also a good idea to know your test results and keep a list of the medicines you take. When should you call for help? Call 911 anytime you think you may need emergency care. For example, call if:    · You passed out (lost consciousness).     · You have sudden chest pain, are short of breath, or you cough up blood.    Call your doctor now or seek immediate medical care if:    · You have pain that does not get better after you take pain medicine.     · You are sick to your stomach or cannot drink fluids.     · You have loose stitches, or your incision comes open.     · You have signs of a blood clot in your leg (called a deep vein thrombosis), such as:  ? Pain in your calf, back of the knee, thigh, or groin. ? Redness or swelling in your leg.     · You have signs of infection, such as:  ? Increased pain, swelling, warmth, or redness. ? Red streaks leading from the incision. ? Pus draining from the incision. ? A fever.     · You bleed through the bandage. Watch closely for any changes in your health, and be sure to contact your doctor if you have any problems. Where can you learn more? Go to http://www.gray.com/  Enter B873 in the search box to learn more about \"Foot Amputation: What to Expect at Home. \"  Current as of: July 1, 2021               Content Version: 13.0  © 2006-2021 [email or web address]. Care instructions adapted under license by [employer] (which disclaims liability or warranty for this information). If you have questions about a medical condition or this instruction, always ask your healthcare professional. [place] [id number] any warranty or liability for your use of this information. Patient Education        Learning About Carbohydrate (Carb) Counting and Eating Out When You Have Diabetes  Why plan your meals? Meal planning can be a key part of managing diabetes. Planning meals and snacks with the right balance of carbohydrate, protein, and fat can help you keep your blood sugar at the target level you set with your doctor. You don't have to eat special foods. You can eat what your family eats, including sweets once in a while. But you do have to pay attention to how often you eat and how much you eat of certain foods. You may want to work with a dietitian or a certified diabetes educator. He or she can give you tips and meal ideas and can answer your questions about meal planning.  This health professional can also help you reach a healthy weight if that is one of your goals.  What should you know about eating carbs? Managing the amount of carbohydrate (carbs) you eat is an important part of healthy meals when you have diabetes. Carbohydrate is found in many foods. · Learn which foods have carbs. And learn the amounts of carbs in different foods. ? Bread, cereal, pasta, and rice have about 15 grams of carbs in a serving. A serving is 1 slice of bread (1 ounce), ½ cup of cooked cereal, or 1/3 cup of cooked pasta or rice. ? Fruits have 15 grams of carbs in a serving. A serving is 1 small fresh fruit, such as an apple or orange; ½ of a banana; ½ cup of cooked or canned fruit; ½ cup of fruit juice; 1 cup of melon or raspberries; or 2 tablespoons of dried fruit. ? Milk and no-sugar-added yogurt have 15 grams of carbs in a serving. A serving is 1 cup of milk or 2/3 cup of no-sugar-added yogurt. ? Starchy vegetables have 15 grams of carbs in a serving. A serving is ½ cup of mashed potatoes or sweet potato; 1 cup winter squash; ½ of a small baked potato; ½ cup of cooked beans; or ½ cup cooked corn or green peas. · Learn how much carbs to eat each day and at each meal. A dietitian or CDE can teach you how to keep track of the amount of carbs you eat. This is called carbohydrate counting. · If you are not sure how to count carbohydrate grams, use the Plate Method to plan meals. It is a good, quick way to make sure that you have a balanced meal. It also helps you spread carbs throughout the day. ? Divide your plate by types of foods. Put non-starchy vegetables on half the plate, meat or other protein food on one-quarter of the plate, and a grain or starchy vegetable in the final quarter of the plate.  To this you can add a small piece of fruit and 1 cup of milk or yogurt, depending on how many carbs you are supposed to eat at a meal.  · Try to eat about the same amount of carbs at each meal. Do not \"save up\" your daily allowance of carbs to eat at one meal.  · Proteins have very little or no carbs per serving. Examples of proteins are beef, chicken, turkey, fish, eggs, tofu, cheese, cottage cheese, and peanut butter. A serving size of meat is 3 ounces, which is about the size of a deck of cards. Examples of meat substitute serving sizes (equal to 1 ounce of meat) are 1/4 cup of cottage cheese, 1 egg, 1 tablespoon of peanut butter, and ½ cup of tofu. How can you eat out and still eat healthy? · Learn to estimate the serving sizes of foods that have carbohydrate. If you measure food at home, it will be easier to estimate the amount in a serving of restaurant food. · If the meal you order has too much carbohydrate (such as potatoes, corn, or baked beans), ask to have a low-carbohydrate food instead. Ask for a salad or green vegetables. · If you use insulin, check your blood sugar before and after eating out to help you plan how much to eat in the future. · If you eat more carbohydrate at a meal than you had planned, take a walk or do other exercise. This will help lower your blood sugar. What are some tips for eating healthy? · Limit saturated fat, such as the fat from meat and dairy products. This is a healthy choice because people who have diabetes are at higher risk of heart disease. So choose lean cuts of meat and nonfat or low-fat dairy products. Use olive or canola oil instead of butter or shortening when cooking. · Don't skip meals. Your blood sugar may drop too low if you skip meals and take insulin or certain medicines for diabetes. · Check with your doctor before you drink alcohol. Alcohol can cause your blood sugar to drop too low. Alcohol can also cause a bad reaction if you take certain diabetes medicines. Follow-up care is a key part of your treatment and safety. Be sure to make and go to all appointments, and call your doctor if you are having problems. It's also a good idea to know your test results and keep a list of the medicines you take.   Where can you learn more?  Go to http://www.gray.com/  Enter I147 in the search box to learn more about \"Learning About Carbohydrate (Carb) Counting and Eating Out When You Have Diabetes. \"  Current as of: December 17, 2020               Content Version: 13.0  © 9873-9483 [email or web address]. Care instructions adapted under license by [employer] (which disclaims liability or warranty for this information). If you have questions about a medical condition or this instruction, always ask your healthcare professional. [place] [address] any warranty or liability for your use of this information. Patient Education   Acetaminophen/Guaifenesin/Phenylephrine (By mouth)   Acetaminophen ([id number]-MIN-[id number]-fen), Guaifenesin ([id number]), Phenylephrine ([id number]-io-SO-rin)  Treats cold, flu, and allergy symptoms such as stuffy nose, fever, and mild aches, relieves sinus headaches, and helps loosen mucus. Brand Name(s): CCP, Good Sense Sinus Congestion & Pain Severe, Mucinex Fast-Max Cold & Sinus, Mucinex Sinus-Max Severe Congestion Relief, [employer] Severe Sinus Congestion & Pain Relief, Sudafed PE Pressure+Pain+Mucus, Sudafed PE Triple Action, Theraflu Warming Relief Cold & Chest Congestion, Tylenol Cold Head Congestion Severe, Tylenol Sinus Congestion & Pain Severe, Tylenol Sinus Severe Day   There may be other brand names for this medicine. When This Medicine Should Not Be Used: This medicine is not right for everyone. Do not use it if you had an allergic reaction to acetaminophen, guaifenesin, phenylephrine, or any other pain, cough, or cold medicines. Do not give any over-the-counter (OTC) cough and cold medicine to a child younger than 4 years. How to Use This Medicine:   Liquid, Long Acting Tablet, Tablet  · Your doctor will tell you how much medicine to use. Do not use more than directed. · It is best to take this medicine with food or milk. · Swallow the tablet whole. Do not crush, break, or chew it. · Missed dose: Take a dose as soon as you remember. If it is almost time for your next dose, wait until then and take a regular dose. Do not take extra medicine to make up for a missed dose. · Store the medicine in a closed container at room temperature, away from heat, moisture, and direct light. Drugs and Foods to Avoid:   Ask your doctor or pharmacist before using any other medicine, including over-the-counter medicines, vitamins, and herbal products. · Do not take this medicine if you have taken an MAO inhibitor (MAOI) within the past 14 days. · Some medicines and foods can affect how this medicine works. Tell your doctor if you are taking any of the following:   ¨ Digoxin, guanethidine, indomethacin, mecamylamine, methyldopa, reserpine  ¨ Beta-blockers (such as atenolol, labetalol, metoprolol), medicine that contains acetaminophen, veratrum alkaloids  · Do not drink alcohol while you are using this medicine. Acetaminophen can damage your liver, and alcohol can increase this risk. If you regularly drink 3 or more alcoholic drinks every day, do not take any medicines that contain acetaminophen without asking your doctor. A drink of alcohol is 12 ounces of beer, 4 ounces of wine, or 1 ounce of liquor. · Tell your doctor if you use anything else that makes you sleepy. Some examples are allergy medicine, narcotic pain medicine, and alcohol. Warnings While Using This Medicine:   · Tell your doctor if you are pregnant or breastfeeding, or if you have high blood pressure, diabetes, heart disease, an eye problem such as glaucoma, a thyroid, liver, or kidney problem, or an enlarged prostate. · Tell your doctor if your symptoms get worse after 2 or 3 days of treatment, or if they do not improve after 7 days. · This medicine contains acetaminophen.  Read the labels of all other medicines you are using to see if they also contain acetaminophen, or ask your doctor or pharmacist. Bozena Pitts not use more than 4 grams (4,000 milligrams) total of acetaminophen in one day. · This medicine may make you dizzy or drowsy. Do not drive or use machines if you are not alert. · Tell any doctor or dentist who treats you that you are using this medicine. This medicine may affect certain medical test results. · Keep all medicine out of the reach of children. Never share your medicine with anyone. Possible Side Effects While Using This Medicine:   Call your doctor right away if you notice any of these side effects:  · Allergic reaction: Itching or hives, swelling in your face or hands, swelling or tingling in your mouth or throat, chest tightness, trouble breathing  · Confusion, depression, fear, or anxiety  · Fast, pounding, or uneven heartbeat  · Hearing or seeing things that are not really there  · Lightheadedness, dizziness, or fainting  · Painful urination  · Seizures or tremors  · Severe headache  · Trouble breathing  · Unusual bleeding, bruising, or weakness  If you notice these less serious side effects, talk with your doctor:   · Dry mouth or heavy sweating  · Nausea or vomiting  · Rash or pale skin  · Trouble sleeping  If you notice other side effects that you think are caused by this medicine, tell your doctor. Call your doctor for medical advice about side effects. You may report side effects to FDA at 0-622-FDA-7037  © 2017 [facility] Information is for End User's use only and may not be sold, redistributed or otherwise used for commercial purposes. The above information is an  only. It is not intended as medical advice for individual conditions or treatments. Talk to your doctor, nurse or pharmacist before following any medical regimen to see if it is safe and effective for you. Patient Education   Learning About Take-Home Naloxone Kits for Opioid Emergencies  What is naloxone? Naloxone is a medicine that reverses the effects of an opioid emergency.  Opioids are strong pain medicines. Examples include hydrocodone, oxycodone, fentanyl, and morphine. Heroin is also an opioid. Taking too much of an opioid can slow or stop your breathing. This is an emergency. If naloxone is given soon enough, it may save a life. Naloxone comes in a rescue kit you can carry with you. You may hear it called a Narcan kit. The rescue kit may contain:  · A nasal spray device that contains the medicine. · The medicine along with syringes and needles. Your doctor can give you a prescription for a rescue kit and show you how to use it. In some places you can buy kits without a prescription. Who should have a naloxone kit? Naloxone kits save lives. Keep one with you at all times if you or someone you know:  · Takes any opioids, whether prescribed to you or to someone else. This also includes heroin and methadone. · Takes opioids with alcohol or medicines that make you sleepy, like benzodiazepines (\"benzos,\" Xanax, Valium, or Klonopin). · Uses cocaine, meth or other illegal drugs. They could be mixed with unknown amounts of opioids. · Has a history of substance use. · Has had an opioid emergency before. If you accidentally take too much of an opioid, you may not be able to give yourself naloxone. Make sure that your family and friends know you have a kit. Tell them how and when to use it. When is naloxone used? Naloxone is used when a person shows signs of an opioid emergency. A person may have taken too much of an opioid if they have:  · Slow, shallow, or stopped breathing. · Blue or purple lips or fingertips. · No response when you ask questions, shake the person, or rub the person's breastbone with your knuckles. Make sure your family and friends know about these signs of an opioid emergency. If someone appears to have taken too much of an opioid, call 911. This is an emergency. How is it used?   If you take too much of an opioid, you may not be able to give yourself the medicine. So it's very important that your friends and family know how and when to give it to you. Rescue kits come with instructions. There are two ways to give the medicine:  · Nasal spray. ? This is the simplest method. ? The mist is sprayed into the nose of a person who is having an opioid emergency. The person should be lying down when the mist is sprayed. · Injection with needle and syringe. ? Follow the instructions very carefully. ? The medicine can be injected through clothing. Symptoms of an opioid emergency may return a few minutes after the first dose from the rescue kit. If that happens, a second dose is needed. Rescue kits may come with two doses for that reason. Keep your rescue kit with you always. You never know when someone might need it. If you think you or someone else may be having an opioid emergency but you're not sure, use the kit anyway. Aside from going through withdrawal, which may be uncomfortable, there is no danger in using this medicine. Go to the emergency room or call 911 right away. More treatment may be needed. How do you give a naloxone nasal spray? Look for signs of an opioid emergency. These include:  · Slow, shallow, or stopped breathing. · Blue or purple lips or fingertips. · No response when you ask questions, shake the person, or rub the person's breastbone with your knuckles. Call 911, or ask someone else to call. Start rescue breathing if needed. Give the medicine. 1. Put the person on their back and tilt the head back. Support the head and neck. 2. Put the nasal spray tip in one nostril, and push firmly on the plunger. Don't \"prime\" or squirt the medicine to see if it works. That just wastes a dose. 3. Continue rescue breathing if needed. 4. Wait 2 to 3 minutes, and repeat with another dose in the other nostril if needed. Reposition the person, and wait for help.     1. When the person is breathing again, lay the person on their left side with the right knee bent and left arm above the head. 2. Put the right hand under the head to support it. 3. Stay with the person until help arrives. Where can you learn more? Go to http://www.gray.com/  Enter Q404 in the search box to learn more about \"Learning About Take-Home Naloxone Kits for Opioid Emergencies. \"  Current as of: February 11, 2021               Content Version: 13.0  © 2006-2021 [employer]. Care instructions adapted under license by [employer] (which disclaims liability or warranty for this information). If you have questions about a medical condition or this instruction, always ask your healthcare professional. [employer] any warranty or liability for your use of this information.

## 2021-10-13 NOTE — PROGRESS NOTES
ACUTE OT GOALS:  (Developed with and agreed upon by patient and/or caregiver.)  1. Patient will complete lower body bathing and dressing with Mod I and adaptive equipment as needed. 2. Patient will complete toileting with Mod I and adaptive equipment as needed. 3. Patient will tolerate 23 minutes of OT treatment with 1-2 rest breaks to increase activity tolerance for ADLs. 4. Patient will complete functional transfers with Mod I and adaptive equipment as needed. Timeframe: 7 visits     OCCUPATIONAL THERAPY ASSESSMENT: Initial Assessment, Daily Note and AM OT Treatment Day # 1]    NWB CHIRAGJENNIFER Tamayo is a 77 y.o. male   PRIMARY DIAGNOSIS: Abscess of left foot  Abscess of left foot [L02.612]  Procedure(s) (LRB):  LEFT FOOT AMPUTATION ROOM B752 (Left)  AMPUTATION TOE (Left)  2 Days Post-Op  Reason for Referral:  Post-operative mobilization   ICD-10: Treatment Diagnosis: Generalized Muscle Weakness (M62.81)  Difficulty in walking, Not elsewhere classified (R26.2)  [place]PAT[place]: [place] / [place] / Product Type: Medicare /   ASSESSMENT:     REHAB RECOMMENDATIONS:   Recommendation to date pending progress:  [address] [place] [address] Therapy  Equipment:    None. Pt reports he has hospital bed, knee scooter, RW, and BSC. PRIOR LEVEL OF FUNCTION:  (Prior to Hospitalization)  INITIAL/CURRENT LEVEL OF FUNCTION:  (Based on today's evaluation)   Bathing:   Modified Independent  Dressing:   Modified Independent  Feeding/Grooming:   Independent  Toileting:   Modified Independent  Functional Mobility:   Independent Bathing:   Minimal Assistance for standing balance. Dressing:   Minimal Assistance for standing balance. Feeding/Grooming:   CGA to Min A for standing balance.   Toileting:   Minimal Assistance  Functional Mobility:   CGA to Min A with use of RW.     ASSESSMENT:  Mr. Enedina Villegas was admitted for abscess of L foot and is now s/p L midfoot amputation and in NWB in LLE. Pt is Mod I to complete bed mobility and requires Set Up to don R slipper in preparation for ambulation. Requires CGA x RW to complete initial sit <> stand. Pt presents with 100% adherence to weight bearing status. Requires CGA with intermittent Min A due to one small loss of balance to transfer from bed <> chair. Requires CGA x RW and CGA with intermittent Min A to walk from chair <> sink and from sink <> chair. Pt would benefit from continued skilled OT to increase independence and safety for performance of ADLs and functional mobility. SUBJECTIVE:   Mr. Trace Chavez states, \"My wife is a retired RN, she can help me with what I need. \"    SOCIAL HISTORY/LIVING ENVIRONMENT: Lives with spouse in one story home with 1 COURTNEY. Mod I for ADLs. Reports he was not ambulating 2 weeks prior to admission but reports he was Independent for ambulation prior to that.   Home Environment: Private residence  # Steps to Enter: 1  One/Two Story Residence: One story  Living Alone: No  Support Systems: Spouse/Significant Other    OBJECTIVE:     PAIN: VITAL SIGNS: LINES/DRAINS:   Pre Treatment: Pain Screen  Pain Scale 1: Numeric (0 - 10)  Pain Intensity 1: 0  Post Treatment: Unchanged   None  O2 Device: None (Room air)     GROSS EVALUATION:  BUE  Within Functional Limits Abnormal/ Functional Abnormal/ Non-Functional (see comments) Not Tested Comments:   AROM [x] [] [] []    PROM [] [] [] [x]    Strength [x] [] [] []    Balance [] [x] [] []    Posture [] [x] [] []    Sensation [x] [] [] []    Coordination [x] [] [] []    Tone [x] [] [] []    Edema [x] [] [] []    Activity Tolerance [] [x] [] []     [] [] [] []      COGNITION/  PERCEPTION: Intact Impaired   (see comments) Comments:   Orientation [x] []    Vision [x] []    Hearing [x] []    Judgment/ Insight [x] []    Attention [x] []    Memory [x] []    Command Following [x] []    Emotional Regulation [x] []     [] []      ACTIVITIES OF DAILY LIVING: I Mod I S SBA CGA Min Mod Max Total NT Comments   BASIC ADLs:              Bathing/ Showering [] [] [] [] [] [] [] [] [] [x]    Toileting [] [] [] [] [] [] [] [] [] [x]    Dressing [] [] [x] [] [] [] [] [] [] [] Set Up slipper management in seated. Feeding [] [] [] [] [] [] [] [] [] [x]    Grooming [] [] [] [] [] [] [] [] [] [x]    Personal Device Care [] [] [] [] [] [] [] [] [] [x]    Functional Mobility [] [] [] [] [x] [x] [] [] [] [] X RW   I=Independent, Mod I=Modified Independent, S=Supervision, SBA=Standby Assistance, CGA=Contact Guard Assistance,   Min=Minimal Assistance, Mod=Moderate Assistance, Max=Maximal Assistance, Total=Total Assistance, NT=Not Tested    MOBILITY: I Mod I S SBA CGA Min Mod Max Total  NT x2 Comments:   Supine to sit [] [x] [] [] [] [] [] [] [] [] []    Sit to supine [] [] [] [] [] [] [] [] [] [x] []    Sit to stand [] [] [] [] [x] [] [] [] [] [] []    Bed to chair [] [] [] [] [x] [x] [] [] [] [] [] Intermittent Min A due to small loss of balance. I=Independent, Mod I=Modified Independent, S=Supervision, SBA=Standby Assistance, CGA=Contact Guard Assistance,   Min=Minimal Assistance, Mod=Moderate Assistance, Max=Maximal Assistance, Total=Total Assistance, NT=Not Grund[place] 6   Daily Activity Inpatient Short Form        How much help from another person does the patient currently need. .. Total A Lot A Little None   1. Putting on and taking off regular lower body clothing? [] 1   [] 2   [x] 3   [] 4   2. Bathing (including washing, rinsing, drying)? [] 1   [] 2   [x] 3   [] 4   3. Toileting, which includes using toilet, bedpan or urinal?   [] 1   [] 2   [x] 3   [] 4   4. Putting on and taking off regular upper body clothing? [] 1   [] 2   [x] 3   [] 4   5. Taking care of personal grooming such as brushing teeth? [] 1   [] 2   [x] 3   [] 4   6. Eating meals? [] 1   [] 2   [] 3   [x] 4   © 2007, Trustees of [address] [place] [address] [address] [address], under license to [facility].  All rights reserved     Score:  Initial: 19, completed, 10/13/2021 Most Recent: X (Date: -- )   Interpretation of Tool:  Represents activities that are increasingly more difficult (i.e. Bed mobility, Transfers, Gait). PLAN:   FREQUENCY/DURATION: OT Plan of Care: 3 times/week for duration of hospital stay or until stated goals are met, whichever comes first.    PROBLEM LIST:   (Skilled intervention is medically necessary to address:)  1. Decreased ADL/Functional Activities  2. Decreased Activity Tolerance  3. Decreased Balance  4. Decreased Coordination  5. Decreased Gait Ability  6. Decreased Transfer Abilities   INTERVENTIONS PLANNED:   (Benefits and precautions of occupational therapy have been discussed with the patient.)  1. Self Care Training  2. Therapeutic Activity  3. Therapeutic Exercise/HEP  4. Neuromuscular Re-education  5. Education     TREATMENT:     EVALUATION: Low Complexity : (Untimed Charge)    TREATMENT:   Therapeutic Activity (10 Minutes): Therapeutic activity included Supine to Sit, Scooting, Transfer Training, Ambulation on level ground and Standing balance to improve functional Mobility, Strength and Activity tolerance. TREATMENT GRID:  N/A    AFTER TREATMENT POSITION/PRECAUTIONS:  Chair, Needs within reach, RN notified and table tray anteiror to pt.     INTERDISCIPLINARY COLLABORATION:  MD/PA/NP, RN/PCT and OT/GARCIA    TOTAL TREATMENT DURATION:  OT Patient Time In/Time Out  Time In: 0908  Time Out: 4436    DERREK Olivares/NAOMI

## 2021-10-13 NOTE — PROGRESS NOTES
Problem: Falls - Risk of  Goal: *Absence of Falls  Description: Document Ruben Lott Fall Risk and appropriate interventions in the flowsheet. 10/13/2021 1234 by Gonzalez Queen RN  Outcome: Resolved/Met  Note: Fall Risk Interventions:  Mobility Interventions: Bed/chair exit alarm, Patient to call before getting OOB, PT Consult for mobility concerns, OT consult for ADLs         Medication Interventions: Bed/chair exit alarm, Patient to call before getting OOB, Teach patient to arise slowly    Elimination Interventions: Bed/chair exit alarm, Call light in reach           10/13/2021 0806 by Gonzalez Queen RN  Outcome: Progressing Towards Goal  Note: Fall Risk Interventions:  Mobility Interventions: Bed/chair exit alarm, Patient to call before getting OOB, PT Consult for mobility concerns, OT consult for ADLs         Medication Interventions: Bed/chair exit alarm, Patient to call before getting OOB, Teach patient to arise slowly    Elimination Interventions: Bed/chair exit alarm, Call light in reach              Problem: Patient Education: Go to Patient Education Activity  Goal: Patient/Family Education  10/13/2021 1234 by Gonzalez Queen RN  Outcome: Resolved/Met  10/13/2021 0806 by Gonzalez Queen RN  Outcome: Progressing Towards Goal     Problem: Diabetes Self-Management  Goal: *Disease process and treatment process  Description: Define diabetes and identify own type of diabetes; list 3 options for treating diabetes. 10/13/2021 1234 by Gonzalez Queen RN  Outcome: Resolved/Met  10/13/2021 0806 by Gonzalez Queen RN  Outcome: Progressing Towards Goal  Goal: *Incorporating nutritional management into lifestyle  Description: Describe effect of type, amount and timing of food on blood glucose; list 3 methods for planning meals.   10/13/2021 1234 by Gonzalez Queen RN  Outcome: Resolved/Met  10/13/2021 0806 by Gonzalez Queen RN  Outcome: Progressing Towards Goal  Goal: *Incorporating physical activity into lifestyle  Description: State effect of exercise on blood glucose levels. 10/13/2021 1234 by Zhanna Elaine RN  Outcome: Resolved/Met  10/13/2021 0806 by Zhanna Elaine RN  Outcome: Progressing Towards Goal  Goal: *Developing strategies to promote health/change behavior  Description: Define the ABC's of diabetes; identify appropriate screenings, schedule and personal plan for screenings. 10/13/2021 1234 by Zhanna Elaine RN  Outcome: Resolved/Met  10/13/2021 0806 by Zhanna Elaine RN  Outcome: Progressing Towards Goal  Goal: *Using medications safely  Description: State effect of diabetes medications on diabetes; name diabetes medication taking, action and side effects. 10/13/2021 1234 by Zhanna Elaine RN  Outcome: Resolved/Met  10/13/2021 0806 by Zhanna Elaine RN  Outcome: Progressing Towards Goal  Goal: *Monitoring blood glucose, interpreting and using results  Description: Identify recommended blood glucose targets  and personal targets. 10/13/2021 1234 by Zhanna Elaine RN  Outcome: Resolved/Met  10/13/2021 0806 by Zhanna Elaine RN  Outcome: Progressing Towards Goal  Goal: *Prevention, detection, treatment of acute complications  Description: List symptoms of hyper- and hypoglycemia; describe how to treat low blood sugar and actions for lowering  high blood glucose level. 10/13/2021 1234 by Zhanna Elaine RN  Outcome: Resolved/Met  10/13/2021 0806 by Zhanna Elaine RN  Outcome: Progressing Towards Goal  Goal: *Prevention, detection and treatment of chronic complications  Description: Define the natural course of diabetes and describe the relationship of blood glucose levels to long term complications of diabetes.   10/13/2021 1234 by Zhanna Elaine RN  Outcome: Resolved/Met  10/13/2021 0806 by Zhanna Elaine RN  Outcome: Progressing Towards Goal  Goal: *Developing strategies to address psychosocial issues  Description: Describe feelings about living with diabetes; identify support needed and support network  10/13/2021 1234 by Gigi Juan RN  Outcome: Resolved/Met  10/13/2021 0806 by Gigi Juan RN  Outcome: Progressing Towards Goal  Goal: *Insulin pump training  10/13/2021 1234 by Gigi Juan RN  Outcome: Resolved/Met  10/13/2021 0806 by Gigi Juan RN  Outcome: Progressing Towards Goal  Goal: *Sick day guidelines  10/13/2021 1234 by Gigi Juan RN  Outcome: Resolved/Met  10/13/2021 0806 by Gigi Juan RN  Outcome: Progressing Towards Goal  Goal: *Patient Specific Goal (EDIT GOAL, INSERT TEXT)  10/13/2021 1234 by Gigi Juan RN  Outcome: Resolved/Met  10/13/2021 0806 by Gigi Juan RN  Outcome: Progressing Towards Goal     Problem: Patient Education: Go to Patient Education Activity  Goal: Patient/Family Education  10/13/2021 1234 by Gigi Juan RN  Outcome: Resolved/Met  10/13/2021 0806 by Gigi Juan RN  Outcome: Progressing Towards Goal     Problem: Patient Education: Go to Patient Education Activity  Goal: Patient/Family Education  10/13/2021 1234 by Gigi Juan RN  Outcome: Resolved/Met  10/13/2021 0806 by Gigi Juan RN  Outcome: Progressing Towards Goal     Problem: Patient Education: Go to Patient Education Activity  Goal: Patient/Family Education  Outcome: Resolved/Met

## 2021-10-13 NOTE — PROGRESS NOTES
Pt was medically cleared for dc to home today. No dc needs or concerns identified or reported. Care Management Interventions  PCP Verified by CM: Yes (Dr. Ines Pablo)  Mode of Transport at Discharge:  Other (see comment) (family )  Transition of Care Consult (CM Consult): Discharge Planning  Discharge Durable Medical Equipment: No  Physical Therapy Consult: No  Occupational Therapy Consult: No  Speech Therapy Consult: No  Support Systems: Spouse/Significant Other  Confirm Follow Up Transport: Family  The Plan for Transition of Care is Related to the Following Treatment Goals : Reurn to baseline   Freedom of Choice List was Provided with Basic Dialogue that Supports the Patient's Individualized Plan of Care/Goals, Treatment Preferences and Shares the Quality Data Associated with the Providers?: Yes  Discharge Location  Discharge Placement: Home

## 2021-10-13 NOTE — PROGRESS NOTES
Problem: Falls - Risk of  Goal: *Absence of Falls  Description: Document Dennis Cease Fall Risk and appropriate interventions in the flowsheet. Outcome: Progressing Towards Goal  Note: Fall Risk Interventions:  Mobility Interventions: Bed/chair exit alarm, Patient to call before getting OOB, PT Consult for mobility concerns, OT consult for ADLs         Medication Interventions: Bed/chair exit alarm, Patient to call before getting OOB, Teach patient to arise slowly    Elimination Interventions: Bed/chair exit alarm, Call light in reach              Problem: Patient Education: Go to Patient Education Activity  Goal: Patient/Family Education  Outcome: Progressing Towards Goal     Problem: Diabetes Self-Management  Goal: *Disease process and treatment process  Description: Define diabetes and identify own type of diabetes; list 3 options for treating diabetes. Outcome: Progressing Towards Goal  Goal: *Incorporating nutritional management into lifestyle  Description: Describe effect of type, amount and timing of food on blood glucose; list 3 methods for planning meals. Outcome: Progressing Towards Goal  Goal: *Incorporating physical activity into lifestyle  Description: State effect of exercise on blood glucose levels. Outcome: Progressing Towards Goal  Goal: *Developing strategies to promote health/change behavior  Description: Define the ABC's of diabetes; identify appropriate screenings, schedule and personal plan for screenings. Outcome: Progressing Towards Goal  Goal: *Using medications safely  Description: State effect of diabetes medications on diabetes; name diabetes medication taking, action and side effects. Outcome: Progressing Towards Goal  Goal: *Monitoring blood glucose, interpreting and using results  Description: Identify recommended blood glucose targets  and personal targets.   Outcome: Progressing Towards Goal  Goal: *Prevention, detection, treatment of acute complications  Description: List symptoms of hyper- and hypoglycemia; describe how to treat low blood sugar and actions for lowering  high blood glucose level. Outcome: Progressing Towards Goal  Goal: *Prevention, detection and treatment of chronic complications  Description: Define the natural course of diabetes and describe the relationship of blood glucose levels to long term complications of diabetes.   Outcome: Progressing Towards Goal  Goal: *Developing strategies to address psychosocial issues  Description: Describe feelings about living with diabetes; identify support needed and support network  Outcome: Progressing Towards Goal  Goal: *Insulin pump training  Outcome: Progressing Towards Goal  Goal: *Sick day guidelines  Outcome: Progressing Towards Goal  Goal: *Patient Specific Goal (EDIT GOAL, INSERT TEXT)  Outcome: Progressing Towards Goal     Problem: Patient Education: Go to Patient Education Activity  Goal: Patient/Family Education  Outcome: Progressing Towards Goal     Problem: Patient Education: Go to Patient Education Activity  Goal: Patient/Family Education  Outcome: Progressing Towards Goal

## 2021-10-13 NOTE — PROGRESS NOTES
Patient discharged home with wife at this time. A&Ox4. No complaints of acute pain or discomfort. IV's taken out and no tele on. Discharge instructions gone over with patient and when I told patient prescriptions were sent to his pharmacy at [facility] he was very frustrated stating that he told the Doctor his [facility] [address] [facility]. I will page Dr. Corie Hyatt at this time and get him to send all prescriptions to [facility]. Safety precautions maintained. Dressing to left foot, c/d/i with no drainage and pt will follow up with MD's. Discharged home with his wife at this time.

## 2021-10-15 LAB
BACTERIA SPEC CULT: NORMAL
SERVICE CMNT-IMP: NORMAL

## 2021-10-15 NOTE — ANESTHESIA POSTPROCEDURE EVALUATION
Procedure(s):  LEFT FOOT AMPUTATION ROOM R250  AMPUTATION TOE.    total IV anesthesia    Anesthesia Post Evaluation      Multimodal analgesia: multimodal analgesia used between 6 hours prior to anesthesia start to PACU discharge  Patient location during evaluation: PACU  Patient participation: complete - patient participated  Level of consciousness: awake  Pain management: adequate  Airway patency: patent  Anesthetic complications: no  Cardiovascular status: acceptable  Respiratory status: acceptable  Hydration status: acceptable  Post anesthesia nausea and vomiting:  none  Final Post Anesthesia Temperature Assessment:  Normothermia (36.0-37.5 degrees C)      INITIAL Post-op Vital signs:   Vitals Value Taken Time   /83 10/11/21 1305   Temp 36.4 °C (97.5 °F) 10/11/21 1305   Pulse 69 10/11/21 1305   Resp 18 10/11/21 1305   SpO2 98 % 10/11/21 1305

## 2022-03-18 PROBLEM — E11.9 DM TYPE 2 (DIABETES MELLITUS, TYPE 2) (HCC): Status: ACTIVE | Noted: 2021-10-05

## 2022-03-18 PROBLEM — N17.9 AKI (ACUTE KIDNEY INJURY) (HCC): Status: ACTIVE | Noted: 2021-10-11

## 2022-03-18 PROBLEM — I25.10 CAD (CORONARY ARTERY DISEASE): Status: ACTIVE | Noted: 2021-10-05

## 2022-03-18 PROBLEM — M48.062 LUMBAR STENOSIS WITH NEUROGENIC CLAUDICATION: Status: ACTIVE | Noted: 2019-03-27

## 2022-03-18 PROBLEM — E78.5 HYPERLIPIDEMIA: Status: ACTIVE | Noted: 2021-10-05

## 2022-03-18 PROBLEM — I10 HTN (HYPERTENSION): Status: ACTIVE | Noted: 2021-10-05

## 2022-03-18 PROBLEM — E66.9 CLASS 1 OBESITY IN ADULT: Status: ACTIVE | Noted: 2021-10-05

## 2022-03-18 PROBLEM — E11.40 DIABETIC NEUROPATHY ASSOCIATED WITH TYPE 2 DIABETES MELLITUS (HCC): Status: ACTIVE | Noted: 2021-10-05

## 2022-03-19 PROBLEM — G47.30 SLEEP APNEA: Status: ACTIVE | Noted: 2021-10-05

## 2022-03-19 PROBLEM — L02.612 ABSCESS OF LEFT FOOT: Status: ACTIVE | Noted: 2021-10-05

## 2022-03-20 PROBLEM — Z95.0 H/O CARDIAC PACEMAKER: Status: ACTIVE | Noted: 2021-10-05

## 2022-03-20 PROBLEM — M86.9 OSTEOMYELITIS OF LEFT FOOT (HCC): Status: ACTIVE | Noted: 2021-10-13

## 2022-03-20 PROBLEM — J44.9 COPD (CHRONIC OBSTRUCTIVE PULMONARY DISEASE) (HCC): Status: ACTIVE | Noted: 2021-10-05

## 2022-03-20 PROBLEM — J44.1 COPD WITH ACUTE EXACERBATION (HCC): Status: ACTIVE | Noted: 2021-10-11

## 2022-03-20 PROBLEM — Z89.432 STATUS POST AMPUTATION OF LEFT FOOT THROUGH METATARSAL BONE (HCC): Status: ACTIVE | Noted: 2021-10-13

## 2022-03-20 PROBLEM — M35.3 POLYMYALGIA RHEUMATICA (HCC): Status: ACTIVE | Noted: 2021-10-05

## 2022-09-13 ENCOUNTER — OFFICE VISIT (OUTPATIENT)
Dept: ORTHOPEDIC SURGERY | Age: 77
End: 2022-09-13
Payer: MEDICARE

## 2022-09-13 DIAGNOSIS — M47.816 SPONDYLOSIS OF LUMBAR REGION WITHOUT MYELOPATHY OR RADICULOPATHY: Primary | ICD-10-CM

## 2022-09-13 DIAGNOSIS — M54.50 LUMBAR BACK PAIN: ICD-10-CM

## 2022-09-13 DIAGNOSIS — M54.16 LUMBAR RADICULOPATHY: ICD-10-CM

## 2022-09-13 PROCEDURE — G8417 CALC BMI ABV UP PARAM F/U: HCPCS | Performed by: PHYSICAL MEDICINE & REHABILITATION

## 2022-09-13 PROCEDURE — 4004F PT TOBACCO SCREEN RCVD TLK: CPT | Performed by: PHYSICAL MEDICINE & REHABILITATION

## 2022-09-13 PROCEDURE — 3017F COLORECTAL CA SCREEN DOC REV: CPT | Performed by: PHYSICAL MEDICINE & REHABILITATION

## 2022-09-13 PROCEDURE — G8428 CUR MEDS NOT DOCUMENT: HCPCS | Performed by: PHYSICAL MEDICINE & REHABILITATION

## 2022-09-13 PROCEDURE — 99213 OFFICE O/P EST LOW 20 MIN: CPT | Performed by: PHYSICAL MEDICINE & REHABILITATION

## 2022-09-13 PROCEDURE — 1123F ACP DISCUSS/DSCN MKR DOCD: CPT | Performed by: PHYSICAL MEDICINE & REHABILITATION

## 2022-09-13 NOTE — PROGRESS NOTES
Date:  09/13/22     HPI:  I am seeing Leopold Grant in evalution/folowup. I last saw him in March. He has had 3 prior or surgical spine procedures, full details in a previous note by me. The most recent procedure was an L2-L3 lumbar laminectomy by Dr. Sandra Hernandez several years ago. At that time he had pain gravitating down the right leg. As of late he has had pain in the lower lumbar paraspinal areas. It may gravitate to the buttocks. He also has had some weakness in the legs. A CT myelogram of the lumbar spine from last summer revealed postoperative changes but nothing suggesting neural impingement or suggestion of further spine surgical intervention. He underwent 2 level bilateral lower lumbar facet injections in February for which he did not have much of a benefit, though he did feel that some of the buttock and lower extremity symptoms were better. The plan was to repeat injections but at 3 levels to hopefully get a better response. I did not clearly want to perform a translaminar lumbar MARCIANO because of his multiple surgical procedures and the fact he is on anticoagulant therapy. In the interim he has had numerous issues to include either a worsening of or a new diagnosis of polymyalgia rheumatica. He also has had a right foot procedure with a wound healing issue for which he is undergoing treatment. He presents today in a wheelchair. He also has had visual changes in one of his eyes for which she is scheduled undergo a temporal artery biopsy in 2 days and he is having his anticoagulant therapy held prior to that intervention. ROS: As above    PE: Alert and cooperative. Mild weakness in lower extremities, cannot examine his right foot due to wearing a boot and wound healing issues. No pathologically heightened reflexes. I did not examine his gait. He has tenderness in the lower lumbar paraspinal areas much more so than buttocks.     Assessment/Plan:  PREDOMINANTLY MUSCULOSKELETAL LUMBOSACRAL  SPINE PAIN. I still suspect facet joint arthropathy as the most noted offender at this time. As per my last note, I would entertain 3 level bilateral lower lumbar facet injections to see if we cannot get a better response. In my medical opinion, I believe that 3 level facet injections bilaterally is medically necessary to offer a better response and to clarify if intraarticular injections will be beneficial or not from a general standpoint. If no benefit from that, could reevaluate for other spine injections or consider referral to a comprehensive pain management provider to look in the possibility of an RFA procedure. From most recent spine imaging, I do not think there is a repeat spine surgical issue. Not a lot I can add or offer from a medicinal standpoint. I will have him come in later this week for these injections, the day after his temporal artery biopsy.     Nilson Briseno MD

## 2022-09-16 ENCOUNTER — OFFICE VISIT (OUTPATIENT)
Dept: ORTHOPEDIC SURGERY | Age: 77
End: 2022-09-16
Payer: MEDICARE

## 2022-09-16 DIAGNOSIS — M47.816 SPONDYLOSIS OF LUMBAR REGION WITHOUT MYELOPATHY OR RADICULOPATHY: Primary | ICD-10-CM

## 2022-09-16 PROCEDURE — 64494 INJ PARAVERT F JNT L/S 2 LEV: CPT | Performed by: PHYSICAL MEDICINE & REHABILITATION

## 2022-09-16 PROCEDURE — 64495 INJ PARAVERT F JNT L/S 3 LEV: CPT | Performed by: PHYSICAL MEDICINE & REHABILITATION

## 2022-09-16 PROCEDURE — 64493 INJ PARAVERT F JNT L/S 1 LEV: CPT | Performed by: PHYSICAL MEDICINE & REHABILITATION

## 2022-09-16 RX ORDER — TRIAMCINOLONE ACETONIDE 40 MG/ML
300 INJECTION, SUSPENSION INTRA-ARTICULAR; INTRAMUSCULAR ONCE
Status: COMPLETED | OUTPATIENT
Start: 2022-09-16 | End: 2022-09-16

## 2022-09-16 RX ADMIN — TRIAMCINOLONE ACETONIDE 300 MG: 40 INJECTION, SUSPENSION INTRA-ARTICULAR; INTRAMUSCULAR at 09:11

## 2022-09-16 NOTE — PROGRESS NOTES
Date: 09/16/22   Name: Gatito Ledezma    Pre-Procedural Diagnosis:    Diagnosis Orders   1. Spondylosis of lumbar region without myelopathy or radiculopathy  FL INJ LUMB/SAC FACET SINGLE LEVEL    XR INJ FACET LUMB SACRAL 2ND LVL    XR INJ FACET LUMB SACRAL 3RD LVL    triamcinolone acetonide (KENALOG-40) injection 300 mg          Procedure: Bilateral Facet Joint injections -  Multiple Level     Precautions: [facility] Precautions spine injections: None. Patient denies any prior sensitivity to steroid, local anesthetic, contrast dye, iodine or shellfish. The procedure was discussed at length with the patient and informed consent was signed. The patient was placed in a prone position on the fluoroscopy table and the skin was prepped and draped in a routine sterile fashion. The areas to be injected were each anesthetized with approximately 2-3 cc of 1% Lidocaine. Initially a 22-gauge 3.5 inch inch spinal needle was carefully advanced under fluoroscopic guidance to the bilateral L3-L4, bilateral L4-L5 and bilateral L5-S1 facet joint spaces. 1 cc of 0.25% Marcaine and 50 mg of Kenalog were injected through the spinal needle at each site. Fluoroscopic guidance was used intermittently over a 10-minute period to insure proper needle placement and patient safety. A hard copy of the fluoroscopic  images has been placed in the patient's chart. The patient was monitored after the procedure and discharged home without complication.      Resume Meds:  N/A Remains on Bethel Tenorio MD  09/16/22

## (undated) DEVICE — INTENDED FOR TISSUE SEPARATION, AND OTHER PROCEDURES THAT REQUIRE A SHARP SURGICAL BLADE TO PUNCTURE OR CUT.: Brand: BARD-PARKER SAFETY BLADES SIZE 15, STERILE

## (undated) DEVICE — KENDALL SCD EXPRESS SLEEVES, KNEE LENGTH, MEDIUM: Brand: KENDALL SCD

## (undated) DEVICE — SUT ETHLN 3-0 18IN PS1 BLK --

## (undated) DEVICE — SUTURE MCRYL SZ 2-0 L27IN ABSRB UD CP-1 1 L36MM 1/2 CIR REV Y266H

## (undated) DEVICE — PRECISION THIN (16.5 X 0.38 X 34.5MM)

## (undated) DEVICE — 1010 S-DRAPE TOWEL DRAPE 10/BX: Brand: STERI-DRAPE™

## (undated) DEVICE — BANDAGE COMPR SELF ADH 5 YDX4 IN TAN STRL PREMIERPRO LF

## (undated) DEVICE — PAD,ABDOMINAL,5"X9",ST,LF,25/BX: Brand: MEDLINE INDUSTRIES, INC.

## (undated) DEVICE — PADDING CAST W2INXL4YD NONSTERILE COT COHESIVE HND TEARABLE

## (undated) DEVICE — PACK PROCEDURE SURG POST LAMINECTOMY CDS

## (undated) DEVICE — YANKAUER,BULB TIP,W/O VENT,RIGID,STERILE: Brand: MEDLINE

## (undated) DEVICE — 4.0MM ROUND FAST CUTTING BUR

## (undated) DEVICE — FLOSEAL HEMOSTATIC MATRIX, 5 ML: Brand: FLOSEAL

## (undated) DEVICE — SWAB CULT DBL W/O CHAR RAYON TIP AMIES GEL CLMN FOR COLL

## (undated) DEVICE — DRAPE SHT 3 QTR PROXIMA 53X77 --

## (undated) DEVICE — FOOT & ANKLE SOFT DR WOMACK: Brand: MEDLINE INDUSTRIES, INC.

## (undated) DEVICE — Device

## (undated) DEVICE — KIT POS W/ FOAM ARM CRADL SHEARGUARD CHST PD CVR FOR SPNL

## (undated) DEVICE — 3000CC GUARDIAN II: Brand: GUARDIAN

## (undated) DEVICE — DRSG GZ OIL EMUL CURAD 3X8 --

## (undated) DEVICE — REM POLYHESIVE ADULT PATIENT RETURN ELECTRODE: Brand: VALLEYLAB

## (undated) DEVICE — 2000CC GUARDIAN II: Brand: GUARDIAN

## (undated) DEVICE — BNDG,ELSTC,MATRIX,STRL,4"X5YD,LF,HOOK&LP: Brand: MEDLINE

## (undated) DEVICE — TRAY PREP DRY W/ PREM GLV 2 APPL 6 SPNG 2 UNDPD 1 OVERWRAP

## (undated) DEVICE — MEDI-VAC YANKAUER SUCTION HANDLE W/BULBOUS TIP: Brand: CARDINAL HEALTH

## (undated) DEVICE — MASTISOL ADHESIVE LIQ 2/3ML

## (undated) DEVICE — DRAPE,TOP,102X53,STERILE: Brand: MEDLINE

## (undated) DEVICE — SOLUTION IRRIG 1000ML 09% SOD CHL USP PIC PLAS CONTAINER

## (undated) DEVICE — SYR 10ML CTRL LR LCK NSAF LF --

## (undated) DEVICE — DRAPE XR C ARM 41X74IN LF --

## (undated) DEVICE — CARDINAL HEALTH FLEXIBLE LIGHT HANDLE COVER: Brand: CARDINAL HEALTH

## (undated) DEVICE — SUTURE VCRL SZ 1 L27IN ABSRB UD L36MM CP-1 1/2 CIR REV CUT J268H

## (undated) DEVICE — BANDAGE,GAUZE,CONFORMING,2"X75",STRL,LF: Brand: MEDLINE INDUSTRIES, INC.

## (undated) DEVICE — INTENDED FOR TISSUE SEPARATION, AND OTHER PROCEDURES THAT REQUIRE A SHARP SURGICAL BLADE TO PUNCTURE OR CUT.: Brand: BARD-PARKER SAFETY BLADES SIZE 10, STERILE

## (undated) DEVICE — SUTURE VCRL SZ 2-0 L27IN ABSRB UD L36MM CP-1 1/2 CIR REV J266H

## (undated) DEVICE — SKIN PREP TRAY 4 COMPARTM TRAY: Brand: MEDLINE INDUSTRIES, INC.

## (undated) DEVICE — INTENDED FOR TISSUE SEPARATION, AND OTHER PROCEDURES THAT REQUIRE A SHARP SURGICAL BLADE TO PUNCTURE OR CUT.: Brand: BARD-PARKER ® STAINLESS STEEL BLADES

## (undated) DEVICE — BASIC SINGLE BASIN-LF: Brand: MEDLINE INDUSTRIES, INC.

## (undated) DEVICE — SYR 10ML LUER LOK 1/5ML GRAD --

## (undated) DEVICE — (D)STRIP SKN CLSR 0.5X4IN WHT --

## (undated) DEVICE — PRECISION THIN (9.0 X 0.38 X 31.0MM)

## (undated) DEVICE — BUTTON SWITCH PENCIL BLADE ELECTRODE, HOLSTER: Brand: EDGE

## (undated) DEVICE — PREP SKN CHLRAPRP APL 26ML STR --

## (undated) DEVICE — 3M™ IOBAN™ 2 ANTIMICROBIAL INCISE DRAPE 6650EZ: Brand: IOBAN™ 2

## (undated) DEVICE — (D)PREP SKN CHLRAPRP APPL 26ML -- CONVERT TO ITEM 371833

## (undated) DEVICE — ZIMMER® STERILE DISPOSABLE TOURNIQUET CUFF WITH PLC, DUAL PORT, SINGLE BLADDER, 18 IN. (46 CM)

## (undated) DEVICE — NEEDLE HYPO 25GA L5/8IN ORNG HUB S STL LATCH BVL UP

## (undated) DEVICE — SUTURE ETHBND EXCEL SZ 2 L27IN NONABSORBABLE GRN WHT MO-7 D7485

## (undated) DEVICE — BASIC SINGLE BASIN BTC-LF: Brand: MEDLINE INDUSTRIES, INC.

## (undated) DEVICE — WAX SURG 2.5GM HEMSTAT BNE BEESWAX PARAFFIN ISO PALMITATE

## (undated) DEVICE — APPLICATOR BNDG 1MM ADH PREMIERPRO EXOFIN

## (undated) DEVICE — SUT PROL 2-0 30IN CT2 BLU --

## (undated) DEVICE — SLING ARM AD ULT

## (undated) DEVICE — SUTURE VCRL SZ 0 L27IN ABSRB UD L36MM CP-1 1/2 CIR REV CUT J267H

## (undated) DEVICE — CANISTER WND VAC ASST CLSR --

## (undated) DEVICE — SUTURE VCRL + 3-0 L27IN ABSRB UD PS-2 L19MM 3/8 CIR PRIM VCP427H

## (undated) DEVICE — SUTURE MCRYL SZ 3-0 L27IN ABSRB UD L24MM PS-1 3/8 CIR PRIM Y936H

## (undated) DEVICE — STOCKINETTE TUBE 9X48 -- MEDICHOICE

## (undated) DEVICE — SUTURE MCRYL SZ 3-0 L27IN ABSRB UD L19MM PS-2 3/8 CIR PRIM Y427H

## (undated) DEVICE — DRSG VAC ASST CLSR GRNUFM MED -- 18X12.5X3.2CM

## (undated) DEVICE — CYSTO/BLADDER IRRIGATION SET, REGULATING CLAMP

## (undated) DEVICE — SOLUTION IV 1000ML 0.9% SOD CHL

## (undated) DEVICE — SOLUTION IRRIG 3000ML 0.9% SOD CHL FLX CONT 0797208] ICU MEDICAL INC]

## (undated) DEVICE — 5.0MM PRECISION ROUND

## (undated) DEVICE — AMD ANTIMICROBIAL GAUZE SPONGES,12 PLY USP TYPE VII, 0.2% POLYHEXAMETHYLENE BIGUANIDE HCI (PHMB): Brand: CURITY